# Patient Record
Sex: FEMALE | Employment: UNEMPLOYED | URBAN - METROPOLITAN AREA
[De-identification: names, ages, dates, MRNs, and addresses within clinical notes are randomized per-mention and may not be internally consistent; named-entity substitution may affect disease eponyms.]

---

## 2017-01-06 ENCOUNTER — OFFICE VISIT (OUTPATIENT)
Dept: URGENT CARE | Facility: URGENT CARE | Age: 52
End: 2017-01-06
Payer: COMMERCIAL

## 2017-01-06 VITALS
OXYGEN SATURATION: 100 % | DIASTOLIC BLOOD PRESSURE: 71 MMHG | HEART RATE: 88 BPM | BODY MASS INDEX: 30.26 KG/M2 | TEMPERATURE: 97.8 F | WEIGHT: 187.4 LBS | SYSTOLIC BLOOD PRESSURE: 125 MMHG

## 2017-01-06 DIAGNOSIS — J01.00 ACUTE MAXILLARY SINUSITIS, RECURRENCE NOT SPECIFIED: Primary | ICD-10-CM

## 2017-01-06 PROCEDURE — 99213 OFFICE O/P EST LOW 20 MIN: CPT | Performed by: INTERNAL MEDICINE

## 2017-01-06 NOTE — MR AVS SNAPSHOT
"              After Visit Summary   2017    Saritha Pearson    MRN: [id number]           Patient Information     Date Of Birth          1965        Visit Information        Provider Department      2017 8:50 PM Ernestina Contreras MD [facility]        Today's Diagnoses     Acute maxillary sinusitis, recurrence not specified    -  1        Follow-ups after your visit        Follow-up notes from your care team     Return if symptoms worsen or fail to improve.      Who to contact     If you have questions or need follow up information about today's clinic visit or your schedule please contact [facility] directly at [phone].  Normal or non-critical lab and imaging results will be communicated to you by MyChart, letter or phone within 4 business days after the clinic has received the results. If you do not hear from us within 7 days, please contact the clinic through MyChart or phone. If you have a critical or abnormal lab result, we will notify you by phone as soon as possible.  Submit refill requests through [employer] or call your pharmacy and they will forward the refill request to us. Please allow 3 business days for your refill to be completed.          Additional Information About Your Visit        MyChart Information     [employer] lets you send messages to your doctor, view your test results, renew your prescriptions, schedule appointments and more. To sign up, go to [email or web address] . Click on \"Log in\" on the left side of the screen, which will take you to the Welcome page. Then click on \"Sign up Now\" on the right side of the page.     You will be asked to enter the access code listed below, as well as some personal information. Please follow the directions to create your username and password.     Your access code is: [email or web address]  Expires: 3/8/2017  7:51 PM     Your access code will  in 90 days. If you need help or a new code, please call your [facility] " or [phone].        Care EveryWhere ID     This is your Care EveryWhere ID. This could be used by other organizations to access your [place] medical records  [id number]        Your Vitals Were     Pulse Temperature Pulse Oximetry             88 97.8  F (36.6  C) (Oral) 100%          Blood Pressure from Last 3 Encounters:   01/06/17 125/71   12/08/16 132/84   08/01/16 130/85    Weight from Last 3 Encounters:   01/06/17 187 lb 6.4 oz (85.004 kg)   12/08/16 192 lb 6.4 oz (87.272 kg)   08/01/16 170 lb (77.111 kg)              Today, you had the following     No orders found for display         Today's Medication Changes          These changes are accurate as of: 1/6/17  9:30 PM.  If you have any questions, ask your nurse or doctor.               Start taking these medicines.        Dose/Directions    amoxicillin-clavulanate 875-125 MG per tablet   Commonly known as:  AUGMENTIN   Used for:  Acute maxillary sinusitis, recurrence not specified   Started by:  Ernestina Contreras MD        Dose:  1 tablet   Take 1 tablet by mouth 2 times daily   Quantity:  20 tablet   Refills:  0            Where to get your medicines      These medications were sent to [facility] [address] [place] [id number]    Hours:  24-hours Phone:  [phone]    - amoxicillin-clavulanate 875-125 MG per tablet             Primary Care Provider Office Phone # Fax #    [facility] [phone] [address] [facility] [address]        Thank you!     Thank you for choosing [facility]  for your care. Our goal is always to provide you with excellent care. Hearing back from our patients is one way we can continue to improve our services. Please take a few minutes to complete the written survey that you may receive in the mail after your visit with us. Thank you!             Your Updated Medication  List - Protect others around you: Learn how to safely use, store and throw away your medicines at www.disposemymeds.org.          This list is accurate as of: 1/6/17  9:30 PM.  Always use your most recent med list.                   Brand Name Dispense Instructions for use    albuterol 108 (90 BASE) MCG/ACT Inhaler    PROAIR HFA/PROVENTIL HFA/VENTOLIN HFA    1 Inhaler    Inhale 2 puffs into the lungs every 4 hours as needed for shortness of breath / dyspnea or wheezing       amoxicillin-clavulanate 875-125 MG per tablet    AUGMENTIN    20 tablet    Take 1 tablet by mouth 2 times daily       bisacodyl 10 MG Suppository    DULCOLAX    25 suppository    Place 1 suppository (10 mg) rectally daily as needed for constipation       hydrocortisone 25 MG Suppository    ANUSOL-HC    28 suppository    Place 1 suppository (25 mg) rectally 2 times daily       MIRALAX powder   Generic drug:  polyethylene glycol     510 g    Take 17 g by mouth daily       phentermine 37.5 MG capsule     30 capsule    Take 1 capsule (37.5 mg) by mouth every morning

## 2017-01-07 NOTE — PROGRESS NOTES
SUBJECTIVE:  Saritha Pearson is an 51 year old female who presents for not feeling well.  Feels like left side of her face hurts and is congestion, like a sinus infection.  Had a sinus infection back in May which went away with augmentin.  Has felt warm but not checked temp.  Has taken ibuprofen and an OTC cold medicine which haven't helped.  sxs started about 2.5 weeks ago, and hasn't improved and getting a little worse. No recent travel.  No known exposures, but around people in general.  Some cough and runny nose.  No n/v/d.  No new skin rashes.  Some ear discomfort.        has a past medical history of PONV (postoperative nausea and vomiting); Gastro-oesophageal reflux disease; Asthma; and Constipation.  ALLERGIES:  Sulfa drugs    Current Outpatient Prescriptions   Medication     albuterol (PROAIR HFA, PROVENTIL HFA, VENTOLIN HFA) 108 (90 BASE) MCG/ACT inhaler     phentermine 37.5 MG capsule     polyethylene glycol (MIRALAX) powder     bisacodyl (DULCOLAX) 10 MG suppository     hydrocortisone (ANUSOL-HC) 25 MG suppository     No current facility-administered medications for this visit.         ROS:  ROS is done and is negative for general/constitutional, eye, ENT, Respiratory, cardiovascular, GI, , Skin, musculoskeletal except as noted elsewhere.      OBJECTIVE:  /71 mmHg  Pulse 88  Temp(Src) 97.8  F (36.6  C) (Oral)  Wt 187 lb 6.4 oz (85.004 kg)  SpO2 100%  GENERAL APPEARANCE: Alert, in no acute distress  EYES: normal  EARS: External ears normal. Canals clear. TM's normal.  NOSE: mildly inflamed mucosa  OROPHARYNX:normal  SINUSES: tender over maxillary sinuses, left worse than right  NECK:No adenopathy,masses or thyromegaly  RESP: normal and clear to auscultation  CV:regular rate and rhythm and no murmurs, clicks, or gallops  ABDOMEN: Abdomen soft, non-tender. BS normal. No masses, organomegaly  SKIN: dry appearing skin around eyes  MUSCULOSKELETAL:Musculoskeletal normal      RECENT LAB  RESULTS  Results for orders placed or performed during the hospital encounter of 08/01/16   Leg, right, venous US    Narrative    RIGHT LOWER EXTREMITY VENOUS DUPLEX ULTRASOUND  8/1/2016  8:06 PM     HISTORY:  Pain and swelling after recent surgery.    COMPARISON: None.    FINDINGS: Venous Doppler of the right lower extremity with waveform  spectral analysis. Exam is normal. No evidence for deep venous  thrombosis.      Impression    IMPRESSION: No evidence for deep venous thrombosis in the right lower  extremity.    ZAIDA LANGE MD   US Upper Extremity Venous Duplex Left    Narrative    LEFT UPPER EXTREMITY VENOUS DUPLEX ULTRASOUND  8/1/2016 8:06 PM     HISTORY: Pain. Recent IV. Rule out DVT.    TECHNIQUE: Venous Doppler US of the upper extremity.? Color flow and  spectral Doppler with waveform analysis performed.    COMPARISON: None.    FINDINGS: Ultrasound of the left upper extremity demonstrates no deep  vein thrombus in the subclavian, axillary or brachial veins. No  thrombus seen in the cephalic or basilic veins or visualized portions  of internal jugular vein.          Impression    IMPRESSION: No DVT identified left upper extremity.        GREGORY ALMARAZ MD   .    ASSESSMENT/PLAN:    ASSESSMENT / PLAN:  (J01.00) Acute maxillary sinusitis, recurrence not specified  (primary encounter diagnosis)  Comment: sxs for over 2 weeks and worsening, so will tx.  augmentin has worked well for her in past, so will rx now.  Plan: amoxicillin-clavulanate (AUGMENTIN) 875-125 MG         per tablet        Reviewed medication instructions and side effects. Follow up if experiences side effects.. I reviewed supportive care, expected course, and signs of concern.  Follow up prn or if she does not improve or if worsens in any way.      See [facility] for orders, medications, letters, patient instructions    Ernestina Contreras M.D.

## 2017-01-07 NOTE — NURSING NOTE
"Chief Complaint   Patient presents with     Sinus Problem     congestion and sinus pressure and ear pain x 2.5 weeks        Initial /71 mmHg  Pulse 88  Temp(Src) 97.8  F (36.6  C) (Oral)  Wt 187 lb 6.4 oz (85.004 kg)  SpO2 100% Estimated body mass index is 30.26 kg/(m^2) as calculated from the following:    Height as of 8/1/16: 5' 6\" (1.676 m).    Weight as of this encounter: 187 lb 6.4 oz (85.004 kg).  BP completed using cuff size: jori CAIN CMA ([facility])  9:01 PM 1/6/2017      "

## 2017-03-03 ENCOUNTER — OFFICE VISIT (OUTPATIENT)
Dept: FAMILY MEDICINE | Facility: CLINIC | Age: 52
End: 2017-03-03
Payer: COMMERCIAL

## 2017-03-03 VITALS
HEART RATE: 75 BPM | TEMPERATURE: 97.7 F | OXYGEN SATURATION: 95 % | WEIGHT: 188 LBS | BODY MASS INDEX: 30.22 KG/M2 | HEIGHT: 66 IN | SYSTOLIC BLOOD PRESSURE: 130 MMHG | DIASTOLIC BLOOD PRESSURE: 78 MMHG

## 2017-03-03 DIAGNOSIS — Z01.818 PREOP GENERAL PHYSICAL EXAM: Primary | ICD-10-CM

## 2017-03-03 PROCEDURE — 99214 OFFICE O/P EST MOD 30 MIN: CPT | Performed by: FAMILY MEDICINE

## 2017-03-03 NOTE — MR AVS SNAPSHOT
After Visit Summary   3/3/2017    Saritha Pearson    MRN: [id number]           Patient Information     Date Of Birth          1965        Visit Information        Provider Department      3/3/2017 9:30 AM Lalo Gold MD [facility]        Today's Diagnoses     Preop general physical exam    -  1      Care Instructions      Before Your Surgery      Call your surgeon if there is any change in your health. This includes signs of a cold or flu (such as a sore throat, runny nose, cough, rash or fever).    Do not smoke, drink alcohol or take over the counter medicine (unless your surgeon or primary care doctor tells you to) for the 24 hours before and after surgery.    If you take prescribed drugs: Follow your doctor s orders about which medicines to take and which to stop until after surgery.    Eating and drinking prior to surgery: follow the instructions from your surgeon    Take a shower or bath the night before surgery. Use the soap your surgeon gave you to gently clean your skin. If you do not have soap from your surgeon, use your regular soap. Do not shave or scrub the surgery site.  Wear clean pajamas and have clean sheets on your bed.         Follow-ups after your visit        Who to contact     If you have questions or need follow up information about today's clinic visit or your schedule please contact [facility] directly at [phone].  Normal or non-critical lab and imaging results will be communicated to you by MyChart, letter or phone within 4 business days after the clinic has received the results. If you do not hear from us within 7 days, please contact the clinic through MyChart or phone. If you have a critical or abnormal lab result, we will notify you by phone as soon as possible.  Submit refill requests through [employer] or call your pharmacy and they will forward the refill request to us. Please allow 3 business days for your refill to be completed.     "      Additional Information About Your Visit        MyChart Information     [employer] lets you send messages to your doctor, view your test results, renew your prescriptions, schedule appointments and more. To sign up, go to [email or web address] . Click on \"Log in\" on the left side of the screen, which will take you to the Welcome page. Then click on \"Sign up Now\" on the right side of the page.     You will be asked to enter the access code listed below, as well as some personal information. Please follow the directions to create your username and password.     Your access code is: [email or web address]  Expires: 3/8/2017  7:51 PM     Your access code will  in 90 days. If you need help or a new code, please call your [facility] clinic or [phone].        Care EveryWhere ID     This is your [facility] EveryWhere ID. This could be used by other organizations to access your [facility] medical records  [id number]        Your Vitals Were     Pulse Temperature Height Pulse Oximetry BMI (Body Mass Index)       75 97.7  F (36.5  C) (Oral) 5' 6\" (1.676 m) 95% 30.34 kg/m2        Blood Pressure from Last 3 Encounters:   17 130/78   17 125/71   16 132/84    Weight from Last 3 Encounters:   17 188 lb (85.3 kg)   17 187 lb 6.4 oz (85 kg)   16 192 lb 6.4 oz (87.3 kg)              Today, you had the following     No orders found for display         Today's Medication Changes          These changes are accurate as of: 3/3/17 10:01 AM.  If you have any questions, ask your nurse or doctor.               Stop taking these medicines if you haven't already. Please contact your care team if you have questions.     amoxicillin-clavulanate 875-125 MG per tablet   Commonly known as:  AUGMENTIN   Stopped by:  Lalo Gold MD           phentermine 37.5 MG capsule   Stopped by:  Lalo Gold MD                    Primary Care Provider Office Phone # Fax #    [facility] [phone] " [phone]       [address] [facility] [address]        Thank you!     Thank you for choosing [facility]  for your care. Our goal is always to provide you with excellent care. Hearing back from our patients is one way we can continue to improve our services. Please take a few minutes to complete the written survey that you may receive in the mail after your visit with us. Thank you!             Your Updated Medication List - Protect others around you: Learn how to safely use, store and throw away your medicines at www.disposemymeds.org.          This list is accurate as of: 3/3/17 10:01 AM.  Always use your most recent med list.                   Brand Name Dispense Instructions for use    albuterol 108 (90 BASE) MCG/ACT Inhaler    PROAIR HFA/PROVENTIL HFA/VENTOLIN HFA    1 Inhaler    Inhale 2 puffs into the lungs every 4 hours as needed for shortness of breath / dyspnea or wheezing       bisacodyl 10 MG Suppository    DULCOLAX    25 suppository    Place 1 suppository (10 mg) rectally daily as needed for constipation       hydrocortisone 25 MG Suppository    ANUSOL-HC    28 suppository    Place 1 suppository (25 mg) rectally 2 times daily       MIRALAX powder   Generic drug:  polyethylene glycol     510 g    Take 17 g by mouth daily

## 2017-03-03 NOTE — NURSING NOTE
"Chief Complaint   Patient presents with     Pre-Op Exam       Initial /78  Pulse 75  Temp 97.7  F (36.5  C) (Oral)  Ht 5' 6\" (1.676 m)  Wt 188 lb (85.3 kg)  SpO2 95%  BMI 30.34 kg/m2 Estimated body mass index is 30.34 kg/(m^2) as calculated from the following:    Height as of this encounter: 5' 6\" (1.676 m).    Weight as of this encounter: 188 lb (85.3 kg).  Medication Reconciliation: complete    Sandi Walter MA    "

## 2017-03-03 NOTE — PROGRESS NOTES
[facility]  [address] [facility] [address]  [phone]  Dept: [phone]    PRE-OP EVALUATION:  Today's date: 3/3/2017    Saritha Pearson (: 1965) presents for pre-operative evaluation assessment as requested by Dr. Vargas.  She requires evaluation and anesthesia risk assessment prior to undergoing surgery/procedure for treatment of fixing of an excision.  Proposed procedure: Lipo Transfer to soft [place]/ scar revision abdomen and excision temple skin    Date of Surgery/ Procedure: 11:15a  Time of Surgery/ Procedure: 2017  Hospital/Surgical Facility: [facility]   Fax number for surgical facility: [phone]  [facility] Physician: [facility]  Type of Anesthesia Anticipated: General    Patient has a Health Care Directive or Living Will:  NO    1. NO - Do you have a history of heart attack, stroke, stent, bypass or surgery on an artery in the head, neck, heart or legs?  2. NO - Do you ever have any pain or discomfort in your chest?  3. NO - Do you have a history of  Heart Failure?  4. NO - Are you troubled by shortness of breath when: walking on the level, up a slight hill or at night?  5. NO - Do you currently have a cold, bronchitis or other respiratory infection?  6. NO - Do you have a cough, shortness of breath or wheezing?  7. NO - Do you sometimes get pains in the calves of your legs when you walk?  8. NO - Do you or anyone in your family have previous history of blood clots?  9. NO - Do you or does anyone in your family have a serious bleeding problem such as prolonged bleeding following surgeries or cuts?  10. NO - Have you ever had problems with anemia or been told to take iron pills?  11. NO - Have you had any abnormal blood loss such as black, tarry or bloody stools, or abnormal vaginal bleeding?  12. NO - Have you ever had a blood transfusion?  13. NO - Have you or any of your relatives ever had problems with anesthesia?  14. NO - Do you  have sleep apnea, excessive snoring or daytime drowsiness?  15. NO - Do you have any prosthetic heart valves?  16. NO - Do you have prosthetic joints?  17. NO - Is there any chance that you may be pregnant?      HPI:                                                      Proposed surgery: Lipo Transfer to soft [place]/ scar revision abdomen and excision temple skin    See problem list for active medical problems.  Problems all longstanding and stable, except as noted/documented.  See ROS for pertinent symptoms related to these conditions.                                                                                                  .    MEDICAL HISTORY:                                                      Patient Active Problem List    Diagnosis Date Noted     Other postprocedural status(V45.89) 07/21/2016     Priority: Medium     Effusion of right knee 07/21/2016     Priority: Medium     Right knee pain 04/28/2016     Priority: Medium     Overweight 12/07/2015     Priority: Medium     Constipation 05/18/2015     Priority: Medium      Past Medical History   Diagnosis Date     Asthma      asthma sx after resp infections     Constipation      Gastro-oesophageal reflux disease      PONV (postoperative nausea and vomiting)      Past Surgical History   Procedure Laterality Date     Abdominoplasty       pt has post op infection     Abdomen surgery       abdominal wall mass     Cosmetic surgery       Hysterectomy       Excise lesion trunk  10/4/2011     Procedure:EXCISE LESION TRUNK; Excision Abdominal Wall Mass ; Surgeon:CARLITO BETTS; Location:[facility] OR     Gyn surgery       Hysterectomy, pap no longer indicated       Current Outpatient Prescriptions   Medication Sig Dispense Refill     albuterol (PROAIR HFA, PROVENTIL HFA, VENTOLIN HFA) 108 (90 BASE) MCG/ACT inhaler Inhale 2 puffs into the lungs every 4 hours as needed for shortness of breath / dyspnea or wheezing 1 Inhaler 1     polyethylene glycol (MIRALAX)  "powder Take 17 g by mouth daily 510 g 1     bisacodyl (DULCOLAX) 10 MG suppository Place 1 suppository (10 mg) rectally daily as needed for constipation 25 suppository 1     hydrocortisone (ANUSOL-HC) 25 MG suppository Place 1 suppository (25 mg) rectally 2 times daily 28 suppository 1     OTC products: None, except as noted above    Allergies   Allergen Reactions     Sulfa Drugs Hives, Itching and Rash      Latex Allergy: NO    Social History   Substance Use Topics     Smoking status: Never Smoker     Smokeless tobacco: Never Used     Alcohol use Yes      Comment: occasionally     History   Drug Use No       REVIEW OF SYSTEMS:                                                    C: NEGATIVE for fever, chills, change in weight  E/M: NEGATIVE for ear, mouth and throat problems  R: NEGATIVE for significant cough or SOB  CV: NEGATIVE for chest pain, palpitations or peripheral edema    EXAM:                                                    /78  Pulse 75  Temp 97.7  F (36.5  C) (Oral)  Ht 5' 6\" (1.676 m)  Wt 188 lb (85.3 kg)  SpO2 95%  BMI 30.34 kg/m2  GENERAL APPEARANCE: healthy, alert and no distress  HENT: ear canals and TM's normal and nose and mouth without ulcers or lesions  RESP: lungs clear to auscultation - no rales, rhonchi or wheezes  CV: regular rate and rhythm, normal S1 S2, no S3 or S4 and no murmur, click or rub   ABDOMEN: soft, nontender, no HSM or masses and bowel sounds normal  NEURO: Normal strength and tone, sensory exam grossly normal, mentation intact and speech normal    DIAGNOSTICS:                                                    No labs or EKG required for low risk surgery (cataract, skin procedure, breast biopsy, etc)    Recent Labs   Lab Test  04/26/16   1157  10/04/11   0655   HGB  12.0  11.7   PLT  281  305   NA  141   --    POTASSIUM  3.8   --    CR  0.71   --         IMPRESSION:                                                    Reason for surgery/procedure: per HPI    The proposed " surgical procedure is considered LOW risk.    REVISED CARDIAC RISK INDEX  The patient has the following serious cardiovascular risks for perioperative complications such as (MI, PE, VFib and 3  AV Block):  No serious cardiac risks  INTERPRETATION: 0 risks: Class I (very low risk - 0.4% complication rate)    The patient has the following additional risks for perioperative complications:  No identified additional risks      ICD-10-CM    1. Preop general physical exam Z01.818        RECOMMENDATIONS:                                                          APPROVAL GIVEN to proceed with proposed procedure, without further diagnostic evaluation       Signed Electronically by: BENNETT POLLARD MD    Copy of this evaluation report is provided to requesting physician.    [facility] Preop Guidelines

## 2017-03-06 ENCOUNTER — DOCUMENTATION ONLY (OUTPATIENT)
Dept: LAB | Facility: CLINIC | Age: 52
End: 2017-03-06

## 2017-03-06 DIAGNOSIS — Z01.818 PREOP GENERAL PHYSICAL EXAM: Primary | ICD-10-CM

## 2017-03-07 ENCOUNTER — TELEPHONE (OUTPATIENT)
Dept: FAMILY MEDICINE | Facility: CLINIC | Age: 52
End: 2017-03-07

## 2017-03-07 DIAGNOSIS — Z01.818 PREOP GENERAL PHYSICAL EXAM: ICD-10-CM

## 2017-03-07 LAB — HGB BLD-MCNC: 11.9 G/DL (ref 11.7–15.7)

## 2017-03-07 PROCEDURE — 36415 COLL VENOUS BLD VENIPUNCTURE: CPT | Performed by: FAMILY MEDICINE

## 2017-03-07 PROCEDURE — 85018 HEMOGLOBIN: CPT | Performed by: FAMILY MEDICINE

## 2017-03-07 NOTE — TELEPHONE ENCOUNTER
Reason for Call:  Other Pre-op form    Detailed comments: Esther from [facility] Dr. Grant office is calling looking form the pre-op physical form for patient. Please fax forms to [phone].     Phone Number Patient can be reached at: Other phone number:  [phone]    Best Time: anytime    Can we leave a detailed message on this number? YES    Call taken on 3/7/2017 at 4:04 PM by Daniel Dent

## 2017-03-07 NOTE — PROGRESS NOTES
Need previsit labs-see orders and close encounter if nothing else needed./Rosario Agrawal,       Lab apt 3/7/17

## 2017-03-07 NOTE — PROGRESS NOTES
I did her preop on 3/3/17 and determined that she did not need labs.    Please call her and find out if the surgeon wanted labs. If so which ones?    Lalo Gold M.D.

## 2017-03-07 NOTE — PROGRESS NOTES
.Your patient was in for pre-op lab  test today she thinks it a hgb  and there was no orders in Epic. results need to fax to [phone]  I drew JIC tubes.   Please tag any orders to the lab appointment or enter orders as a future and I will watch for them.  Thank you   Janett   @ [facility]

## 2017-03-07 NOTE — PROGRESS NOTES
Patient has an up coming lab appointment on 3.7.2017 for PRE-OP labs. Please review and place future orders that may be needed.     Thank you  Trini Castañeda MLT ([facility])

## 2017-03-08 ENCOUNTER — TELEPHONE (OUTPATIENT)
Dept: FAMILY MEDICINE | Facility: CLINIC | Age: 52
End: 2017-03-08

## 2017-03-08 NOTE — TELEPHONE ENCOUNTER
Patient is calling stating [facility] has not received her lab work done 03/07/17 and are threatening to cancel her surgery. Please FAX:  [phone] Tel: [phone], please call patient once it has been faxed so she can call them not to cancel her surgery. Thank you.

## 2017-03-08 NOTE — TELEPHONE ENCOUNTER
Patient called to say she gave wrong fax# in original message below.  Correct fax# is [phone].  They got the preop, but not the labs.

## 2017-03-24 ENCOUNTER — OFFICE VISIT (OUTPATIENT)
Dept: URGENT CARE | Facility: URGENT CARE | Age: 52
End: 2017-03-24
Payer: COMMERCIAL

## 2017-03-24 VITALS
BODY MASS INDEX: 29.99 KG/M2 | TEMPERATURE: 99.6 F | DIASTOLIC BLOOD PRESSURE: 79 MMHG | SYSTOLIC BLOOD PRESSURE: 136 MMHG | WEIGHT: 185.8 LBS | HEART RATE: 93 BPM

## 2017-03-24 DIAGNOSIS — J01.90 ACUTE SINUSITIS WITH SYMPTOMS > 10 DAYS: Primary | ICD-10-CM

## 2017-03-24 DIAGNOSIS — J20.9 ACUTE BRONCHITIS, UNSPECIFIED ORGANISM: ICD-10-CM

## 2017-03-24 PROCEDURE — 99214 OFFICE O/P EST MOD 30 MIN: CPT | Performed by: NURSE PRACTITIONER

## 2017-03-24 RX ORDER — AZITHROMYCIN 250 MG/1
TABLET, FILM COATED ORAL
Qty: 6 TABLET | Refills: 0 | Status: SHIPPED | OUTPATIENT
Start: 2017-03-24 | End: 2017-08-08

## 2017-03-24 RX ORDER — ALBUTEROL SULFATE 90 UG/1
2 AEROSOL, METERED RESPIRATORY (INHALATION) EVERY 4 HOURS PRN
Qty: 1 INHALER | Refills: 1 | Status: SHIPPED | OUTPATIENT
Start: 2017-03-24 | End: 2024-07-12

## 2017-03-24 NOTE — MR AVS SNAPSHOT
After Visit Summary   3/24/2017    Saritha Pearson    MRN: [id number]           Patient Information     Date Of Birth          1965        Visit Information        Provider Department      3/24/2017 8:35 PM Amelia Lara APRN CNP [facility]        Today's Diagnoses     Acute sinusitis with symptoms > 10 days    -  1      Care Instructions      Acute Bacterial Rhinosinusitis (ABRS)  Acute bacterial rhinosinusitis (ABRS) is an infection of your nasal cavity and sinuses. It s caused by bacteria. Acute means that you ve had symptoms for less than 12 weeks.  Understanding your sinuses  The nasal cavity is the large air-filled space behind your nose. The sinuses are a group of spaces formed by the bones of your face. They connect with your nasal cavity. ABRS causes the tissue lining these spaces to become inflamed. Mucus may not drain normally. This leads to facial pain and other symptoms.  What causes ABRS?  ABRS most often follows an upper respiratory infection caused by a virus. Bacteria then infect the lining of your nasal cavity and sinuses. But you can also get ABRS if you have:    Nasal allergies    Long-term nasal swelling and congestion not caused by allergies    Blockage in the nose  Symptoms of ABRS  The symptoms of ABRS may be different for each person, and can include:    Nasal congestion    Runny nose    Fluid draining from the nose down the throat (postnasal drip)    Headache    Cough    Pain in the sinuses    Thick, colored fluid from the nose (mucus)    Fever  Diagnosing ABRS  ABRS may be diagnosed if you ve had an upper respiratory infection like a cold and cough for longer than 10 to 14 days. Your health care provider will ask about your symptoms and your medical history. The provider will check your vital signs, including your temperature. You ll have a physical exam. The health care provider will check your ears, nose, and throat. You likely won t need any tests.  If ABRS comes back, you may have a culture or other tests.  Treatment for ABRS  Treatment may include:    Antibiotic medicine. This is for symptoms that last for at least 10 to 14 days.    Nasal corticosteroid medicine. Drops or spray used in the nose can lessen swelling and congestion.    Over-the-counter pain medicine. This is to lessen sinus pain and pressure.    Nasal decongestant medicine. Spray or drops may help to lessen congestion. Do not use them for more than a few days.    Salt wash (saline irrigation). This can help to loosen mucus.  Possible complications of ABRS  ABRS may come back or become long-term (chronic).  In rare cases, ABRS may cause complications such as:     Inflamed tissue around the brain and spinal cord (meningitis)    Inflamed tissue around the eyes (orbital cellulitis)    Inflamed bones around the sinuses (osteitis)  These problems may need to be treated in a hospital with intravenous (IV) antibiotic medicine or surgery.  When to call the health care provider  Call your health care provider if you have any of the following:    Symptoms that don t get better, or get worse    Symptoms that don t get better after 3 to 5 days on antibiotics    Trouble seeing    Swelling around your eyes    Confusion or trouble staying awake        [id number] [employer]. [address] [place] [address], [place] [address]. All rights reserved. This information is not intended as a substitute for professional medical care. Always follow your healthcare professional's instructions.              Follow-ups after your visit        Who to contact     If you have questions or need follow up information about today's clinic visit or your schedule please contact [facility] directly at [phone].  Normal or non-critical lab and imaging results will be communicated to you by MyChart, letter or phone within 4 business days after the clinic has received the results. If you do not hear from us within  "7 days, please contact the clinic through [employer] or phone. If you have a critical or abnormal lab result, we will notify you by phone as soon as possible.  Submit refill requests through [employer] or call your pharmacy and they will forward the refill request to us. Please allow 3 business days for your refill to be completed.          Additional Information About Your Visit        [employer] Information     [employer] lets you send messages to your doctor, view your test results, renew your prescriptions, schedule appointments and more. To sign up, go to [email or web address] . Click on \"Log in\" on the left side of the screen, which will take you to the Welcome page. Then click on \"Sign up Now\" on the right side of the page.     You will be asked to enter the access code listed below, as well as some personal information. Please follow the directions to create your username and password.     Your access code is: [email or web address]  Expires: 2017  9:01 PM     Your access code will  in 90 days. If you need help or a new code, please call your [place] clinic or [phone].        Care EveryWhere ID     This is your Care EveryWhere ID. This could be used by other organizations to access your [place] medical records  [id number]        Your Vitals Were     Pulse Temperature BMI (Body Mass Index)             93 99.6  F (37.6  C) (Tympanic) 29.99 kg/m2          Blood Pressure from Last 3 Encounters:   17 136/79   17 130/78   17 125/71    Weight from Last 3 Encounters:   17 185 lb 12.8 oz (84.3 kg)   17 188 lb (85.3 kg)   17 187 lb 6.4 oz (85 kg)              Today, you had the following     No orders found for display         Today's Medication Changes          These changes are accurate as of: 3/24/17  9:01 PM.  If you have any questions, ask your nurse or doctor.               Start taking these medicines.        Dose/Directions    azithromycin 250 MG tablet   Commonly known as:  " ZITHROMAX   Used for:  Acute sinusitis with symptoms > 10 days   Started by:  Amelia Lara APRN CNP        Two tablets first day, then one tablet daily for four days.   Quantity:  6 tablet   Refills:  0            Where to get your medicines      These medications were sent to [facility] [id number] [place] [address] AT [address] [address]    Hours:  24-hours Phone:  [phone]     azithromycin 250 MG tablet                Primary Care Provider Office Phone # Fax #    [facility] [phone] [address] [facility] [address]        Thank you!     Thank you for choosing [facility]  for your care. Our goal is always to provide you with excellent care. Hearing back from our patients is one way we can continue to improve our services. Please take a few minutes to complete the written survey that you may receive in the mail after your visit with us. Thank you!             Your Updated Medication List - Protect others around you: Learn how to safely use, store and throw away your medicines at www.disposemymeds.org.          This list is accurate as of: 3/24/17  9:01 PM.  Always use your most recent med list.                   Brand Name Dispense Instructions for use    albuterol 108 (90 BASE) MCG/ACT Inhaler    PROAIR HFA/PROVENTIL HFA/VENTOLIN HFA    1 Inhaler    Inhale 2 puffs into the lungs every 4 hours as needed for shortness of breath / dyspnea or wheezing       azithromycin 250 MG tablet    ZITHROMAX    6 tablet    Two tablets first day, then one tablet daily for four days.       bisacodyl 10 MG Suppository    DULCOLAX    25 suppository    Place 10 mg rectally daily as needed for constipation Reported on 3/24/2017       hydrocortisone 25 MG Suppository    ANUSOL-HC    28 suppository    Place 1 suppository (25 mg) rectally 2 times daily       MIRALAX powder   Generic drug:  polyethylene glycol     510  g    Take 1 capful by mouth daily Reported on 3/24/2017

## 2017-03-25 NOTE — NURSING NOTE
"Chief Complaint   Patient presents with     Sinus Problem     cough, headache, chest hurts when cough       Initial /79  Pulse 93  Temp 99.6  F (37.6  C) (Tympanic)  Wt 185 lb 12.8 oz (84.3 kg)  BMI 29.99 kg/m2 Estimated body mass index is 29.99 kg/(m^2) as calculated from the following:    Height as of 3/3/17: 5' 6\" (1.676 m).    Weight as of this encounter: 185 lb 12.8 oz (84.3 kg).  Medication Reconciliation: complete   Sierra Gamboa CMA      "

## 2017-03-25 NOTE — PROGRESS NOTES
SUBJECTIVE:                                                    Saritha Pearson is a 51 year old female who presents to clinic today for the following health issues:      RESPIRATORY SYMPTOMS      Duration: X 3 weeks    Description  nasal congestion, facial pain/pressure, cough, fever, headache and fatigue/malaise    Severity: moderate    Accompanying signs and symptoms: None    History (predisposing factors):  none    Precipitating or alleviating factors: None    Therapies tried and outcome:  Mucinex, Sinus Allergy Medication, Dyselm Syrup       Problem list and histories reviewed & adjusted, as indicated.  Additional history: as documented    Patient Active Problem List   Diagnosis     Constipation     Overweight     Right knee pain     Other postprocedural status(V45.89)     Effusion of right knee     Past Surgical History:   Procedure Laterality Date     ABDOMEN SURGERY      abdominal wall mass     ABDOMINOPLASTY      pt has post op infection     COSMETIC SURGERY       EXCISE LESION TRUNK  10/4/2011    Procedure:EXCISE LESION TRUNK; Excision Abdominal Wall Mass ; Surgeon:CARLITO BETTS; Location:[facility] OR     GYN SURGERY       HYSTERECTOMY       HYSTERECTOMY, PAP NO LONGER INDICATED         Social History   Substance Use Topics     Smoking status: Never Smoker     Smokeless tobacco: Never Used     Alcohol use Yes      Comment: occasionally     Family History   Problem Relation Age of Onset     CEREBROVASCULAR DISEASE Father 60     CEREBROVASCULAR DISEASE Maternal Grandmother      Cancer - colorectal Maternal Grandfather 50     CANCER Paternal Grandmother      CANCER Paternal Grandfather      Cancer - colorectal Sister 40     Breast Cancer Paternal Aunt      Breast Cancer Paternal Aunt      Breast Cancer Paternal Aunt      Breast Cancer Paternal Aunt      Breast Cancer Paternal Aunt            ROS:  Constitutional, HEENT, cardiovascular, pulmonary, GI, , musculoskeletal, neuro, skin, endocrine and psych  systems are negative, except as otherwise noted.    OBJECTIVE:                                                    /79  Pulse 93  Temp 99.6  F (37.6  C) (Tympanic)  Wt 185 lb 12.8 oz (84.3 kg)  BMI 29.99 kg/m2  Body mass index is 29.99 kg/(m^2).  GENERAL: healthy, alert and no distress  EYES: Eyes grossly normal to inspection, PERRL and conjunctivae and sclerae normal  HENT: normal cephalic/atraumatic, ear canals and TM's normal, nasal mucosa edematous , rhinorrhea yellow, thick and purulent, oropharynx clear, oral mucous membranes moist and sinuses: frontal tenderness on bilateral  NECK: no adenopathy, no asymmetry, masses, or scars and thyroid normal to palpation  RESP: lungs clear to auscultation - no rales, rhonchi or wheezes  CV: regular rate and rhythm, normal S1 S2, no S3 or S4, no murmur, click or rub, no peripheral edema and peripheral pulses strong    Diagnostic Test Results:  none      ASSESSMENT/PLAN:                                                        1. Acute sinusitis with symptoms > 10 days    - azithromycin (ZITHROMAX) 250 MG tablet; Two tablets first day, then one tablet daily for four days.  Dispense: 6 tablet; Refill: 0    2. Acute bronchitis, unspecified organism    - albuterol (PROAIR HFA/PROVENTIL HFA/VENTOLIN HFA) 108 (90 BASE) MCG/ACT Inhaler; Inhale 2 puffs into the lungs every 4 hours as needed for shortness of breath / dyspnea or wheezing  Dispense: 1 Inhaler; Refill: 1    See Patient Instructions  Patient Instructions       Acute Bacterial Rhinosinusitis (ABRS)  Acute bacterial rhinosinusitis (ABRS) is an infection of your nasal cavity and sinuses. It s caused by bacteria. Acute means that you ve had symptoms for less than 12 weeks.  Understanding your sinuses  The nasal cavity is the large air-filled space behind your nose. The sinuses are a group of spaces formed by the bones of your face. They connect with your nasal cavity. ABRS causes the tissue lining these spaces to  become inflamed. Mucus may not drain normally. This leads to facial pain and other symptoms.  What causes ABRS?  ABRS most often follows an upper respiratory infection caused by a virus. Bacteria then infect the lining of your nasal cavity and sinuses. But you can also get ABRS if you have:    Nasal allergies    Long-term nasal swelling and congestion not caused by allergies    Blockage in the nose  Symptoms of ABRS  The symptoms of ABRS may be different for each person, and can include:    Nasal congestion    Runny nose    Fluid draining from the nose down the throat (postnasal drip)    Headache    Cough    Pain in the sinuses    Thick, colored fluid from the nose (mucus)    Fever  Diagnosing ABRS  ABRS may be diagnosed if you ve had an upper respiratory infection like a cold and cough for longer than 10 to 14 days. Your health care provider will ask about your symptoms and your medical history. The provider will check your vital signs, including your temperature. You ll have a physical exam. The health care provider will check your ears, nose, and throat. You likely won t need any tests. If ABRS comes back, you may have a culture or other tests.  Treatment for ABRS  Treatment may include:    Antibiotic medicine. This is for symptoms that last for at least 10 to 14 days.    Nasal corticosteroid medicine. Drops or spray used in the nose can lessen swelling and congestion.    Over-the-counter pain medicine. This is to lessen sinus pain and pressure.    Nasal decongestant medicine. Spray or drops may help to lessen congestion. Do not use them for more than a few days.    Salt wash (saline irrigation). This can help to loosen mucus.  Possible complications of ABRS  ABRS may come back or become long-term (chronic).  In rare cases, ABRS may cause complications such as:     Inflamed tissue around the brain and spinal cord (meningitis)    Inflamed tissue around the eyes (orbital cellulitis)    Inflamed bones around the  sinuses (osteitis)  These problems may need to be treated in a hospital with intravenous (IV) antibiotic medicine or surgery.  When to call the health care provider  Call your health care provider if you have any of the following:    Symptoms that don t get better, or get worse    Symptoms that don t get better after 3 to 5 days on antibiotics    Trouble seeing    Swelling around your eyes    Confusion or trouble staying awake        [id number] [employer]. [address] [place] [address], [place] [address]. All rights reserved. This information is not intended as a substitute for professional medical care. Always follow your healthcare professional's instructions.            ALEX Valadez [facility] ([facility])[3]

## 2017-03-25 NOTE — PATIENT INSTRUCTIONS
Acute Bacterial Rhinosinusitis (ABRS)  Acute bacterial rhinosinusitis (ABRS) is an infection of your nasal cavity and sinuses. It s caused by bacteria. Acute means that you ve had symptoms for less than 12 weeks.  Understanding your sinuses  The nasal cavity is the large air-filled space behind your nose. The sinuses are a group of spaces formed by the bones of your face. They connect with your nasal cavity. ABRS causes the tissue lining these spaces to become inflamed. Mucus may not drain normally. This leads to facial pain and other symptoms.  What causes ABRS?  ABRS most often follows an upper respiratory infection caused by a virus. Bacteria then infect the lining of your nasal cavity and sinuses. But you can also get ABRS if you have:    Nasal allergies    Long-term nasal swelling and congestion not caused by allergies    Blockage in the nose  Symptoms of ABRS  The symptoms of ABRS may be different for each person, and can include:    Nasal congestion    Runny nose    Fluid draining from the nose down the throat (postnasal drip)    Headache    Cough    Pain in the sinuses    Thick, colored fluid from the nose (mucus)    Fever  Diagnosing ABRS  ABRS may be diagnosed if you ve had an upper respiratory infection like a cold and cough for longer than 10 to 14 days. Your health care provider will ask about your symptoms and your medical history. The provider will check your vital signs, including your temperature. You ll have a physical exam. The health care provider will check your ears, nose, and throat. You likely won t need any tests. If ABRS comes back, you may have a culture or other tests.  Treatment for ABRS  Treatment may include:    Antibiotic medicine. This is for symptoms that last for at least 10 to 14 days.    Nasal corticosteroid medicine. Drops or spray used in the nose can lessen swelling and congestion.    Over-the-counter pain medicine. This is to lessen sinus pain and pressure.    Nasal  decongestant medicine. Spray or drops may help to lessen congestion. Do not use them for more than a few days.    Salt wash (saline irrigation). This can help to loosen mucus.  Possible complications of ABRS  ABRS may come back or become long-term (chronic).  In rare cases, ABRS may cause complications such as:     Inflamed tissue around the brain and spinal cord (meningitis)    Inflamed tissue around the eyes (orbital cellulitis)    Inflamed bones around the sinuses (osteitis)  These problems may need to be treated in a hospital with intravenous (IV) antibiotic medicine or surgery.  When to call the health care provider  Call your health care provider if you have any of the following:    Symptoms that don t get better, or get worse    Symptoms that don t get better after 3 to 5 days on antibiotics    Trouble seeing    Swelling around your eyes    Confusion or trouble staying awake        [id number] [employer]. [address] [place] [address]. All rights reserved. This information is not intended as a substitute for professional medical care. Always follow your healthcare professional's instructions.

## 2017-05-03 ENCOUNTER — OFFICE VISIT (OUTPATIENT)
Dept: URGENT CARE | Facility: URGENT CARE | Age: 52
End: 2017-05-03
Payer: COMMERCIAL

## 2017-05-03 VITALS
SYSTOLIC BLOOD PRESSURE: 119 MMHG | HEART RATE: 74 BPM | BODY MASS INDEX: 30.05 KG/M2 | WEIGHT: 186.2 LBS | DIASTOLIC BLOOD PRESSURE: 75 MMHG | TEMPERATURE: 97.9 F

## 2017-05-03 DIAGNOSIS — J01.90 ACUTE SINUSITIS WITH SYMPTOMS > 10 DAYS: Primary | ICD-10-CM

## 2017-05-03 DIAGNOSIS — H65.02 ACUTE SEROUS OTITIS MEDIA OF LEFT EAR, RECURRENCE NOT SPECIFIED: ICD-10-CM

## 2017-05-03 PROCEDURE — 99213 OFFICE O/P EST LOW 20 MIN: CPT | Performed by: FAMILY MEDICINE

## 2017-05-03 RX ORDER — FLUTICASONE PROPIONATE 50 MCG
1-2 SPRAY, SUSPENSION (ML) NASAL DAILY
Qty: 1 BOTTLE | Refills: 11 | Status: SHIPPED | OUTPATIENT
Start: 2017-05-03 | End: 2018-07-08

## 2017-05-03 NOTE — MR AVS SNAPSHOT
After Visit Summary   5/3/2017    Saritha Pearson    MRN: [id number]           Patient Information     Date Of Birth          1965        Visit Information        Provider Department      5/3/2017 8:15 PM Jose Lombardi MD [facility]        Today's Diagnoses     Acute sinusitis with symptoms > 10 days    -  1      Care Instructions      Sinusitis (Antibiotic Treatment)    The sinuses are air-filled spaces within the bones of the face. They connect to the inside of the nose. Sinusitis is an inflammation of the tissue lining the sinus cavity. Sinus inflammation can occur during a cold. It can also be due to allergies to pollens and other particles in the air. Sinusitis can cause symptoms of sinus congestion and fullness. A sinus infection causes fever, headache and facial pain. There is often green or yellow drainage from the nose or into the back of the throat (post-nasal drip). You have been given antibiotics to treat this condition.  Home care:    Take the full course of antibiotics as instructed. Do not stop taking them, even if you feel better.    Drink plenty of water, hot tea, and other liquids. This may help thin mucus. It also may promote sinus drainage.    Heat may help soothe painful areas of the face. Use a towel soaked in hot water. Or,  the shower and direct the hot spray onto your face. Using a vaporizer along with a menthol rub at night may also help.     An expectorant containing guaifenesin may help thin the mucus and promote drainage from the sinuses.    Over-the-counter decongestants may be used unless a similar medicine was prescribed. Nasal sprays work the fastest. Use one that contains phenylephrine or oxymetazoline. First blow the nose gently. Then use the spray. Do not use these medicines more often than directed on the label or symptoms may get worse. You may also use tablets containing pseudoephedrine. Avoid products that combine ingredients, because  side effects may be increased. Read labels. You can also ask the pharmacist for help. (NOTE: Persons with high blood pressure should not use decongestants. They can raise blood pressure.)    Over-the-counter antihistamines may help if allergies contributed to your sinusitis.      Do not use nasal rinses or irrigation during an acute sinus infection, unless told to by your health care provider. Rinsing may spread the infection to other sinuses.    Use acetaminophen or ibuprofen to control pain, unless another pain medicine was prescribed. (If you have chronic liver or kidney disease or ever had a stomach ulcer, talk with your doctor before using these medicines. Aspirin should never be used in anyone under 18 years of age who is ill with a fever. It may cause severe liver damage.)    Don't smoke. This can worsen symptoms.  Follow-up care  Follow up with your healthcare provider or our staff if you are not improving within the next week.  When to seek medical advice  Call your healthcare provider if any of these occur:    Facial pain or headache becoming more severe    Stiff neck    Unusual drowsiness or confusion    Swelling of the forehead or eyelids    Vision problems, including blurred or double vision    Fever of 100.4 F (38 C) or higher, or as directed by your healthcare provider    Seizure    Breathing problems    Symptoms not resolving within 10 days    0550-9246 [employer]. [address] [place] [address], [place] [id number]. All rights reserved. This information is not intended as a substitute for professional medical care. Always follow your healthcare professional's instructions.        Patient Education    Amoxicillin Trihydrate, Clavulanate Potassium Chewable tablet    Amoxicillin Trihydrate, Clavulanate Potassium Oral suspension    Amoxicillin Trihydrate, Clavulanate Potassium Oral tablet    Amoxicillin Trihydrate, Clavulanate Potassium Oral tablet, extended-release  Amoxicillin Trihydrate,  Clavulanate Potassium Oral tablet  What is this medicine?  AMOXICILLIN; CLAVULANIC ACID (a mox i GRACIELA in; KARENA mares ic AS id) is a penicillin antibiotic. It is used to treat certain kinds of bacterial infections. It will not work for colds, flu, or other viral infections.  This medicine may be used for other purposes; ask your health care provider or pharmacist if you have questions.  What should I tell my health care provider before I take this medicine?  They need to know if you have any of these conditions:    bowel disease, like colitis    kidney disease    liver disease    mononucleosis    an unusual or allergic reaction to amoxicillin, penicillin, cephalosporin, other antibiotics, clavulanic acid, other medicines, foods, dyes, or preservatives    pregnant or trying to get pregnant    breast-feeding  How should I use this medicine?  Take this medicine by mouth with a full glass of water. Follow the directions on the prescription label. Take at the start of a meal. Do not crush or chew. If the tablet has a score line, you may cut it in half at the score line for easier swallowing. Take your medicine at regular intervals. Do not take your medicine more often than directed. Take all of your medicine as directed even if you think you are better. Do not skip doses or stop your medicine early.  Talk to your pediatrician regarding the use of this medicine in children. Special care may be needed.  Overdosage: If you think you have taken too much of this medicine contact a poison control center or emergency room at once.  NOTE: This medicine is only for you. Do not share this medicine with others.  What if I miss a dose?  If you miss a dose, take it as soon as you can. If it is almost time for your next dose, take only that dose. Do not take double or extra doses.  What may interact with this medicine?    allopurinol    anticoagulants    birth control pills    methotrexate    probenecid  This list may not describe all  possible interactions. Give your health care provider a list of all the medicines, herbs, non-prescription drugs, or dietary supplements you use. Also tell them if you smoke, drink alcohol, or use illegal drugs. Some items may interact with your medicine.  What should I watch for while using this medicine?  Tell your doctor or health care professional if your symptoms do not improve.  Do not treat diarrhea with over the counter products. Contact your doctor if you have diarrhea that lasts more than 2 days or if it is severe and watery.  If you have diabetes, you may get a false-positive result for sugar in your urine. Check with your doctor or health care professional.  Birth control pills may not work properly while you are taking this medicine. Talk to your doctor about using an extra method of birth control.  What side effects may I notice from receiving this medicine?  Side effects that you should report to your doctor or health care professional as soon as possible:    allergic reactions like skin rash, itching or hives, swelling of the face, lips, or tongue    breathing problems    dark urine    fever or chills, sore throat    redness, blistering, peeling or loosening of the skin, including inside the mouth    seizures    trouble passing urine or change in the amount of urine    unusual bleeding, bruising    unusually weak or tired    white patches or sores in the mouth or throat  Side effects that usually do not require medical attention (report to your doctor or health care professional if they continue or are bothersome):    diarrhea    dizziness    headache    nausea, vomiting    stomach upset    vaginal or anal irritation  This list may not describe all possible side effects. Call your doctor for medical advice about side effects. You may report side effects to FDA at 1-914-VVE-1518.  Where should I keep my medicine?  Keep out of the reach of children.  Store at room temperature below 25 degrees C (77  degrees F). Keep container tightly closed. Throw away any unused medicine after the expiration date.  NOTE:This sheet is a summary. It may not cover all possible information. If you have questions about this medicine, talk to your doctor, pharmacist, or health care provider. Copyright  2016 [employer] Standard        Patient Education    Fluticasone Furoate Inhalation powder    Fluticasone Furoate Nasal spray, suspension    Fluticasone Propionate Inhalation powder    Fluticasone Propionate Nasal spray, suspension    Fluticasone Propionate Pressurized inhalation, suspension    Fluticasone Propionate Topical cream    Fluticasone Propionate Topical lotion    Fluticasone Propionate Topical ointment  Fluticasone Furoate Nasal spray, suspension  What is this medicine?  FLUTICASONE (floo TIK a sone) is a corticosteroid. This medicine is used to treat the symptoms of allergies like sneezing, itchy red eyes, and itchy, runny, or stuffy nose.  This medicine may be used for other purposes; ask your health care provider or pharmacist if you have questions.  What should I tell my health care provider before I take this medicine?  They need to know if you have any of these conditions:    infection, like tuberculosis, herpes, or fungal infection    recent surgery on nose or sinuses    taking corticosteroid by mouth    an unusual or allergic reaction to fluticasone, steroids, other medicines, foods, dyes, or preservatives    pregnant or trying to get pregnant    breast-feeding  How should I use this medicine?  This medicine is for use in the nose. Follow the directions on your product or prescription label. This medicine works best if used at regular intervals. Do not use more often than directed. Make sure that you are using your nasal spray correctly. After 6 months of daily use without a prescription, talk to your doctor or health care professional before using it for a longer time. Ask your doctor or health care professional if you  have any questions.  Talk to your pediatrician regarding the use of this medicine in children. Special care may be needed. This medicine has been used in children as young as 2 years. After two months of daily use without a prescription in a child, talk to your pediatrician before using it for a longer time.  Overdosage: If you think you have taken too much of this medicine contact a poison control center or emergency room at once.  NOTE: This medicine is only for you. Do not share this medicine with others.  What if I miss a dose?  If you miss a dose, use it as soon as you remember. If it is almost time for your next dose, use only that dose and continue with your regular schedule. Do not use double or extra doses.  What may interact with this medicine?    ketoconazole    metyrapone    some medicines for HIV    vaccines  This list may not describe all possible interactions. Give your health care provider a list of all the medicines, herbs, non-prescription drugs, or dietary supplements you use. Also tell them if you smoke, drink alcohol, or use illegal drugs. Some items may interact with your medicine.  What should I watch for while using this medicine?  Visit your doctor or health care professional for regular checks on your progress. Some symptoms may improve within 12 hours after starting use. Check with your doctor or health care professional if there is no improvement in your condition after 3 weeks of use.  Do not come in contact with people who have chickenpox or the measles while you are taking this medicine. If you do, call your doctor right away.  What side effects may I notice from receiving this medicine?  Side effects that you should report to your doctor or health care professional as soon as possible:    allergic reactions like skin rash, itching or hives, swelling of the face, lips, or tongue    changes in vision    flu-like symptoms    white patches or sores in the mouth or nose  Side effects that  usually do not require medical attention (report to your doctor or health care professional if they continue or are bothersome):    burning or irritation inside the nose or throat    cough    headache    nosebleed    unusual taste or smell  This list may not describe all possible side effects. Call your doctor for medical advice about side effects. You may report side effects to FDA at 4-613-CTI-5996.  Where should I keep my medicine?  Keep out of the reach of children.  Store at room temperature between 15 and 30 degrees C (59 and 86 degrees F). Throw away any unused medicine after the expiration date.  NOTE: This sheet is a summary. It may not cover all possible information. If you have questions about this medicine, talk to your doctor, pharmacist, or health care provider.  NOTE:This sheet is a summary. It may not cover all possible information. If you have questions about this medicine, talk to your doctor, pharmacist, or health care provider. Copyright  2016 [employer] Standard              Follow-ups after your visit        Who to contact     If you have questions or need follow up information about today's clinic visit or your schedule please contact [facility] directly at [phone].  Normal or non-critical lab and imaging results will be communicated to you by Z[employer]art, letter or phone within 4 business days after the clinic has received the results. If you do not hear from us within 7 days, please contact the clinic through [employer] or phone. If you have a critical or abnormal lab result, we will notify you by phone as soon as possible.  Submit refill requests through [email or web address] or call your pharmacy and they will forward the refill request to us. Please allow 3 business days for your refill to be completed.          Additional Information About Your Visit        [email or web address] Information     [email or web address] lets you send messages to your doctor, view your test results, renew your prescriptions, schedule  "appointments and more. To sign up, go to [email or web address] . Click on \"Log in\" on the left side of the screen, which will take you to the Welcome page. Then click on \"Sign up Now\" on the right side of the page.     You will be asked to enter the access code listed below, as well as some personal information. Please follow the directions to create your username and password.     Your access code is: [email or web address]  Expires: 2017  9:01 PM     Your access code will  in 90 days. If you need help or a new code, please call your [facility] clinic or [phone].        Care EveryWhere ID     This is your Care EveryWhere ID. This could be used by other organizations to access your [facility] medical records  [id number]        Your Vitals Were     Pulse Temperature BMI (Body Mass Index)             74 97.9  F (36.6  C) (Oral) 30.05 kg/m2          Blood Pressure from Last 3 Encounters:   17 119/75   17 136/79   17 130/78    Weight from Last 3 Encounters:   17 186 lb 3.2 oz (84.5 kg)   17 185 lb 12.8 oz (84.3 kg)   17 188 lb (85.3 kg)              Today, you had the following     No orders found for display         Today's Medication Changes          These changes are accurate as of: 5/3/17  8:45 PM.  If you have any questions, ask your nurse or doctor.               Start taking these medicines.        Dose/Directions    amoxicillin-clavulanate 875-125 MG per tablet   Commonly known as:  AUGMENTIN   Used for:  Acute sinusitis with symptoms > 10 days        Dose:  1 tablet   Take 1 tablet by mouth 2 times daily   Quantity:  20 tablet   Refills:  0       fluticasone 50 MCG/ACT spray   Commonly known as:  FLONASE   Used for:  Acute sinusitis with symptoms > 10 days        Dose:  1-2 spray   Spray 1-2 sprays into both nostrils daily   Quantity:  1 Bottle   Refills:  11            Where to get your medicines      These medications were sent to [facility] [id number] [place] [address], " [place] - [address] AT [address] [place] [address]  [address] [address]    Hours:  24-hours Phone:  [phone]     amoxicillin-clavulanate 875-125 MG per tablet    fluticasone 50 MCG/ACT spray                Primary Care Provider Office Phone # Fax #    [facility] [phone] [address] [facility] [address]        Thank you!     Thank you for choosing [facility]  for your care. Our goal is always to provide you with excellent care. Hearing back from our patients is one way we can continue to improve our services. Please take a few minutes to complete the written survey that you may receive in the mail after your visit with us. Thank you!             Your Updated Medication List - Protect others around you: Learn how to safely use, store and throw away your medicines at www.disposemymeds.org.          This list is accurate as of: 5/3/17  8:45 PM.  Always use your most recent med list.                   Brand Name Dispense Instructions for use    albuterol 108 (90 BASE) MCG/ACT Inhaler    PROAIR HFA/PROVENTIL HFA/VENTOLIN HFA    1 Inhaler    Inhale 2 puffs into the lungs every 4 hours as needed for shortness of breath / dyspnea or wheezing       amoxicillin-clavulanate 875-125 MG per tablet    AUGMENTIN    20 tablet    Take 1 tablet by mouth 2 times daily       azithromycin 250 MG tablet    ZITHROMAX    6 tablet    Two tablets first day, then one tablet daily for four days.       bisacodyl 10 MG Suppository    DULCOLAX    25 suppository    Place 10 mg rectally daily as needed for constipation Reported on 3/24/2017       fluticasone 50 MCG/ACT spray    FLONASE    1 Bottle    Spray 1-2 sprays into both nostrils daily       hydrocortisone 25 MG Suppository    ANUSOL-HC    28 suppository    Place 1 suppository (25 mg) rectally 2 times daily       MIRALAX powder   Generic drug:  polyethylene glycol     510 g    Take 1 capful by mouth daily Reported on  3/24/2017

## 2017-05-04 NOTE — PROGRESS NOTES
SUBJECTIVE:                                                    Saritha Pearson is a 51 year old female who presents to clinic today for the following health issues:      RESPIRATORY SYMPTOMS      Duration: X 3 weeks, has gotten worse X 1 week    Description  nasal congestion, rhinorrhea, sore throat, facial pain/pressure, cough, ear pain left, fatigue/malaise and hoarse voice    Severity: moderate    Accompanying signs and symptoms: None    History (predisposing factors):  none    Precipitating or alleviating factors: None    Therapies tried and outcome:  Ibuprofen, Mucinex    Denies smoking cigarette        Problem list and histories reviewed & adjusted, as indicated.  Additional history: as documented    Patient Active Problem List   Diagnosis     Constipation     Overweight     Right knee pain     Other postprocedural status(V45.89)     Effusion of right knee     Past Surgical History:   Procedure Laterality Date     ABDOMEN SURGERY      abdominal wall mass     ABDOMINOPLASTY      pt has post op infection     COSMETIC SURGERY       EXCISE LESION TRUNK  10/4/2011    Procedure:EXCISE LESION TRUNK; Excision Abdominal Wall Mass ; Surgeon:CARLITO BETTS; Location:[facility]     GYN SURGERY       HYSTERECTOMY       HYSTERECTOMY, PAP NO LONGER INDICATED         Social History   Substance Use Topics     Smoking status: Never Smoker     Smokeless tobacco: Never Used     Alcohol use Yes      Comment: occasionally     Family History   Problem Relation Age of Onset     CEREBROVASCULAR DISEASE Father 60     CEREBROVASCULAR DISEASE Maternal Grandmother      Cancer - colorectal Maternal Grandfather 50     CANCER Paternal Grandmother      CANCER Paternal Grandfather      Cancer - colorectal Sister 40     Breast Cancer Paternal Aunt      Breast Cancer Paternal Aunt      Breast Cancer Paternal Aunt      Breast Cancer Paternal Aunt      Breast Cancer Paternal Aunt          Current Outpatient Prescriptions   Medication Sig  Dispense Refill     amoxicillin-clavulanate (AUGMENTIN) 875-125 MG per tablet Take 1 tablet by mouth 2 times daily 20 tablet 0     fluticasone (FLONASE) 50 MCG/ACT spray Spray 1-2 sprays into both nostrils daily 1 Bottle 11     azithromycin (ZITHROMAX) 250 MG tablet Two tablets first day, then one tablet daily for four days. 6 tablet 0     albuterol (PROAIR HFA/PROVENTIL HFA/VENTOLIN HFA) 108 (90 BASE) MCG/ACT Inhaler Inhale 2 puffs into the lungs every 4 hours as needed for shortness of breath / dyspnea or wheezing 1 Inhaler 1     polyethylene glycol (MIRALAX) powder Take 1 capful by mouth daily Reported on 3/24/2017 510 g 1     bisacodyl (DULCOLAX) 10 MG suppository Place 10 mg rectally daily as needed for constipation Reported on 3/24/2017 25 suppository 1     hydrocortisone (ANUSOL-HC) 25 MG suppository Place 1 suppository (25 mg) rectally 2 times daily (Patient not taking: Reported on 5/3/2017) 28 suppository 1     Allergies   Allergen Reactions     Sulfa Drugs Hives, Itching and Rash     Recent Labs   Lab Test  04/26/16   1157   LDL  66   HDL  64   TRIG  85   CR  0.71   GFRESTIMATED  88   GFRESTBLACK  >90   GFR Calc     POTASSIUM  3.8      BP Readings from Last 3 Encounters:   05/03/17 119/75   03/24/17 136/79   03/03/17 130/78    Wt Readings from Last 3 Encounters:   05/03/17 186 lb 3.2 oz (84.5 kg)   03/24/17 185 lb 12.8 oz (84.3 kg)   03/03/17 188 lb (85.3 kg)                  Labs reviewed in EPIC    ROS:  Constitutional, HEENT, cardiovascular, pulmonary, gi and gu systems are negative, except as otherwise noted.    OBJECTIVE:                                                    /75  Pulse 74  Temp 97.9  F (36.6  C) (Oral)  Wt 186 lb 3.2 oz (84.5 kg)  BMI 30.05 kg/m2  Body mass index is 30.05 kg/(m^2).  GENERAL: healthy, alert and no distress  EYES: Eyes grossly normal to inspection, PERRL and conjunctivae and sclerae normal  HENT: normal cephalic/atraumatic, right ear: normal: no  effusions, no erythema, normal landmarks, left ear: clear effusion, nasal mucosa edematous , oral mucous membranes moist and sinuses: maxillary tenderness on left  NECK: left parotid lymphadenopathy   RESP: lungs clear to auscultation - no rales, rhonchi or wheezes  CV: regular rate and rhythm, normal S1 S2, no S3 or S4, no murmur, click or rub, no peripheral edema and peripheral pulses strong  ABDOMEN: soft, nontender, no hepatosplenomegaly, no masses and bowel sounds normal  MS: no gross musculoskeletal defects noted, no edema     ASSESSMENT/PLAN:                                                          ICD-10-CM    1. Acute sinusitis with symptoms > 10 days J01.90 amoxicillin-clavulanate (AUGMENTIN) 875-125 MG per tablet     fluticasone (FLONASE) 50 MCG/ACT spray   2. Acute serous otitis media of left ear, recurrence not specified H65.02        Discussed in detail differentials and further management. Symptoms are likely secondary to acute sinusitis and serous otitis media. Augmentin and flonase prescribed, common side effects discussed. Recommended well hydration, over-the-counter analgesia, warm fluids and saline gargles. Written instructions/information provided. Patient understood and in agreement with the above plan. All questions are answered. Follow-up if symptoms persist or worsen.      MEDICATIONS:   Orders Placed This Encounter   Medications     amoxicillin-clavulanate (AUGMENTIN) 875-125 MG per tablet     Sig: Take 1 tablet by mouth 2 times daily     Dispense:  20 tablet     Refill:  0     fluticasone (FLONASE) 50 MCG/ACT spray     Sig: Spray 1-2 sprays into both nostrils daily     Dispense:  1 Bottle     Refill:  11     Patient Instructions     Sinusitis (Antibiotic Treatment)    The sinuses are air-filled spaces within the bones of the face. They connect to the inside of the nose. Sinusitis is an inflammation of the tissue lining the sinus cavity. Sinus inflammation can occur during a cold. It can  also be due to allergies to pollens and other particles in the air. Sinusitis can cause symptoms of sinus congestion and fullness. A sinus infection causes fever, headache and facial pain. There is often green or yellow drainage from the nose or into the back of the throat (post-nasal drip). You have been given antibiotics to treat this condition.  Home care:    Take the full course of antibiotics as instructed. Do not stop taking them, even if you feel better.    Drink plenty of water, hot tea, and other liquids. This may help thin mucus. It also may promote sinus drainage.    Heat may help soothe painful areas of the face. Use a towel soaked in hot water. Or,  the shower and direct the hot spray onto your face. Using a vaporizer along with a menthol rub at night may also help.     An expectorant containing guaifenesin may help thin the mucus and promote drainage from the sinuses.    Over-the-counter decongestants may be used unless a similar medicine was prescribed. Nasal sprays work the fastest. Use one that contains phenylephrine or oxymetazoline. First blow the nose gently. Then use the spray. Do not use these medicines more often than directed on the label or symptoms may get worse. You may also use tablets containing pseudoephedrine. Avoid products that combine ingredients, because side effects may be increased. Read labels. You can also ask the pharmacist for help. (NOTE: Persons with high blood pressure should not use decongestants. They can raise blood pressure.)    Over-the-counter antihistamines may help if allergies contributed to your sinusitis.      Do not use nasal rinses or irrigation during an acute sinus infection, unless told to by your health care provider. Rinsing may spread the infection to other sinuses.    Use acetaminophen or ibuprofen to control pain, unless another pain medicine was prescribed. (If you have chronic liver or kidney disease or ever had a stomach ulcer, talk with  your doctor before using these medicines. Aspirin should never be used in anyone under 18 years of age who is ill with a fever. It may cause severe liver damage.)    Don't smoke. This can worsen symptoms.  Follow-up care  Follow up with your healthcare provider or our staff if you are not improving within the next week.  When to seek medical advice  Call your healthcare provider if any of these occur:    Facial pain or headache becoming more severe    Stiff neck    Unusual drowsiness or confusion    Swelling of the forehead or eyelids    Vision problems, including blurred or double vision    Fever of 100.4 F (38 C) or higher, or as directed by your healthcare provider    Seizure    Breathing problems    Symptoms not resolving within 10 days    [id number] [employer]. [address] [place] [address], [place] [address]. All rights reserved. This information is not intended as a substitute for professional medical care. Always follow your healthcare professional's instructions.        Patient Education    Amoxicillin Trihydrate, Clavulanate Potassium Chewable tablet    Amoxicillin Trihydrate, Clavulanate Potassium Oral suspension    Amoxicillin Trihydrate, Clavulanate Potassium Oral tablet    Amoxicillin Trihydrate, Clavulanate Potassium Oral tablet, extended-release  Amoxicillin Trihydrate, Clavulanate Potassium Oral tablet  What is this medicine?  AMOXICILLIN; CLAVULANIC ACID (a mox i GRACIELA in; KARENA burnette AS id) is a penicillin antibiotic. It is used to treat certain kinds of bacterial infections. It will not work for colds, flu, or other viral infections.  This medicine may be used for other purposes; ask your health care provider or pharmacist if you have questions.  What should I tell my health care provider before I take this medicine?  They need to know if you have any of these conditions:    bowel disease, like colitis    kidney disease    liver disease    mononucleosis    an unusual or allergic reaction  to amoxicillin, penicillin, cephalosporin, other antibiotics, clavulanic acid, other medicines, foods, dyes, or preservatives    pregnant or trying to get pregnant    breast-feeding  How should I use this medicine?  Take this medicine by mouth with a full glass of water. Follow the directions on the prescription label. Take at the start of a meal. Do not crush or chew. If the tablet has a score line, you may cut it in half at the score line for easier swallowing. Take your medicine at regular intervals. Do not take your medicine more often than directed. Take all of your medicine as directed even if you think you are better. Do not skip doses or stop your medicine early.  Talk to your pediatrician regarding the use of this medicine in children. Special care may be needed.  Overdosage: If you think you have taken too much of this medicine contact a poison control center or emergency room at once.  NOTE: This medicine is only for you. Do not share this medicine with others.  What if I miss a dose?  If you miss a dose, take it as soon as you can. If it is almost time for your next dose, take only that dose. Do not take double or extra doses.  What may interact with this medicine?    allopurinol    anticoagulants    birth control pills    methotrexate    probenecid  This list may not describe all possible interactions. Give your health care provider a list of all the medicines, herbs, non-prescription drugs, or dietary supplements you use. Also tell them if you smoke, drink alcohol, or use illegal drugs. Some items may interact with your medicine.  What should I watch for while using this medicine?  Tell your doctor or health care professional if your symptoms do not improve.  Do not treat diarrhea with over the counter products. Contact your doctor if you have diarrhea that lasts more than 2 days or if it is severe and watery.  If you have diabetes, you may get a false-positive result for sugar in your urine. Check  with your doctor or health care professional.  Birth control pills may not work properly while you are taking this medicine. Talk to your doctor about using an extra method of birth control.  What side effects may I notice from receiving this medicine?  Side effects that you should report to your doctor or health care professional as soon as possible:    allergic reactions like skin rash, itching or hives, swelling of the face, lips, or tongue    breathing problems    dark urine    fever or chills, sore throat    redness, blistering, peeling or loosening of the skin, including inside the mouth    seizures    trouble passing urine or change in the amount of urine    unusual bleeding, bruising    unusually weak or tired    white patches or sores in the mouth or throat  Side effects that usually do not require medical attention (report to your doctor or health care professional if they continue or are bothersome):    diarrhea    dizziness    headache    nausea, vomiting    stomach upset    vaginal or anal irritation  This list may not describe all possible side effects. Call your doctor for medical advice about side effects. You may report side effects to FDA at 8-249-FDA-2673.  Where should I keep my medicine?  Keep out of the reach of children.  Store at room temperature below 25 degrees C (77 degrees F). Keep container tightly closed. Throw away any unused medicine after the expiration date.  NOTE:This sheet is a summary. It may not cover all possible information. If you have questions about this medicine, talk to your doctor, pharmacist, or health care provider. Copyright  2016 [employer]        Patient Education    Fluticasone Furoate Inhalation powder    Fluticasone Furoate Nasal spray, suspension    Fluticasone Propionate Inhalation powder    Fluticasone Propionate Nasal spray, suspension    Fluticasone Propionate Pressurized inhalation, suspension    Fluticasone Propionate Topical cream    Fluticasone  Propionate Topical lotion    Fluticasone Propionate Topical ointment  Fluticasone Furoate Nasal spray, suspension  What is this medicine?  FLUTICASONE (floo TIK a sone) is a corticosteroid. This medicine is used to treat the symptoms of allergies like sneezing, itchy red eyes, and itchy, runny, or stuffy nose.  This medicine may be used for other purposes; ask your health care provider or pharmacist if you have questions.  What should I tell my health care provider before I take this medicine?  They need to know if you have any of these conditions:    infection, like tuberculosis, herpes, or fungal infection    recent surgery on nose or sinuses    taking corticosteroid by mouth    an unusual or allergic reaction to fluticasone, steroids, other medicines, foods, dyes, or preservatives    pregnant or trying to get pregnant    breast-feeding  How should I use this medicine?  This medicine is for use in the nose. Follow the directions on your product or prescription label. This medicine works best if used at regular intervals. Do not use more often than directed. Make sure that you are using your nasal spray correctly. After 6 months of daily use without a prescription, talk to your doctor or health care professional before using it for a longer time. Ask your doctor or health care professional if you have any questions.  Talk to your pediatrician regarding the use of this medicine in children. Special care may be needed. This medicine has been used in children as young as 2 years. After two months of daily use without a prescription in a child, talk to your pediatrician before using it for a longer time.  Overdosage: If you think you have taken too much of this medicine contact a poison control center or emergency room at once.  NOTE: This medicine is only for you. Do not share this medicine with others.  What if I miss a dose?  If you miss a dose, use it as soon as you remember. If it is almost time for your next  dose, use only that dose and continue with your regular schedule. Do not use double or extra doses.  What may interact with this medicine?    ketoconazole    metyrapone    some medicines for HIV    vaccines  This list may not describe all possible interactions. Give your health care provider a list of all the medicines, herbs, non-prescription drugs, or dietary supplements you use. Also tell them if you smoke, drink alcohol, or use illegal drugs. Some items may interact with your medicine.  What should I watch for while using this medicine?  Visit your doctor or health care professional for regular checks on your progress. Some symptoms may improve within 12 hours after starting use. Check with your doctor or health care professional if there is no improvement in your condition after 3 weeks of use.  Do not come in contact with people who have chickenpox or the measles while you are taking this medicine. If you do, call your doctor right away.  What side effects may I notice from receiving this medicine?  Side effects that you should report to your doctor or health care professional as soon as possible:    allergic reactions like skin rash, itching or hives, swelling of the face, lips, or tongue    changes in vision    flu-like symptoms    white patches or sores in the mouth or nose  Side effects that usually do not require medical attention (report to your doctor or health care professional if they continue or are bothersome):    burning or irritation inside the nose or throat    cough    headache    nosebleed    unusual taste or smell  This list may not describe all possible side effects. Call your doctor for medical advice about side effects. You may report side effects to FDA at 1-655-MEY-2407.  Where should I keep my medicine?  Keep out of the reach of children.  Store at room temperature between 15 and 30 degrees C (59 and 86 degrees F). Throw away any unused medicine after the expiration date.  NOTE: This  sheet is a summary. It may not cover all possible information. If you have questions about this medicine, talk to your doctor, pharmacist, or health care provider.  NOTE:This sheet is a summary. It may not cover all possible information. If you have questions about this medicine, talk to your doctor, pharmacist, or health care provider. Copyright  2016 [employer]            Jose Lombardi MD  [facility]

## 2017-05-04 NOTE — NURSING NOTE
"Chief Complaint   Patient presents with     Cough     sinus pain, pressure, left side - ear pain       Initial /75  Pulse 74  Temp 97.9  F (36.6  C) (Oral)  Wt 186 lb 3.2 oz (84.5 kg)  BMI 30.05 kg/m2 Estimated body mass index is 30.05 kg/(m^2) as calculated from the following:    Height as of 3/3/17: 5' 6\" (1.676 m).    Weight as of this encounter: 186 lb 3.2 oz (84.5 kg).  Medication Reconciliation: complete   Sierra Gamboa CMA      "

## 2017-05-04 NOTE — PATIENT INSTRUCTIONS
Sinusitis (Antibiotic Treatment)    The sinuses are air-filled spaces within the bones of the face. They connect to the inside of the nose. Sinusitis is an inflammation of the tissue lining the sinus cavity. Sinus inflammation can occur during a cold. It can also be due to allergies to pollens and other particles in the air. Sinusitis can cause symptoms of sinus congestion and fullness. A sinus infection causes fever, headache and facial pain. There is often green or yellow drainage from the nose or into the back of the throat (post-nasal drip). You have been given antibiotics to treat this condition.  Home care:    Take the full course of antibiotics as instructed. Do not stop taking them, even if you feel better.    Drink plenty of water, hot tea, and other liquids. This may help thin mucus. It also may promote sinus drainage.    Heat may help soothe painful areas of the face. Use a towel soaked in hot water. Or,  the shower and direct the hot spray onto your face. Using a vaporizer along with a menthol rub at night may also help.     An expectorant containing guaifenesin may help thin the mucus and promote drainage from the sinuses.    Over-the-counter decongestants may be used unless a similar medicine was prescribed. Nasal sprays work the fastest. Use one that contains phenylephrine or oxymetazoline. First blow the nose gently. Then use the spray. Do not use these medicines more often than directed on the label or symptoms may get worse. You may also use tablets containing pseudoephedrine. Avoid products that combine ingredients, because side effects may be increased. Read labels. You can also ask the pharmacist for help. (NOTE: Persons with high blood pressure should not use decongestants. They can raise blood pressure.)    Over-the-counter antihistamines may help if allergies contributed to your sinusitis.      Do not use nasal rinses or irrigation during an acute sinus infection, unless told to by  your health care provider. Rinsing may spread the infection to other sinuses.    Use acetaminophen or ibuprofen to control pain, unless another pain medicine was prescribed. (If you have chronic liver or kidney disease or ever had a stomach ulcer, talk with your doctor before using these medicines. Aspirin should never be used in anyone under 18 years of age who is ill with a fever. It may cause severe liver damage.)    Don't smoke. This can worsen symptoms.  Follow-up care  Follow up with your healthcare provider or our staff if you are not improving within the next week.  When to seek medical advice  Call your healthcare provider if any of these occur:    Facial pain or headache becoming more severe    Stiff neck    Unusual drowsiness or confusion    Swelling of the forehead or eyelids    Vision problems, including blurred or double vision    Fever of 100.4 F (38 C) or higher, or as directed by your healthcare provider    Seizure    Breathing problems    Symptoms not resolving within 10 days    [id number] [employer]. [address] [place] [address]. All rights reserved. This information is not intended as a substitute for professional medical care. Always follow your healthcare professional's instructions.        Patient Education    Amoxicillin Trihydrate, Clavulanate Potassium Chewable tablet    Amoxicillin Trihydrate, Clavulanate Potassium Oral suspension    Amoxicillin Trihydrate, Clavulanate Potassium Oral tablet    Amoxicillin Trihydrate, Clavulanate Potassium Oral tablet, extended-release  Amoxicillin Trihydrate, Clavulanate Potassium Oral tablet  What is this medicine?  AMOXICILLIN; CLAVULANIC ACID (a mox i GRACIELA in; KARENA burnette AS id) is a penicillin antibiotic. It is used to treat certain kinds of bacterial infections. It will not work for colds, flu, or other viral infections.  This medicine may be used for other purposes; ask your health care provider or pharmacist if you have  questions.  What should I tell my health care provider before I take this medicine?  They need to know if you have any of these conditions:    bowel disease, like colitis    kidney disease    liver disease    mononucleosis    an unusual or allergic reaction to amoxicillin, penicillin, cephalosporin, other antibiotics, clavulanic acid, other medicines, foods, dyes, or preservatives    pregnant or trying to get pregnant    breast-feeding  How should I use this medicine?  Take this medicine by mouth with a full glass of water. Follow the directions on the prescription label. Take at the start of a meal. Do not crush or chew. If the tablet has a score line, you may cut it in half at the score line for easier swallowing. Take your medicine at regular intervals. Do not take your medicine more often than directed. Take all of your medicine as directed even if you think you are better. Do not skip doses or stop your medicine early.  Talk to your pediatrician regarding the use of this medicine in children. Special care may be needed.  Overdosage: If you think you have taken too much of this medicine contact a poison control center or emergency room at once.  NOTE: This medicine is only for you. Do not share this medicine with others.  What if I miss a dose?  If you miss a dose, take it as soon as you can. If it is almost time for your next dose, take only that dose. Do not take double or extra doses.  What may interact with this medicine?    allopurinol    anticoagulants    birth control pills    methotrexate    probenecid  This list may not describe all possible interactions. Give your health care provider a list of all the medicines, herbs, non-prescription drugs, or dietary supplements you use. Also tell them if you smoke, drink alcohol, or use illegal drugs. Some items may interact with your medicine.  What should I watch for while using this medicine?  Tell your doctor or health care professional if your symptoms do not  improve.  Do not treat diarrhea with over the counter products. Contact your doctor if you have diarrhea that lasts more than 2 days or if it is severe and watery.  If you have diabetes, you may get a false-positive result for sugar in your urine. Check with your doctor or health care professional.  Birth control pills may not work properly while you are taking this medicine. Talk to your doctor about using an extra method of birth control.  What side effects may I notice from receiving this medicine?  Side effects that you should report to your doctor or health care professional as soon as possible:    allergic reactions like skin rash, itching or hives, swelling of the face, lips, or tongue    breathing problems    dark urine    fever or chills, sore throat    redness, blistering, peeling or loosening of the skin, including inside the mouth    seizures    trouble passing urine or change in the amount of urine    unusual bleeding, bruising    unusually weak or tired    white patches or sores in the mouth or throat  Side effects that usually do not require medical attention (report to your doctor or health care professional if they continue or are bothersome):    diarrhea    dizziness    headache    nausea, vomiting    stomach upset    vaginal or anal irritation  This list may not describe all possible side effects. Call your doctor for medical advice about side effects. You may report side effects to FDA at 5-529-FDA-4263.  Where should I keep my medicine?  Keep out of the reach of children.  Store at room temperature below 25 degrees C (77 degrees F). Keep container tightly closed. Throw away any unused medicine after the expiration date.  NOTE:This sheet is a summary. It may not cover all possible information. If you have questions about this medicine, talk to your doctor, pharmacist, or health care provider. Copyright  2016 [employer]        Patient Education    Fluticasone Furoate Inhalation  powder    Fluticasone Furoate Nasal spray, suspension    Fluticasone Propionate Inhalation powder    Fluticasone Propionate Nasal spray, suspension    Fluticasone Propionate Pressurized inhalation, suspension    Fluticasone Propionate Topical cream    Fluticasone Propionate Topical lotion    Fluticasone Propionate Topical ointment  Fluticasone Furoate Nasal spray, suspension  What is this medicine?  FLUTICASONE (floo TIK a sone) is a corticosteroid. This medicine is used to treat the symptoms of allergies like sneezing, itchy red eyes, and itchy, runny, or stuffy nose.  This medicine may be used for other purposes; ask your health care provider or pharmacist if you have questions.  What should I tell my health care provider before I take this medicine?  They need to know if you have any of these conditions:    infection, like tuberculosis, herpes, or fungal infection    recent surgery on nose or sinuses    taking corticosteroid by mouth    an unusual or allergic reaction to fluticasone, steroids, other medicines, foods, dyes, or preservatives    pregnant or trying to get pregnant    breast-feeding  How should I use this medicine?  This medicine is for use in the nose. Follow the directions on your product or prescription label. This medicine works best if used at regular intervals. Do not use more often than directed. Make sure that you are using your nasal spray correctly. After 6 months of daily use without a prescription, talk to your doctor or health care professional before using it for a longer time. Ask your doctor or health care professional if you have any questions.  Talk to your pediatrician regarding the use of this medicine in children. Special care may be needed. This medicine has been used in children as young as 2 years. After two months of daily use without a prescription in a child, talk to your pediatrician before using it for a longer time.  Overdosage: If you think you have taken too much of  this medicine contact a poison control center or emergency room at once.  NOTE: This medicine is only for you. Do not share this medicine with others.  What if I miss a dose?  If you miss a dose, use it as soon as you remember. If it is almost time for your next dose, use only that dose and continue with your regular schedule. Do not use double or extra doses.  What may interact with this medicine?    ketoconazole    metyrapone    some medicines for HIV    vaccines  This list may not describe all possible interactions. Give your health care provider a list of all the medicines, herbs, non-prescription drugs, or dietary supplements you use. Also tell them if you smoke, drink alcohol, or use illegal drugs. Some items may interact with your medicine.  What should I watch for while using this medicine?  Visit your doctor or health care professional for regular checks on your progress. Some symptoms may improve within 12 hours after starting use. Check with your doctor or health care professional if there is no improvement in your condition after 3 weeks of use.  Do not come in contact with people who have chickenpox or the measles while you are taking this medicine. If you do, call your doctor right away.  What side effects may I notice from receiving this medicine?  Side effects that you should report to your doctor or health care professional as soon as possible:    allergic reactions like skin rash, itching or hives, swelling of the face, lips, or tongue    changes in vision    flu-like symptoms    white patches or sores in the mouth or nose  Side effects that usually do not require medical attention (report to your doctor or health care professional if they continue or are bothersome):    burning or irritation inside the nose or throat    cough    headache    nosebleed    unusual taste or smell  This list may not describe all possible side effects. Call your doctor for medical advice about side effects. You may  report side effects to FDA at 3-239-FDA-6309.  Where should I keep my medicine?  Keep out of the reach of children.  Store at room temperature between 15 and 30 degrees C (59 and 86 degrees F). Throw away any unused medicine after the expiration date.  NOTE: This sheet is a summary. It may not cover all possible information. If you have questions about this medicine, talk to your doctor, pharmacist, or health care provider.  NOTE:This sheet is a summary. It may not cover all possible information. If you have questions about this medicine, talk to your doctor, pharmacist, or health care provider. Copyright  2016 [employer]

## 2017-05-05 ENCOUNTER — HOSPITAL ENCOUNTER (EMERGENCY)
Facility: CLINIC | Age: 52
Discharge: HOME | End: 2017-05-05
Attending: PHYSICIAN ASSISTANT | Admitting: PHYSICIAN ASSISTANT
Payer: COMMERCIAL

## 2017-05-05 ENCOUNTER — APPOINTMENT (OUTPATIENT)
Dept: CT IMAGING | Facility: CLINIC | Age: 52
End: 2017-05-05
Attending: PHYSICIAN ASSISTANT
Payer: COMMERCIAL

## 2017-05-05 VITALS
OXYGEN SATURATION: 99 % | RESPIRATION RATE: 18 BRPM | DIASTOLIC BLOOD PRESSURE: 95 MMHG | HEART RATE: 63 BPM | SYSTOLIC BLOOD PRESSURE: 123 MMHG | TEMPERATURE: 97.6 F | HEIGHT: 66 IN | BODY MASS INDEX: 27.32 KG/M2 | WEIGHT: 170 LBS

## 2017-05-05 DIAGNOSIS — R22.1 NECK NODULE: ICD-10-CM

## 2017-05-05 DIAGNOSIS — R68.84 JAW PAIN: ICD-10-CM

## 2017-05-05 LAB
ANION GAP SERPL CALCULATED.3IONS-SCNC: 4 MMOL/L (ref 3–14)
BASOPHILS # BLD AUTO: 0 10E9/L (ref 0–0.2)
BASOPHILS NFR BLD AUTO: 0.4 %
BUN SERPL-MCNC: 7 MG/DL (ref 7–30)
CALCIUM SERPL-MCNC: 9.2 MG/DL (ref 8.5–10.1)
CHLORIDE SERPL-SCNC: 107 MMOL/L (ref 94–109)
CO2 SERPL-SCNC: 28 MMOL/L (ref 20–32)
CREAT SERPL-MCNC: 0.7 MG/DL (ref 0.52–1.04)
DIFFERENTIAL METHOD BLD: NORMAL
EOSINOPHIL # BLD AUTO: 0.1 10E9/L (ref 0–0.7)
EOSINOPHIL NFR BLD AUTO: 1.3 %
ERYTHROCYTE [DISTWIDTH] IN BLOOD BY AUTOMATED COUNT: 13 % (ref 10–15)
GFR SERPL CREATININE-BSD FRML MDRD: 88 ML/MIN/1.7M2
GLUCOSE SERPL-MCNC: 87 MG/DL (ref 70–99)
HCT VFR BLD AUTO: 37.7 % (ref 35–47)
HGB BLD-MCNC: 12.5 G/DL (ref 11.7–15.7)
IMM GRANULOCYTES # BLD: 0 10E9/L (ref 0–0.4)
IMM GRANULOCYTES NFR BLD: 0 %
LYMPHOCYTES # BLD AUTO: 2.2 10E9/L (ref 0.8–5.3)
LYMPHOCYTES NFR BLD AUTO: 45.7 %
MCH RBC QN AUTO: 27.1 PG (ref 26.5–33)
MCHC RBC AUTO-ENTMCNC: 33.2 G/DL (ref 31.5–36.5)
MCV RBC AUTO: 82 FL (ref 78–100)
MONOCYTES # BLD AUTO: 0.3 10E9/L (ref 0–1.3)
MONOCYTES NFR BLD AUTO: 5.3 %
NEUTROPHILS # BLD AUTO: 2.3 10E9/L (ref 1.6–8.3)
NEUTROPHILS NFR BLD AUTO: 47.3 %
NRBC # BLD AUTO: 0 10*3/UL
NRBC BLD AUTO-RTO: 0 /100
PLATELET # BLD AUTO: 328 10E9/L (ref 150–450)
POTASSIUM SERPL-SCNC: 3.9 MMOL/L (ref 3.4–5.3)
RBC # BLD AUTO: 4.62 10E12/L (ref 3.8–5.2)
SODIUM SERPL-SCNC: 139 MMOL/L (ref 133–144)
WBC # BLD AUTO: 4.8 10E9/L (ref 4–11)

## 2017-05-05 PROCEDURE — 25500064 ZZH RX 255 OP 636: Performed by: PHYSICIAN ASSISTANT

## 2017-05-05 PROCEDURE — 85025 COMPLETE CBC W/AUTO DIFF WBC: CPT | Performed by: PHYSICIAN ASSISTANT

## 2017-05-05 PROCEDURE — 99285 EMERGENCY DEPT VISIT HI MDM: CPT | Mod: 25

## 2017-05-05 PROCEDURE — 70491 CT SOFT TISSUE NECK W/DYE: CPT

## 2017-05-05 PROCEDURE — 80048 BASIC METABOLIC PNL TOTAL CA: CPT | Performed by: PHYSICIAN ASSISTANT

## 2017-05-05 PROCEDURE — 25000125 ZZHC RX 250: Performed by: PHYSICIAN ASSISTANT

## 2017-05-05 RX ORDER — IOPAMIDOL 755 MG/ML
80 INJECTION, SOLUTION INTRAVASCULAR ONCE
Status: DISCONTINUED | OUTPATIENT
Start: 2017-05-05 | End: 2017-05-05

## 2017-05-05 RX ORDER — IOPAMIDOL 755 MG/ML
120 INJECTION, SOLUTION INTRAVASCULAR ONCE
Status: DISCONTINUED | OUTPATIENT
Start: 2017-05-05 | End: 2017-05-05

## 2017-05-05 RX ORDER — PREDNISONE 20 MG/1
TABLET ORAL
Qty: 10 TABLET | Refills: 0 | Status: SHIPPED | OUTPATIENT
Start: 2017-05-05 | End: 2017-08-08

## 2017-05-05 RX ORDER — IOPAMIDOL 755 MG/ML
60 INJECTION, SOLUTION INTRAVASCULAR ONCE
Status: COMPLETED | OUTPATIENT
Start: 2017-05-05 | End: 2017-05-05

## 2017-05-05 RX ADMIN — SODIUM CHLORIDE 100 ML: 9 INJECTION, SOLUTION INTRAVENOUS at 13:24

## 2017-05-05 RX ADMIN — IOPAMIDOL 60 ML: 755 INJECTION, SOLUTION INTRAVENOUS at 13:24

## 2017-05-05 ASSESSMENT — ENCOUNTER SYMPTOMS
VOMITING: 0
NAUSEA: 0
FACIAL SWELLING: 1
TROUBLE SWALLOWING: 0
HEADACHES: 0
FEVER: 0
SHORTNESS OF BREATH: 0
COUGH: 0
SORE THROAT: 0
ABDOMINAL PAIN: 0

## 2017-05-05 NOTE — DISCHARGE INSTRUCTIONS
Return to the Emergency Department if:    You develop a fever over 101 degrees Fahrenheit.    You can t open your mouth normally, can t move your tongue well, or can t swallow.    You have new or increased swelling of your face or neck.    You develop drainage of pus or foul smelling material from around your tooth.    Remember that you can always come back to the Emergency Department if you are not able to see your regular doctor in the amount of time listed above, if you get any new symptoms, or if there is anything that worries you.

## 2017-05-05 NOTE — ED AVS SNAPSHOT
Emergency Department    [address] [facility] [address]    Phone:  [phone]    Fax:  [phone]                                       Saritha Pearson   MRN: [id number]    Department:   Emergency Department   Date of Visit:  5/5/2017           Patient Information     Date Of Birth          1965        Your diagnoses for this visit were:     Jaw pain     Neck nodule        You were seen by Sierra Allen PA-C.      Follow-up Information     Follow up with Yulissa Taylor MD.    Specialty:  Otolaryngology    Contact information:    [facility]  [address] [address]  [phone]          Follow up with  Emergency Department.    Specialty:  EMERGENCY MEDICINE    Why:  If symptoms worsen    Contact information:    [address] [facility] [id number] [phone]        Discharge Instructions           Return to the Emergency Department if:    You develop a fever over 101 degrees Fahrenheit.    You can t open your mouth normally, can t move your tongue well, or can t swallow.    You have new or increased swelling of your face or neck.    You develop drainage of pus or foul smelling material from around your tooth.    Remember that you can always come back to the Emergency Department if you are not able to see your regular doctor in the amount of time listed above, if you get any new symptoms, or if there is anything that worries you.      Future Appointments        Provider Department Dept Phone Center    5/10/2017 8:10 AM BENNETT POLLADR MD [facility] [phone] [facility]      24 Hour Appointment Hotline       To make an appointment at any [facility], call [phone] ([phone]). If you don't have a family doctor or clinic, we will help you find one. [facility] are conveniently located to serve the needs of you and your family.             Review of your medicines      START taking         Dose / Directions Last dose taken    predniSONE 20 MG tablet   Commonly known as:  DELTASONE   Quantity:  10 tablet        Take two tablets (= 40mg) each day for 5 (five) days   Refills:  0          Our records show that you are taking the medicines listed below. If these are incorrect, please call your family doctor or clinic.        Dose / Directions Last dose taken    albuterol 108 (90 BASE) MCG/ACT Inhaler   Commonly known as:  PROAIR HFA/PROVENTIL HFA/VENTOLIN HFA   Dose:  2 puff   Quantity:  1 Inhaler        Inhale 2 puffs into the lungs every 4 hours as needed for shortness of breath / dyspnea or wheezing   Refills:  1        amoxicillin-clavulanate 875-125 MG per tablet   Commonly known as:  AUGMENTIN   Dose:  1 tablet   Quantity:  20 tablet        Take 1 tablet by mouth 2 times daily for 10 days   Refills:  0        azithromycin 250 MG tablet   Commonly known as:  ZITHROMAX   Quantity:  6 tablet        Two tablets first day, then one tablet daily for four days.   Refills:  0        bisacodyl 10 MG Suppository   Commonly known as:  DULCOLAX   Dose:  10 mg   Quantity:  25 suppository        Place 10 mg rectally daily as needed for constipation Reported on 3/24/2017   Refills:  1        fluticasone 50 MCG/ACT spray   Commonly known as:  FLONASE   Dose:  1-2 spray   Quantity:  1 Bottle        Spray 1-2 sprays into both nostrils daily   Refills:  11        hydrocortisone 25 MG Suppository   Commonly known as:  ANUSOL-HC   Dose:  25 mg   Quantity:  28 suppository        Place 1 suppository (25 mg) rectally 2 times daily   Refills:  1        MIRALAX powder   Dose:  1 capful   Quantity:  510 g   Generic drug:  polyethylene glycol        Take 1 capful by mouth daily Reported on 3/24/2017   Refills:  1                Prescriptions were sent or printed at these locations (2 Prescriptions)                   Other Prescriptions                Printed at Department/Unit printer (2 of 2)         amoxicillin-clavulanate  (AUGMENTIN) 875-125 MG per tablet               predniSONE (DELTASONE) 20 MG tablet                Procedures and tests performed during your visit     Basic metabolic panel    CBC with platelets + differential    Soft tissue neck CT w contrast      Orders Needing Specimen Collection     None      Pending Results     No orders found from 5/3/2017 to 5/6/2017.            Pending Culture Results     No orders found from 5/3/2017 to 5/6/2017.            Pending Results Instructions     If you had any lab results that were not finalized at the time of your Discharge, you can call the ED Lab Result RN at [phone]. You will be contacted by this team for any positive Lab results or changes in treatment. The nurses are available 7 days a week from 10A to 6:30P.  You can leave a message 24 hours per day and they will return your call.        Test Results From Your Hospital Stay        5/5/2017  1:06 PM      Component Results     Component Value Ref Range & Units Status    WBC 4.8 4.0 - 11.0 10e9/L Final    RBC Count 4.62 3.8 - 5.2 10e12/L Final    Hemoglobin 12.5 11.7 - 15.7 g/dL Final    Hematocrit 37.7 35.0 - 47.0 % Final    MCV 82 78 - 100 fl Final    MCH 27.1 26.5 - 33.0 pg Final    MCHC 33.2 31.5 - 36.5 g/dL Final    RDW 13.0 10.0 - 15.0 % Final    Platelet Count 328 150 - 450 10e9/L Final    Diff Method Automated Method  Final    % Neutrophils 47.3 % Final    % Lymphocytes 45.7 % Final    % Monocytes 5.3 % Final    % Eosinophils 1.3 % Final    % Basophils 0.4 % Final    % Immature Granulocytes 0.0 % Final    Nucleated RBCs 0 0 /100 Final    Absolute Neutrophil 2.3 1.6 - 8.3 10e9/L Final    Absolute Lymphocytes 2.2 0.8 - 5.3 10e9/L Final    Absolute Monocytes 0.3 0.0 - 1.3 10e9/L Final    Absolute Eosinophils 0.1 0.0 - 0.7 10e9/L Final    Absolute Basophils 0.0 0.0 - 0.2 10e9/L Final    Abs Immature Granulocytes 0.0 0 - 0.4 10e9/L Final    Absolute Nucleated RBC 0.0  Final         5/5/2017  1:18 PM      Component  Results     Component Value Ref Range & Units Status    Sodium 139 133 - 144 mmol/L Final    Potassium 3.9 3.4 - 5.3 mmol/L Final    Chloride 107 94 - 109 mmol/L Final    Carbon Dioxide 28 20 - 32 mmol/L Final    Anion Gap 4 3 - 14 mmol/L Final    Glucose 87 70 - 99 mg/dL Final    Urea Nitrogen 7 7 - 30 mg/dL Final    Creatinine 0.70 0.52 - 1.04 mg/dL Final    GFR Estimate 88 >60 mL/min/1.7m2 Final    Non  GFR Calc    GFR Estimate If Black >90   GFR Calc   >60 mL/min/1.7m2 Final    Calcium 9.2 8.5 - 10.1 mg/dL Final         5/5/2017  2:40 PM      Narrative     CT SCAN OF THE NECK WITHCONTRAST  5/5/2017 1:32 PM     HISTORY: mandiubular tenderness and edema with palpable mass at the  base of the left mandible angle .    TECHNIQUE:  Axial images and coronal reformations with 90mL Isovue-370  iv contrast material. Radiation dose for this scan was reduced using  automated exposure control, adjustment of the mA and/or kV according  to patient size, or iterative reconstruction technique.     COMPARISON: None.    FINDINGS: The area in question was marked with a marker. Marker  overlies the left parotid gland. There is a small nodule slightly  posterior to the marker. Its difficult to determine if this is arising  from the gland or adjacent to the gland. This nodule measures about  0.8 cm in transverse dimension and AP dimension and it measures about  1.2 cm in cephalocaudad dimension. It is just lateral and superficial  to the left retromandibular vein. It is seen on axial image 26 and  coronal image 73. No inflammation is seen surrounding this low dense  nodule.    Visualized sinuses, nasopharynx and orbits: Normal.      Tongue and oropharynx:  Normal.      Hypopharynx: Normal.      Larynx and trachea: Normal.      Upper mediastinum and lungs: Normal.     Thyroid gland: Normal.    Submandibular glands: Normal.  No calculi are seen along the course of  the submandibular ducts.    Parotid  glands: Normal.  No parotid gland mass lesions are identified.  Lymph nodes: Normal.      Vasculature: Normal.           Impression     IMPRESSION:     1. Palpable abnormality corresponds to a small nodule adjacent to the  tail of the left parotid gland. This could be arising from the gland  and it could represent a small primary salivary gland tumor. It could  also be due to a small lymph node adjacent to the gland. The low  density within the nodule could be due to necrosis. A first branchial  cleft cyst could also be located in this region. Biopsy would be  necessary for a definitive diagnosis.  2. The mandible appears normal.    ULISES VALLES MD                Clinical Quality Measure: Blood Pressure Screening     Your blood pressure was checked while you were in the emergency department today. The last reading we obtained was  BP: 125/71 . Please read the guidelines below about what these numbers mean and what you should do about them.  If your systolic blood pressure (the top number) is less than 120 and your diastolic blood pressure (the bottom number) is less than 80, then your blood pressure is normal. There is nothing more that you need to do about it.  If your systolic blood pressure (the top number) is 120-139 or your diastolic blood pressure (the bottom number) is 80-89, your blood pressure may be higher than it should be. You should have your blood pressure rechecked within a year by a primary care provider.  If your systolic blood pressure (the top number) is 140 or greater or your diastolic blood pressure (the bottom number) is 90 or greater, you may have high blood pressure. High blood pressure is treatable, but if left untreated over time it can put you at risk for heart attack, stroke, or kidney failure. You should have your blood pressure rechecked by a primary care provider within the next 4 weeks.  If your provider in the emergency department today gave you specific instructions to follow-up with  "your doctor or provider even sooner than that, you should follow that instruction and not wait for up to 4 weeks for your follow-up visit.        Thank you for choosing [facility]       Thank you for choosing [facility] for your care. Our goal is always to provide you with excellent care. Hearing back from our patients is one way we can continue to improve our services. Please take a few minutes to complete the written survey that you may receive in the mail after you visit with us. Thank you!        [employer] Information     [employer] lets you send messages to your doctor, view your test results, renew your prescriptions, schedule appointments and more. To sign up, go to [email or web address] . Click on \"Log in\" on the left side of the screen, which will take you to the Welcome page. Then click on \"Sign up Now\" on the right side of the page.     You will be asked to enter the access code listed below, as well as some personal information. Please follow the directions to create your username and password.     Your access code is: [email or web address]  Expires: 2017  9:01 PM     Your access code will  in 90 days. If you need help or a new code, please call your [facility] clinic or [phone].        Care EveryWhere ID     This is your Care EveryWhere ID. This could be used by other organizations to access your [facility] medical records  [id number]        After Visit Summary       This is your record. Keep this with you and show to your community pharmacist(s) and doctor(s) at your next visit.                  "

## 2017-05-05 NOTE — ED AVS SNAPSHOT
Emergency Department    [address] [place] [address]    Phone:  [phone]    Fax:  [phone]                                       Saritha Pearson   MRN: [id number]    Department:   Emergency Department   Date of Visit:  5/5/2017           After Visit Summary Signature Page     I have received my discharge instructions, and my questions have been answered. I have discussed any challenges I see with this plan with the nurse or doctor.    ..........................................................................................................................................  Patient/Patient Representative Signature      ..........................................................................................................................................  Patient Representative Print Name and Relationship to Patient    ..................................................               ................................................  Date                                            Time    ..........................................................................................................................................  Reviewed by Signature/Title    ...................................................              ..............................................  Date                                                            Time

## 2017-05-05 NOTE — ED PROVIDER NOTES
History     Chief Complaint:  Jaw Pain     HPI   Saritha Pearson is a 51 year old female who presents with left jaw pain. 3 days ago the pt developed sudden onset left sided jaw pain over the masseter muscle region while biting a tough pig skin. She notes increase pain with chewing has switched to just a soft diet. Also notes she can not open her mouth as wide. She went to a dental specialist today for continuation of her symptoms and given she has a palpable mass near the area of pain he told her to come to the ER for MRI. Pt denies difficulty swallowing or fever. She reports she went to clinic the other day and thought she had a sinus infection and was prescribed antibiotics, but she did not feel that this was consistent with her symptoms so she has not taken the antibiotics. She has been taking ibuprofen for pain.     Allergies:  Sulfa Drugs      Medications:    Fluticasone  Albuterol inhaler  Miralax  Dulcolax  Anusol suppository     Past Medical History:    Asthma  Constipation  GERD  PONV    Past Surgical History:    Abdomen surgery  Abdominoplasty  Cosmetic surgery  Excise lesion trunk  Gyn surgery  Hysterectomy    Family History:    CV Disease  Colorectal cancer  Breast cancer    Social History:  Tobacco use: Never  Alcohol use: Occasional  PCP: [facility]    Marital Status:  Single      Review of Systems   Constitutional: Negative for fever.   HENT: Positive for facial swelling (left mandible ). Negative for sore throat and trouble swallowing.    Respiratory: Negative for cough and shortness of breath.    Cardiovascular: Negative for chest pain.   Gastrointestinal: Negative for abdominal pain, nausea and vomiting.   Neurological: Negative for headaches.   All other systems reviewed and are negative.      Physical Exam     Patient Vitals for the past 24 hrs:   BP Temp Temp src Pulse Heart Rate Resp SpO2 Height Weight   05/05/17 1502 (!) 123/95 - - - 68 18 99 % - -   05/05/17 1157 125/71 97.6  F  "(36.4  C) Oral 63 - 16 99 % 1.676 m (5' 6\") 77.1 kg (170 lb)       Physical Exam  General: Alert, interactive, appears comfortable    Eye:  Pupils are equal, round, and reactive.      ENT:     Bilateral TM's intact and non bulging or erythematous. No effusion. No mastoid tenderness or edema.    No rhinorrhea.     Moist mucus membranes.  Oropharynx is clear with no exudates.     Uvula midline. No tongue swelling. No dental  tenderness or large visible cavities. Mild trismus. Palpable tenderness over the left mandible at the masseter muscle region with about a dime size firm palpable mass just inferior to the left ramus mandible angle.No overlying erythema.     Neck: No rigidity. Trachea is  Midline.               Cardiac:  Regular rate and rhythm. S1, S2.  No murmurs, gallops, or rubs.    Pulmonary:  Clear to auscultation bilaterally.  No wheezes or crackles.             Non labored. No use of accessory muscles.     Musculoskeletal:  Freely moveable extremities    Skin:  Warm and dry without rashes.    Neurologic:  Non-focal exam without asymmetric weakness or numbness.  GCS 15    Psychiatric:  Awake. Normal affect with appropriate interaction with examiner.      Emergency Department Course   Imaging:  Radiographic findings were communicated with the patient who voiced understanding of the findings.    CT Soft tissue neck with contrast:  IMPRESSION:     1. Palpable abnormality corresponds to a small nodule adjacent to the tail of the left parotid gland. This could be arising from the gland and it could represent a small primary salivary gland tumor. It could also be due to a small lymph node adjacent to the gland. The low density within the nodule could be due to necrosis. A first branchial cleft cyst could also be located in this region. Biopsy would be necessary for a definitive diagnosis.  2. The mandible appears normal.    ULISES VALLES MD    Results per radiology.    Laboratory:  CBC: WNL (WBC 4.8, HGB 12.5, PLT " 328)   BMP: WNL (Creatinine 0.70)     Emergency Department Course:  Past medical records, nursing notes, and vitals reviewed.  I performed an exam of the patient and obtained history as documented above.   Above workup undertaken.  I personally reviewed the laboratory results with the Patient and answered all related questions prior to discharge.    1452: I rechecked the patient.  Findings and plan explained to the Patient. Patient discharged home with instructions regarding supportive care, medications, and reasons to return. The importance of close follow-up was reviewed.      Impression & Plan      Medical Decision Making:  Saritha Pearson is a 50 yo female here with left jaw pain and neck nodule. On exam the pt is afebrile and well appearing. She has some mild trismus ( space between teeth is about 2cm). There is no clinical evidence of PTA or RPA. There is also no dental tenderness. Given mechanism of injury and presentation along with exam I do not appreciate abscess. There is dime size firm non pulsatile palpable nodule just inferior to left angle of the mandible. She also as masseter region tenderness. There is no evidence of sinusitis or AOM on my exam. CT of the neck soft tissue shows no concern for deep space infection and notes the indeterminate nodule. I told the pt that its possible this is cancer or may just be a reactive lymph node, but would need biopsy to be further evaluated. . I spoke with Dr. Taylor of ENT regarding this pts case and she recommended antibiotics and steroids in the event this is becoming infectious since its near the parotid gland. The pt should continue with warm compresses and ibuprofen along with a softened diet. Follow up with ENT next week. We discussed red flags including fever, worsening trismus, difficulty swallowing, swelling under the tongue to return immediately to the ED.     Diagnosis:    ICD-10-CM    1. Jaw pain R68.84    2. Neck nodule R22.1         Disposition:  Discharged to home.    Discharge Medications:  Discharge Medication List as of 5/5/2017  2:56 PM      START taking these medications    Details   predniSONE (DELTASONE) 20 MG tablet Take two tablets (= 40mg) each day for 5 (five) days, Disp-10 tablet, R-0, Local Print               Sierra Allen  5/5/2017    EMERGENCY DEPARTMENT       Sierra Allen PA-C  05/05/17 5879

## 2017-07-06 ENCOUNTER — TELEPHONE (OUTPATIENT)
Dept: FAMILY MEDICINE | Facility: CLINIC | Age: 52
End: 2017-07-06

## 2017-07-21 ENCOUNTER — CARE COORDINATION (OUTPATIENT)
Dept: CARE COORDINATION | Facility: CLINIC | Age: 52
End: 2017-07-21

## 2017-07-21 NOTE — PROGRESS NOTES
Clinic Care Coordination Contact  Care Team Conversations    SW contacted pt as part of proactive outreach list. SW educated pt on CC services and assessed for current need.     Pt describes being able to manage communication with providers and that there have been no difficulties in getting to appointments. Pt discussed her mammogram checkups have been behind due to scheduling on her part. Pt had asked for preventive mastectomy previously and was denied, as per pt.     [facility]  Care Coordination  [place] [facility] [place]  [phone]  [email or web address]

## 2017-07-21 NOTE — TELEPHONE ENCOUNTER
Pt does not remember where she gets her mammograms done but somewhere in [place] and does not wish to change this. She is due for one and will call [place] to schedule her mammogram. Pt did request to be transferred to scheduling for a dr's appt with Dr Gold. Will postpone to f/u with pt to see where her mammo is done when completed. NX

## 2017-07-24 ENCOUNTER — DOCUMENTATION ONLY (OUTPATIENT)
Dept: LAB | Facility: CLINIC | Age: 52
End: 2017-07-24

## 2017-07-24 DIAGNOSIS — Z00.00 ROUTINE GENERAL MEDICAL EXAMINATION AT A HEALTH CARE FACILITY: ICD-10-CM

## 2017-07-24 DIAGNOSIS — Z13.220 SCREENING FOR CHOLESTEROL LEVEL: Primary | ICD-10-CM

## 2017-07-24 NOTE — PROGRESS NOTES
This patient is scheduled for pre-visit lab work on 7/26/2017 but does not qualify for pre-visit protocol. Please place future orders, or have your care team call and advise patient to cancel lab appointment. She has an appointment with Dr. Gold on 8/1/2017.    Thank you,    [facility]

## 2017-07-26 ENCOUNTER — TELEPHONE (OUTPATIENT)
Dept: FAMILY MEDICINE | Facility: CLINIC | Age: 52
End: 2017-07-26

## 2017-07-26 DIAGNOSIS — Z11.3 SCREEN FOR STD (SEXUALLY TRANSMITTED DISEASE): Primary | ICD-10-CM

## 2017-07-26 NOTE — TELEPHONE ENCOUNTER
Patient is calling stating coming in for lab work today, and would like order for STD as well. Thank you.

## 2017-07-27 DIAGNOSIS — Z11.3 SCREEN FOR STD (SEXUALLY TRANSMITTED DISEASE): ICD-10-CM

## 2017-07-27 DIAGNOSIS — Z00.00 ROUTINE GENERAL MEDICAL EXAMINATION AT A HEALTH CARE FACILITY: ICD-10-CM

## 2017-07-27 DIAGNOSIS — Z13.220 SCREENING FOR CHOLESTEROL LEVEL: ICD-10-CM

## 2017-07-27 LAB
CHOLEST SERPL-MCNC: 168 MG/DL
HDLC SERPL-MCNC: 65 MG/DL
LDLC SERPL CALC-MCNC: 87 MG/DL
NONHDLC SERPL-MCNC: 103 MG/DL
TRIGL SERPL-MCNC: 81 MG/DL

## 2017-07-27 PROCEDURE — 87491 CHLMYD TRACH DNA AMP PROBE: CPT | Performed by: FAMILY MEDICINE

## 2017-07-27 PROCEDURE — 87389 HIV-1 AG W/HIV-1&-2 AB AG IA: CPT | Performed by: FAMILY MEDICINE

## 2017-07-27 PROCEDURE — 87591 N.GONORRHOEAE DNA AMP PROB: CPT | Performed by: FAMILY MEDICINE

## 2017-07-27 PROCEDURE — 86780 TREPONEMA PALLIDUM: CPT | Performed by: FAMILY MEDICINE

## 2017-07-27 PROCEDURE — 36415 COLL VENOUS BLD VENIPUNCTURE: CPT | Performed by: FAMILY MEDICINE

## 2017-07-28 LAB
C TRACH DNA SPEC QL NAA+PROBE: NORMAL
HIV 1+2 AB+HIV1 P24 AG SERPL QL IA: NORMAL
N GONORRHOEA DNA SPEC QL NAA+PROBE: NORMAL
SPECIMEN SOURCE: NORMAL
SPECIMEN SOURCE: NORMAL
T PALLIDUM IGG+IGM SER QL: NEGATIVE

## 2017-08-02 NOTE — PROGRESS NOTES
SUBJECTIVE:   CC: Saritha Pearson is an 51 year old woman who presents for preventive health visit.     Left maxillary sinusitis - has had left sided pressure and nasal congestion for 2 weeks. No fevers. Augmentin has worked well for her in the past. I rx'd that.      Constipation - follows with GI. Miralax daily works. Symptoms come back if she misses doses.    CT ABDOMEN AND PELVIS WITH CONTRAST 1/27/2016 2:50 PM             IMPRESSION:  1. Fluid distention of the colon without obvious obstructing mass or  debris. Oral contrast reaches the right colon and the small bowel is  nondistended. No evidence of obstruction or diverticulitis. Appendix  is normal.  2. Evidence of old granulomatous disease.    Overweight - Diet and exercise discussed.    Wt Readings from Last 5 Encounters:   08/08/17 184 lb (83.5 kg)   05/05/17 170 lb (77.1 kg)   05/03/17 186 lb 3.2 oz (84.5 kg)   03/24/17 185 lb 12.8 oz (84.3 kg)   03/03/17 188 lb (85.3 kg)     Body mass index is 29.7 kg/(m^2).    Surgical history:   Multiple cosmetic surgeries   Hysterectomy - supracervical?   Right knee arthroscopic surgery    Preventive:    Immunization History   Administered Date(s) Administered     Tdap (Adacel,Boostrix) 12/03/2008       Lipids screen:    Recent Labs   Lab Test  07/27/17   1432  04/26/16   1157   CHOL  168  147   HDL  65  64   LDL  87  66   TRIG  81  85         STD screen: negative HIV, syphilis, GC/chlamydia 7/27/17    Anemia screen:     CBC RESULTS:   Recent Labs   Lab Test  05/05/17   1254   WBC  4.8   RBC  4.62   HGB  12.5   HCT  37.7   MCV  82   MCH  27.1   MCHC  33.2   RDW  13.0   PLT  328       Diabetes screen:     Last Basic Metabolic Panel:  Lab Results   Component Value Date     05/05/2017      Lab Results   Component Value Date    POTASSIUM 3.9 05/05/2017     Lab Results   Component Value Date    CHLORIDE 107 05/05/2017     Lab Results   Component Value Date    BENJAMIN 9.2 05/05/2017     Lab Results   Component Value  Date    CO2 28 05/05/2017     Lab Results   Component Value Date    BUN 7 05/05/2017     Lab Results   Component Value Date    CR 0.70 05/05/2017     Lab Results   Component Value Date    GLC 87 05/05/2017       Mammogram: upcoming appointment 8/17/17    Contraception: hysterectomy, supracervical?    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  Exercise: treadmill 3 times per week  Tobacco: no  Etoh: 1-2 per week  Recreational drugs: no  Caffeine: coffee 1 per week      Today's PHQ-2 Score:   PHQ-2 ( 1999 Pfizer) 8/8/2017 7/13/2016   Q1: Little interest or pleasure in doing things 0 0   Q2: Feeling down, depressed or hopeless 0 0   PHQ-2 Score 0 0         Abuse: Current or Past(Physical, Sexual or Emotional)- No  Do you feel safe in your environment - Yes  Social History   Substance Use Topics     Smoking status: Never Smoker     Smokeless tobacco: Never Used     Alcohol use Yes      Comment: occasionally     The patient does not drink >3 drinks per day nor >7 drinks per week.    Reviewed orders with patient.  Reviewed health maintenance and updated orders accordingly - Yes    ROS:  C: NEGATIVE for fever, chills, change in weight  I: NEGATIVE for worrisome rashes, moles or lesions  E: NEGATIVE for vision changes or irritation  ENT: NEGATIVE for ear, mouth and throat problems  R: NEGATIVE for significant cough or SOB  B: NEGATIVE for masses, tenderness or discharge  CV: NEGATIVE for chest pain, palpitations or peripheral edema  GI: NEGATIVE for nausea, abdominal pain, heartburn, or change in bowel habits  : NEGATIVE for unusual urinary or vaginal symptoms. No vaginal bleeding.  M: NEGATIVE for significant arthralgias or myalgia  N: NEGATIVE for weakness, dizziness or paresthesias  P: NEGATIVE for changes in mood or affect     OBJECTIVE:   /74 (Cuff Size: Adult Large)  Pulse 74  Temp 98.4  " F (36.9  C) (Oral)  Ht 5' 6\" (1.676 m)  Wt 184 lb (83.5 kg)  SpO2 100%  BMI 29.7 kg/m2  EXAM:  GENERAL: healthy, alert and no distress  EYES: Eyes grossly normal to inspection, PERRL and conjunctivae and sclerae normal  HENT: ear canals and TM's normal, nose and mouth without ulcers or lesions  NECK: no adenopathy, no asymmetry, masses, or scars and thyroid normal to palpation  RESP: lungs clear to auscultation - no rales, rhonchi or wheezes  BREAST: normal without masses, tenderness or nipple discharge and no palpable axillary masses or adenopathy  CV: regular rate and rhythm, normal S1 S2, no S3 or S4, no murmur, click or rub, no peripheral edema and peripheral pulses strong  ABDOMEN: soft, nontender, no hepatosplenomegaly, no masses and bowel sounds normal   (female): deferred  MS: no gross musculoskeletal defects noted, no edema  SKIN: no suspicious lesions or rashes  NEURO: Normal strength and tone, mentation intact and speech normal  PSYCH: mentation appears normal, affect normal/bright    ASSESSMENT/PLAN:     COUNSELING:   Reviewed preventive health counseling, as reflected in patient instructions       Regular exercise       Healthy diet/nutrition     reports that she has never smoked. She has never used smokeless tobacco.    Estimated body mass index is 27.44 kg/(m^2) as calculated from the following:    Height as of 5/5/17: 5' 6\" (1.676 m).    Weight as of 5/5/17: 170 lb (77.1 kg).   Weight management plan: Discussed healthy diet and exercise guidelines and patient will follow up in 12 months in clinic to re-evaluate.    Assessment and Plan - Decision Making    1. Routine general medical examination at a health care facility    Results of today's exam given to patient verbally along with age appropriate preventive measures. Written instructions reviewed and handed to the patient.    2. Acute maxillary sinusitis, recurrence not specified  Per HPI  - amoxicillin-clavulanate (AUGMENTIN) 875-125 MG per " tablet; Take 1 tablet by mouth 2 times daily for 7 days  Dispense: 14 tablet; Refill: 0      Written instructions given as follows:    Patient Instructions     1. I recommend including veggies, fresh fruits (3 to 5 servings), nuts (unsalted) in your daily diet. Cut down on carbs like bread, pasta, rice, potatoes. Cut down on red meats like burgers etc. Increase healthy proteins like beans, tofu, tuna, chicken and turkey.    2. Get regular exercise like jogging, biking, swimming at least 3 times per week.      3. Do self breast exams monthly. Report any lumps. Please have the upcoming mammogram report sent to me - fax number [phone]    4. See the dentist at least once per year. Eye doctor every 2 years.    5. If all is well, see you back in one year.

## 2017-08-02 NOTE — PATIENT INSTRUCTIONS
1. I recommend including veggies, fresh fruits (3 to 5 servings), nuts (unsalted) in your daily diet. Cut down on carbs like bread, pasta, rice, potatoes. Cut down on red meats like burgers etc. Increase healthy proteins like beans, tofu, tuna, chicken and turkey.    2. Get regular exercise like jogging, biking, swimming at least 3 times per week.      3. Do self breast exams monthly. Report any lumps. Please have the upcoming mammogram report sent to me - fax number [phone]    4. See the dentist at least once per year. Eye doctor every 2 years.    5. If all is well, see you back in one year.

## 2017-08-08 ENCOUNTER — OFFICE VISIT (OUTPATIENT)
Dept: FAMILY MEDICINE | Facility: CLINIC | Age: 52
End: 2017-08-08
Payer: COMMERCIAL

## 2017-08-08 VITALS
WEIGHT: 184 LBS | OXYGEN SATURATION: 100 % | HEART RATE: 74 BPM | DIASTOLIC BLOOD PRESSURE: 74 MMHG | SYSTOLIC BLOOD PRESSURE: 108 MMHG | TEMPERATURE: 98.4 F | BODY MASS INDEX: 29.57 KG/M2 | HEIGHT: 66 IN

## 2017-08-08 DIAGNOSIS — Z00.00 ROUTINE GENERAL MEDICAL EXAMINATION AT A HEALTH CARE FACILITY: Primary | ICD-10-CM

## 2017-08-08 DIAGNOSIS — J01.00 ACUTE MAXILLARY SINUSITIS, RECURRENCE NOT SPECIFIED: ICD-10-CM

## 2017-08-08 PROCEDURE — 99396 PREV VISIT EST AGE 40-64: CPT | Performed by: FAMILY MEDICINE

## 2017-08-08 NOTE — MR AVS SNAPSHOT
After Visit Summary   8/8/2017    Saritha Pearson    MRN: [id number]           Patient Information     Date Of Birth          1965        Visit Information        Provider Department      8/8/2017 2:30 PM Lalo Gold MD [facility]        Today's Diagnoses     Routine general medical examination at a health care facility    -  1    Acute maxillary sinusitis, recurrence not specified          Care Instructions      1. I recommend including veggies, fresh fruits (3 to 5 servings), nuts (unsalted) in your daily diet. Cut down on carbs like bread, pasta, rice, potatoes. Cut down on red meats like burgers etc. Increase healthy proteins like beans, tofu, tuna, chicken and turkey.    2. Get regular exercise like jogging, biking, swimming at least 3 times per week.      3. Do self breast exams monthly. Report any lumps. Please have the upcoming mammogram report sent to me - fax number [phone]    4. See the dentist at least once per year. Eye doctor every 2 years.    5. If all is well, see you back in one year.                      Follow-ups after your visit        Who to contact     If you have questions or need follow up information about today's clinic visit or your schedule please contact [facility] directly at [phone].  Normal or non-critical lab and imaging results will be communicated to you by MyChart, letter or phone within 4 business days after the clinic has received the results. If you do not hear from us within 7 days, please contact the clinic through [facility] or phone. If you have a critical or abnormal lab result, we will notify you by phone as soon as possible.  Submit refill requests through [facility] or call your pharmacy and they will forward the refill request to us. Please allow 3 business days for your refill to be completed.          Additional Information About Your Visit        [facility] Information     [facility] lets you send messages to your  "doctor, view your test results, renew your prescriptions, schedule appointments and more. To sign up, go to [email or web address] . Click on \"Log in\" on the left side of the screen, which will take you to the Welcome page. Then click on \"Sign up Now\" on the right side of the page.     You will be asked to enter the access code listed below, as well as some personal information. Please follow the directions to create your username and password.     Your access code is: [email or web address]  Expires: 2017  4:55 PM     Your access code will  in 90 days. If you need help or a new code, please call your [place] clinic or [phone].        Care EveryWhere ID     This is your Care EveryWhere ID. This could be used by other organizations to access your [place] medical records  [id number]        Your Vitals Were     Pulse Temperature Height Pulse Oximetry BMI (Body Mass Index)       74 98.4  F (36.9  C) (Oral) 5' 6\" (1.676 m) 100% 29.7 kg/m2        Blood Pressure from Last 3 Encounters:   17 108/74   17 (!) 123/95   17 119/75    Weight from Last 3 Encounters:   17 184 lb (83.5 kg)   17 170 lb (77.1 kg)   17 186 lb 3.2 oz (84.5 kg)              Today, you had the following     No orders found for display       Primary Care Provider Office Phone # Fax #    Laloigor Gold [phone]       [facility] [address] [facility] [address]        Equal Access to Services     JULIO MORALES : Hadii cristi Gibbs, katyada emory, qaybta kaalmaray grant. So [facility] [phone].    ATENCIÓN: Si habla español, tiene a thompson disposición servicios gratuitos de asistencia lingüística. Llame al [phone].    We comply with applicable federal civil rights laws and [place] laws. We do not discriminate on the basis of race, color, national origin, age, disability sex, sexual orientation or gender " identity.            Thank you!     Thank you for choosing [facility]  for your care. Our goal is always to provide you with excellent care. Hearing back from our patients is one way we can continue to improve our services. Please take a few minutes to complete the written survey that you may receive in the mail after your visit with us. Thank you!             Your Updated Medication List - Protect others around you: Learn how to safely use, store and throw away your medicines at www.disposemymeds.org.          This list is accurate as of: 8/8/17  3:20 PM.  Always use your most recent med list.                   Brand Name Dispense Instructions for use Diagnosis    albuterol 108 (90 BASE) MCG/ACT Inhaler    PROAIR HFA/PROVENTIL HFA/VENTOLIN HFA    1 Inhaler    Inhale 2 puffs into the lungs every 4 hours as needed for shortness of breath / dyspnea or wheezing    Acute bronchitis, unspecified organism       bisacodyl 10 MG Suppository    DULCOLAX    25 suppository    Place 10 mg rectally daily as needed for constipation Reported on 3/24/2017        fluticasone 50 MCG/ACT spray    FLONASE    1 Bottle    Spray 1-2 sprays into both nostrils daily    Acute sinusitis with symptoms > 10 days       hydrocortisone 25 MG Suppository    ANUSOL-HC    28 suppository    Place 1 suppository (25 mg) rectally 2 times daily    Bleeding internal hemorrhoids       MIRALAX powder   Generic drug:  polyethylene glycol     510 g    Take 1 capful by mouth daily Reported on 3/24/2017    Constipation, unspecified constipation type

## 2017-08-08 NOTE — NURSING NOTE
"Chief Complaint   Patient presents with     Physical       Initial /74 (Cuff Size: Adult Large)  Pulse 74  Temp 98.4  F (36.9  C) (Oral)  Ht 5' 6\" (1.676 m)  Wt 184 lb (83.5 kg)  SpO2 100%  BMI 29.7 kg/m2 Estimated body mass index is 29.7 kg/(m^2) as calculated from the following:    Height as of this encounter: 5' 6\" (1.676 m).    Weight as of this encounter: 184 lb (83.5 kg).  Medication Reconciliation: complete    Keri Medina LPN    "

## 2017-08-17 ENCOUNTER — TRANSFERRED RECORDS (OUTPATIENT)
Dept: HEALTH INFORMATION MANAGEMENT | Facility: CLINIC | Age: 52
End: 2017-08-17

## 2017-08-22 ENCOUNTER — TELEPHONE (OUTPATIENT)
Dept: FAMILY MEDICINE | Facility: CLINIC | Age: 52
End: 2017-08-22

## 2017-08-22 NOTE — TELEPHONE ENCOUNTER
Please abstract the following data from this visit with this patient into the appropriate field in Epic:    Mammogram done on this date: 08- (approximately), by this group: [facility] in [place], results were repeat in one year.     Copy of mammogram report was sent to scanning.

## 2018-03-21 ENCOUNTER — TRANSFERRED RECORDS (OUTPATIENT)
Dept: HEALTH INFORMATION MANAGEMENT | Facility: CLINIC | Age: 53
End: 2018-03-21

## 2018-04-22 ENCOUNTER — HOSPITAL ENCOUNTER (EMERGENCY)
Facility: CLINIC | Age: 53
Discharge: HOME | End: 2018-04-22
Attending: EMERGENCY MEDICINE | Admitting: EMERGENCY MEDICINE
Payer: COMMERCIAL

## 2018-04-22 VITALS
SYSTOLIC BLOOD PRESSURE: 124 MMHG | RESPIRATION RATE: 18 BRPM | DIASTOLIC BLOOD PRESSURE: 75 MMHG | TEMPERATURE: 97 F | OXYGEN SATURATION: 97 %

## 2018-04-22 DIAGNOSIS — G47.10 EXCESSIVE SLEEPINESS: ICD-10-CM

## 2018-04-22 LAB
ALBUMIN SERPL-MCNC: 4 G/DL (ref 3.4–5)
ALP SERPL-CCNC: 68 U/L (ref 40–150)
ALT SERPL W P-5'-P-CCNC: 23 U/L (ref 0–50)
ANION GAP SERPL CALCULATED.3IONS-SCNC: 9 MMOL/L (ref 3–14)
AST SERPL W P-5'-P-CCNC: 19 U/L (ref 0–45)
BASOPHILS # BLD AUTO: 0 10E9/L (ref 0–0.2)
BASOPHILS NFR BLD AUTO: 0.3 %
BILIRUB DIRECT SERPL-MCNC: 0.1 MG/DL (ref 0–0.2)
BILIRUB SERPL-MCNC: 0.2 MG/DL (ref 0.2–1.3)
BUN SERPL-MCNC: 7 MG/DL (ref 7–30)
CALCIUM SERPL-MCNC: 9.3 MG/DL (ref 8.5–10.1)
CHLORIDE SERPL-SCNC: 106 MMOL/L (ref 94–109)
CO2 SERPL-SCNC: 25 MMOL/L (ref 20–32)
CREAT SERPL-MCNC: 0.78 MG/DL (ref 0.52–1.04)
DIFFERENTIAL METHOD BLD: NORMAL
EOSINOPHIL # BLD AUTO: 0 10E9/L (ref 0–0.7)
EOSINOPHIL NFR BLD AUTO: 0.5 %
ERYTHROCYTE [DISTWIDTH] IN BLOOD BY AUTOMATED COUNT: 13.2 % (ref 10–15)
ETHANOL SERPL-MCNC: 0.13 G/DL
GFR SERPL CREATININE-BSD FRML MDRD: 77 ML/MIN/1.7M2
GLUCOSE SERPL-MCNC: 111 MG/DL (ref 70–99)
HCT VFR BLD AUTO: 37.7 % (ref 35–47)
HGB BLD-MCNC: 12.5 G/DL (ref 11.7–15.7)
IMM GRANULOCYTES # BLD: 0 10E9/L (ref 0–0.4)
IMM GRANULOCYTES NFR BLD: 0.3 %
LIPASE SERPL-CCNC: 136 U/L (ref 73–393)
LYMPHOCYTES # BLD AUTO: 1.8 10E9/L (ref 0.8–5.3)
LYMPHOCYTES NFR BLD AUTO: 26.9 %
MCH RBC QN AUTO: 26.7 PG (ref 26.5–33)
MCHC RBC AUTO-ENTMCNC: 33.2 G/DL (ref 31.5–36.5)
MCV RBC AUTO: 81 FL (ref 78–100)
MONOCYTES # BLD AUTO: 0.3 10E9/L (ref 0–1.3)
MONOCYTES NFR BLD AUTO: 5.1 %
NEUTROPHILS # BLD AUTO: 4.5 10E9/L (ref 1.6–8.3)
NEUTROPHILS NFR BLD AUTO: 66.9 %
PLATELET # BLD AUTO: 330 10E9/L (ref 150–450)
POTASSIUM SERPL-SCNC: 3.4 MMOL/L (ref 3.4–5.3)
PROT SERPL-MCNC: 7.9 G/DL (ref 6.8–8.8)
RBC # BLD AUTO: 4.68 10E12/L (ref 3.8–5.2)
SODIUM SERPL-SCNC: 140 MMOL/L (ref 133–144)
WBC # BLD AUTO: 6.7 10E9/L (ref 4–11)

## 2018-04-22 PROCEDURE — 25000128 H RX IP 250 OP 636: Performed by: EMERGENCY MEDICINE

## 2018-04-22 PROCEDURE — 96375 TX/PRO/DX INJ NEW DRUG ADDON: CPT

## 2018-04-22 PROCEDURE — 85025 COMPLETE CBC W/AUTO DIFF WBC: CPT | Performed by: EMERGENCY MEDICINE

## 2018-04-22 PROCEDURE — 80076 HEPATIC FUNCTION PANEL: CPT | Performed by: EMERGENCY MEDICINE

## 2018-04-22 PROCEDURE — 25000125 ZZHC RX 250: Performed by: EMERGENCY MEDICINE

## 2018-04-22 PROCEDURE — 80320 DRUG SCREEN QUANTALCOHOLS: CPT | Performed by: EMERGENCY MEDICINE

## 2018-04-22 PROCEDURE — 80048 BASIC METABOLIC PNL TOTAL CA: CPT | Performed by: EMERGENCY MEDICINE

## 2018-04-22 PROCEDURE — 83690 ASSAY OF LIPASE: CPT | Performed by: EMERGENCY MEDICINE

## 2018-04-22 PROCEDURE — 96361 HYDRATE IV INFUSION ADD-ON: CPT

## 2018-04-22 PROCEDURE — 96374 THER/PROPH/DIAG INJ IV PUSH: CPT

## 2018-04-22 PROCEDURE — 99284 EMERGENCY DEPT VISIT MOD MDM: CPT | Mod: 25

## 2018-04-22 RX ORDER — ONDANSETRON 2 MG/ML
4 INJECTION INTRAMUSCULAR; INTRAVENOUS ONCE
Status: COMPLETED | OUTPATIENT
Start: 2018-04-22 | End: 2018-04-22

## 2018-04-22 RX ADMIN — SODIUM CHLORIDE 1000 ML: 9 INJECTION, SOLUTION INTRAVENOUS at 05:51

## 2018-04-22 RX ADMIN — PANTOPRAZOLE SODIUM 40 MG: 40 INJECTION, POWDER, FOR SOLUTION INTRAVENOUS at 06:09

## 2018-04-22 RX ADMIN — ONDANSETRON 4 MG: 2 INJECTION INTRAMUSCULAR; INTRAVENOUS at 06:09

## 2018-04-22 ASSESSMENT — ENCOUNTER SYMPTOMS
NAUSEA: 1
VOMITING: 1
DIARRHEA: 0
APPETITE CHANGE: 1
FEVER: 0
ABDOMINAL PAIN: 0

## 2018-04-22 NOTE — ED AVS SNAPSHOT
Emergency Department    [address] [facility] [address]    Phone:  [phone]    Fax:  [phone]                                       Saritha Pearson   MRN: [id number]    Department:   Emergency Department   Date of Visit:  4/22/2018           Patient Information     Date Of Birth          1965        Your diagnoses for this visit were:     Excessive sleepiness        You were seen by Kirk Prajapati MD and Kanwal Farrell MD.      Follow-up Information     Follow up with Lalo Gold MD. Schedule an appointment as soon as possible for a visit in 3 days.    Specialty:  Family Practice    Contact information:    [address] [facility] [address] [phone]          Follow up with  Emergency Department.    Specialty:  EMERGENCY MEDICINE    Why:  As needed, If symptoms worsen    Contact information:    [address] [facility] [id number] [phone]        Discharge Instructions       Discharge Instructions  Alcohol Intoxication    You have been seen today with alcohol intoxication. This means that you have enough alcohol in your system to impair your ability to mentally and physically function. When you are intoxicated, we are not allowed to release you without a sober adult to be with you. You may not drive, operate dangerous equipment, or do anything else dangerous until you are sober.    You may have come to the Emergency Department because of your intoxication, or for another reason, such as because of an injury. No matter what the case is, this visit is a  red flag  regarding alcohol use, and you should consider whether your drinking pattern is a problem for you.     You may be at risk for alcohol-related problems if:      Men: you drink more than 14 drinks per week, or more than 4 drinks per occasion.      Women: you drink more than 7 drinks per week or more than 3 drinks per occasion.      You have black-outs.    You do things you regret while  drinking.    You have legal problems because of drinking (DUI).    You have job problems because of drinking (you call in sick to work because of drinking).    CAGE Questions    Have you ever felt you should cut down on your drinking?    Have people annoyed you by criticizing your drinking?    Have you ever felt bad or guilty about your drinking?    Have you ever had a drink first thing in the morning to steady your nerves or get rid of a hangover (eye opener)?    If you answer yes to any of the CAGE questions, you may have a problem with alcohol.      Return to the Emergency Department if:    You become shaky or tremble when you try to stop drinking.      You have a seizure or pass out.      You throw up (vomit) blood. This may be bright red or it may look like black coffee grounds.      You have blood in the stool. This may be bright red or appear as a black, tarry, bad smelling stool.      You become lightheaded or faint.      For further help, contact:     Your caregiver.      Alcoholics Anonymous (AA).      A drug or alcohol rehabilitation program.      You can get information on alcohol resources and groups by calling the number 695 or [phone] on any phone.       Seek medical care if:    You have persistent vomiting.      You have persistent pain in any part of your body.      You do not feel better after a few days.    If you were given a prescription for medicine here today, be sure to read all of the information (including the package insert) that comes with your prescription.  This will include important information about the medicine, its side effects, and any warnings that you need to know about.  The pharmacist who fills the prescription can provide more information and answer questions you may have about the medicine.  If you have questions or concerns that the pharmacist cannot address, please call or return to the Emergency Department.   Remember that you can always come back to the Emergency  Department if you are not able to see your regular doctor in the amount of time listed above, if you get any new symptoms, or if there is anything that worries you.          24 Hour Appointment Hotline       To make an appointment at any [facility], call [phone] ([phone]). If you don't have a family doctor or clinic, we will help you find one. [facility] clinics are conveniently located to serve the needs of you and your family.             Review of your medicines      Our records show that you are taking the medicines listed below. If these are incorrect, please call your family doctor or clinic.        Dose / Directions Last dose taken    albuterol 108 (90 Base) MCG/ACT Inhaler   Commonly known as:  PROAIR HFA/PROVENTIL HFA/VENTOLIN HFA   Dose:  2 puff   Quantity:  1 Inhaler        Inhale 2 puffs into the lungs every 4 hours as needed for shortness of breath / dyspnea or wheezing   Refills:  1        bisacodyl 10 MG Suppository   Commonly known as:  DULCOLAX   Dose:  10 mg   Quantity:  25 suppository        Place 10 mg rectally daily as needed for constipation Reported on 3/24/2017   Refills:  1        fluticasone 50 MCG/ACT spray   Commonly known as:  FLONASE   Dose:  1-2 spray   Quantity:  1 Bottle        Spray 1-2 sprays into both nostrils daily   Refills:  11        hydrocortisone 25 MG Suppository   Commonly known as:  ANUSOL-HC   Dose:  25 mg   Quantity:  28 suppository        Place 1 suppository (25 mg) rectally 2 times daily   Refills:  1        MIRALAX powder   Dose:  1 capful   Quantity:  510 g   Generic drug:  polyethylene glycol        Take 1 capful by mouth daily Reported on 3/24/2017   Refills:  1                Procedures and tests performed during your visit     Alcohol level blood    Basic metabolic panel    CBC with platelets + differential    Hepatic panel    Lipase    Peripheral IV catheter      Orders Needing Specimen Collection     Ordered          04/22/18 4894  Drug abuse  screen urine - STAT, [facility]: STAT, Needs to be Collected     Scheduled Task Status   04/22/18 0557 Collect Drug abuse screen urine Open   Order Class:  PCU Collect                  Pending Results     No orders found from 4/20/2018 to 4/23/2018.            Pending Culture Results     No orders found from 4/20/2018 to 4/23/2018.            Pending Results Instructions     If you had any lab results that were not finalized at the time of your Discharge, you can call the ED Lab Result RN at [phone]. You will be contacted by this team for any positive Lab results or changes in treatment. The nurses are available 7 days a week from 10A to 6:30P.  You can leave a message 24 hours per day and they will return your call.        Test Results From Your Hospital Stay        4/22/2018  5:59 AM      Component Results     Component Value Ref Range & Units Status    WBC 6.7 4.0 - 11.0 10e9/L Final    RBC Count 4.68 3.8 - 5.2 10e12/L Final    Hemoglobin 12.5 11.7 - 15.7 g/dL Final    Hematocrit 37.7 35.0 - 47.0 % Final    MCV 81 78 - 100 fl Final    MCH 26.7 26.5 - 33.0 pg Final    MCHC 33.2 31.5 - 36.5 g/dL Final    RDW 13.2 10.0 - 15.0 % Final    Platelet Count 330 150 - 450 10e9/L Final    Diff Method Automated Method  Final    % Neutrophils 66.9 % Final    % Lymphocytes 26.9 % Final    % Monocytes 5.1 % Final    % Eosinophils 0.5 % Final    % Basophils 0.3 % Final    % Immature Granulocytes 0.3 % Final    Absolute Neutrophil 4.5 1.6 - 8.3 10e9/L Final    Absolute Lymphocytes 1.8 0.8 - 5.3 10e9/L Final    Absolute Monocytes 0.3 0.0 - 1.3 10e9/L Final    Absolute Eosinophils 0.0 0.0 - 0.7 10e9/L Final    Absolute Basophils 0.0 0.0 - 0.2 10e9/L Final    Abs Immature Granulocytes 0.0 0 - 0.4 10e9/L Final         4/22/2018  6:14 AM      Component Results     Component Value Ref Range & Units Status    Sodium 140 133 - 144 mmol/L Final    Potassium 3.4 3.4 - 5.3 mmol/L Final    Chloride 106 94 - 109 mmol/L Final    Carbon Dioxide  25 20 - 32 mmol/L Final    Anion Gap 9 3 - 14 mmol/L Final    Glucose 111 (H) 70 - 99 mg/dL Final    Urea Nitrogen 7 7 - 30 mg/dL Final    Creatinine 0.78 0.52 - 1.04 mg/dL Final    GFR Estimate 77 >60 mL/min/1.7m2 Final    Non  GFR Calc    GFR Estimate If Black >90 >60 mL/min/1.7m2 Final    African American GFR Calc    Calcium 9.3 8.5 - 10.1 mg/dL Final         4/22/2018  6:14 AM      Component Results     Component Value Ref Range & Units Status    Ethanol g/dL 0.13 (H) <0.01 g/dL Final         4/22/2018  6:16 AM      Component Results     Component Value Ref Range & Units Status    Bilirubin Direct 0.1 0.0 - 0.2 mg/dL Final    Bilirubin Total 0.2 0.2 - 1.3 mg/dL Final    Albumin 4.0 3.4 - 5.0 g/dL Final    Protein Total 7.9 6.8 - 8.8 g/dL Final    Alkaline Phosphatase 68 40 - 150 U/L Final    ALT 23 0 - 50 U/L Final    AST 19 0 - 45 U/L Final         4/22/2018  6:14 AM      Component Results     Component Value Ref Range & Units Status    Lipase 136 73 - 393 U/L Final                Clinical Quality Measure: Blood Pressure Screening     Your blood pressure was checked while you were in the emergency department today. The last reading we obtained was  BP: 124/75 . Please read the guidelines below about what these numbers mean and what you should do about them.  If your systolic blood pressure (the top number) is less than 120 and your diastolic blood pressure (the bottom number) is less than 80, then your blood pressure is normal. There is nothing more that you need to do about it.  If your systolic blood pressure (the top number) is 120-139 or your diastolic blood pressure (the bottom number) is 80-89, your blood pressure may be higher than it should be. You should have your blood pressure rechecked within a year by a primary care provider.  If your systolic blood pressure (the top number) is 140 or greater or your diastolic blood pressure (the bottom number) is 90 or greater, you may have high  "blood pressure. High blood pressure is treatable, but if left untreated over time it can put you at risk for heart attack, stroke, or kidney failure. You should have your blood pressure rechecked by a primary care provider within the next 4 weeks.  If your provider in the emergency department today gave you specific instructions to follow-up with your doctor or provider even sooner than that, you should follow that instruction and not wait for up to 4 weeks for your follow-up visit.        Thank you for choosing [facility]       Thank you for choosing [facility] for your care. Our goal is always to provide you with excellent care. Hearing back from our patients is one way we can continue to improve our services. Please take a few minutes to complete the written survey that you may receive in the mail after you visit with us. Thank you!        [employer] Information     [facility] lets you send messages to your doctor, view your test results, renew your prescriptions, schedule appointments and more. To sign up, go to [email or web address] . Click on \"Log in\" on the left side of the screen, which will take you to the Welcome page. Then click on \"Sign up Now\" on the right side of the page.     You will be asked to enter the access code listed below, as well as some personal information. Please follow the directions to create your username and password.     Your access code is: [email or web address]  Expires: 2018 10:25 AM     Your access code will  in 90 days. If you need help or a new code, please call your [facility] clinic or [phone].        Care EveryWhere ID     This is your Care EveryWhere ID. This could be used by other organizations to access your [facility] medical records  [id number]        Equal Access to Services     JULIO MORALES : ray Wilkins qaybta kaalmada adeegyada, waxay idiin hayaan adeeg kharash la'aan ah. [facility] [phone].    ATENCIÓN: Si habla español, tiene a thompson " disposición servicios gratuitos de asistencia lingüística. Mariah al [phone].    We comply with applicable federal civil rights laws and [place] laws. We do not discriminate on the basis of race, color, national origin, age, disability, sex, sexual orientation, or gender identity.            After Visit Summary       This is your record. Keep this with you and show to your community pharmacist(s) and doctor(s) at your next visit.

## 2018-04-22 NOTE — ED AVS SNAPSHOT
Emergency Department    [address] [place] [id number]    Phone:  [phone]    Fax:  [phone]                                       Saritha Pearson   MRN: [id number]    Department:   Emergency Department   Date of Visit:  4/22/2018           After Visit Summary Signature Page     I have received my discharge instructions, and my questions have been answered. I have discussed any challenges I see with this plan with the nurse or doctor.    ..........................................................................................................................................  Patient/Patient Representative Signature      ..........................................................................................................................................  Patient Representative Print Name and Relationship to Patient    ..................................................               ................................................  Date                                            Time    ..........................................................................................................................................  Reviewed by Signature/Title    ...................................................              ..............................................  Date                                                            Time

## 2018-04-22 NOTE — DISCHARGE INSTRUCTIONS
Discharge Instructions  Alcohol Intoxication    You have been seen today with alcohol intoxication. This means that you have enough alcohol in your system to impair your ability to mentally and physically function. When you are intoxicated, we are not allowed to release you without a sober adult to be with you. You may not drive, operate dangerous equipment, or do anything else dangerous until you are sober.    You may have come to the Emergency Department because of your intoxication, or for another reason, such as because of an injury. No matter what the case is, this visit is a  red flag  regarding alcohol use, and you should consider whether your drinking pattern is a problem for you.     You may be at risk for alcohol-related problems if:      Men: you drink more than 14 drinks per week, or more than 4 drinks per occasion.      Women: you drink more than 7 drinks per week or more than 3 drinks per occasion.      You have black-outs.    You do things you regret while drinking.    You have legal problems because of drinking (DUI).    You have job problems because of drinking (you call in sick to work because of drinking).    CAGE Questions    Have you ever felt you should cut down on your drinking?    Have people annoyed you by criticizing your drinking?    Have you ever felt bad or guilty about your drinking?    Have you ever had a drink first thing in the morning to steady your nerves or get rid of a hangover (eye opener)?    If you answer yes to any of the CAGE questions, you may have a problem with alcohol.      Return to the Emergency Department if:    You become shaky or tremble when you try to stop drinking.      You have a seizure or pass out.      You throw up (vomit) blood. This may be bright red or it may look like black coffee grounds.      You have blood in the stool. This may be bright red or appear as a black, tarry, bad smelling stool.      You become lightheaded or faint.      For further help,  contact:     Your caregiver.      Alcoholics Anonymous (AA).      A drug or alcohol rehabilitation program.      You can get information on alcohol resources and groups by calling the number 211 or [phone] on any phone.       Seek medical care if:    You have persistent vomiting.      You have persistent pain in any part of your body.      You do not feel better after a few days.    If you were given a prescription for medicine here today, be sure to read all of the information (including the package insert) that comes with your prescription.  This will include important information about the medicine, its side effects, and any warnings that you need to know about.  The pharmacist who fills the prescription can provide more information and answer questions you may have about the medicine.  If you have questions or concerns that the pharmacist cannot address, please call or return to the Emergency Department.   Remember that you can always come back to the Emergency Department if you are not able to see your regular doctor in the amount of time listed above, if you get any new symptoms, or if there is anything that worries you.
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
## 2018-05-10 ENCOUNTER — TRANSFERRED RECORDS (OUTPATIENT)
Dept: HEALTH INFORMATION MANAGEMENT | Facility: CLINIC | Age: 53
End: 2018-05-10

## 2018-06-01 ENCOUNTER — TELEPHONE (OUTPATIENT)
Dept: FAMILY MEDICINE | Facility: CLINIC | Age: 53
End: 2018-06-01

## 2018-06-01 NOTE — TELEPHONE ENCOUNTER
I just got report of mammogram dated 5/10/18. It needs additional imaging.    Can you call her to make sure she has seen that.    Lalo Gold M.D.

## 2018-06-01 NOTE — TELEPHONE ENCOUNTER
Please abstract the following data from this visit with this patient into the appropriate field in Epic:    Mammogram done on this date: 05-, by this group: [facility], results were additional imaging needed.     Copy of report was sent to scanning

## 2018-06-04 NOTE — TELEPHONE ENCOUNTER
Called and spoke to patient. She said that she did not know that and has not been contacted.     Called [facility]. They informed me that she had an U/S the same day as the mammogram because she had a lump. The U/S was fine and she can follow up in 1 year.    Called and informed patient of this. She then said that she knew she had the U/S and was told to follow up with you if she thought the lump was getting bigger.Stephany Austin MA/TC

## 2018-07-08 DIAGNOSIS — J01.90 ACUTE SINUSITIS WITH SYMPTOMS > 10 DAYS: ICD-10-CM

## 2018-07-08 NOTE — TELEPHONE ENCOUNTER
"Requested Prescriptions   Pending Prescriptions Disp Refills     fluticasone (FLONASE) 50 MCG/ACT spray   Last Written Prescription Date:  5/3/17  Last Fill Quantity: 1,  # refills: 11   Last office visit: 5/3/2017 with prescribing provider:  GEMINI Lombardi   Future Office Visit:     16 mL 0     Sig: SHAKE LIQUID AND USE 1 TO 2 SPRAYS IN EACH NOSTRIL DAILY    Inhaled Steroids Protocol Failed    7/8/2018 12:57 PM       Failed - Asthma control assessment score within normal limits in last 6 months    Please review ACT score.   No flowsheet data found.         Failed - Recent (6 mo) or future (30 days) visit within the authorizing provider's specialty    Patient had office visit in the last 6 months or has a visit in the next 30 days with authorizing provider or within the authorizing provider's specialty.  See \"Patient Info\" tab in inbasket, or \"Choose Columns\" in Meds & Orders section of the refill encounter.           Passed - Patient is age 12 or older          "

## 2018-07-09 RX ORDER — FLUTICASONE PROPIONATE 50 MCG
SPRAY, SUSPENSION (ML) NASAL
Qty: 16 ML | Refills: 0 | Status: SHIPPED | OUTPATIENT
Start: 2018-07-09 | End: 2018-08-09

## 2018-11-02 ENCOUNTER — OFFICE VISIT (OUTPATIENT)
Dept: FAMILY MEDICINE | Facility: CLINIC | Age: 53
End: 2018-11-02
Payer: COMMERCIAL

## 2018-11-02 VITALS
SYSTOLIC BLOOD PRESSURE: 129 MMHG | BODY MASS INDEX: 30.34 KG/M2 | TEMPERATURE: 97.4 F | OXYGEN SATURATION: 100 % | WEIGHT: 188 LBS | HEART RATE: 77 BPM | DIASTOLIC BLOOD PRESSURE: 82 MMHG

## 2018-11-02 DIAGNOSIS — R30.0 DYSURIA: ICD-10-CM

## 2018-11-02 DIAGNOSIS — J01.01 ACUTE RECURRENT MAXILLARY SINUSITIS: ICD-10-CM

## 2018-11-02 DIAGNOSIS — Z23 NEED FOR VACCINATION: ICD-10-CM

## 2018-11-02 DIAGNOSIS — Z00.00 ENCOUNTER FOR PREVENTIVE HEALTH EXAMINATION: Primary | ICD-10-CM

## 2018-11-02 DIAGNOSIS — B37.31 YEAST VAGINITIS: ICD-10-CM

## 2018-11-02 LAB
ALBUMIN UR-MCNC: NEGATIVE MG/DL
ANION GAP SERPL CALCULATED.3IONS-SCNC: 9 MMOL/L (ref 3–14)
APPEARANCE UR: CLEAR
BILIRUB UR QL STRIP: NEGATIVE
BUN SERPL-MCNC: 6 MG/DL (ref 7–30)
CALCIUM SERPL-MCNC: 9.5 MG/DL (ref 8.5–10.1)
CHLORIDE SERPL-SCNC: 106 MMOL/L (ref 94–109)
CO2 SERPL-SCNC: 26 MMOL/L (ref 20–32)
COLOR UR AUTO: YELLOW
CREAT SERPL-MCNC: 0.66 MG/DL (ref 0.52–1.04)
GFR SERPL CREATININE-BSD FRML MDRD: >90 ML/MIN/1.7M2
GLUCOSE SERPL-MCNC: 98 MG/DL (ref 70–99)
GLUCOSE UR STRIP-MCNC: NEGATIVE MG/DL
HBA1C MFR BLD: 5.6 % (ref 0–5.6)
HGB UR QL STRIP: NEGATIVE
KETONES UR STRIP-MCNC: NEGATIVE MG/DL
LEUKOCYTE ESTERASE UR QL STRIP: NEGATIVE
NITRATE UR QL: NEGATIVE
PH UR STRIP: 5.5 PH (ref 5–7)
POTASSIUM SERPL-SCNC: 3.7 MMOL/L (ref 3.4–5.3)
SODIUM SERPL-SCNC: 141 MMOL/L (ref 133–144)
SOURCE: NORMAL
SP GR UR STRIP: >1.03 (ref 1–1.03)
UROBILINOGEN UR STRIP-ACNC: 0.2 EU/DL (ref 0.2–1)

## 2018-11-02 PROCEDURE — 90715 TDAP VACCINE 7 YRS/> IM: CPT | Performed by: FAMILY MEDICINE

## 2018-11-02 PROCEDURE — 36415 COLL VENOUS BLD VENIPUNCTURE: CPT | Performed by: FAMILY MEDICINE

## 2018-11-02 PROCEDURE — G0145 SCR C/V CYTO,THINLAYER,RESCR: HCPCS | Performed by: FAMILY MEDICINE

## 2018-11-02 PROCEDURE — 81003 URINALYSIS AUTO W/O SCOPE: CPT | Performed by: FAMILY MEDICINE

## 2018-11-02 PROCEDURE — 87491 CHLMYD TRACH DNA AMP PROBE: CPT | Performed by: FAMILY MEDICINE

## 2018-11-02 PROCEDURE — 80048 BASIC METABOLIC PNL TOTAL CA: CPT | Performed by: FAMILY MEDICINE

## 2018-11-02 PROCEDURE — 87389 HIV-1 AG W/HIV-1&-2 AB AG IA: CPT | Performed by: FAMILY MEDICINE

## 2018-11-02 PROCEDURE — 87591 N.GONORRHOEAE DNA AMP PROB: CPT | Performed by: FAMILY MEDICINE

## 2018-11-02 PROCEDURE — 83036 HEMOGLOBIN GLYCOSYLATED A1C: CPT | Performed by: FAMILY MEDICINE

## 2018-11-02 PROCEDURE — G0476 HPV COMBO ASSAY CA SCREEN: HCPCS | Performed by: FAMILY MEDICINE

## 2018-11-02 PROCEDURE — 99396 PREV VISIT EST AGE 40-64: CPT | Mod: 25 | Performed by: FAMILY MEDICINE

## 2018-11-02 PROCEDURE — 90471 IMMUNIZATION ADMIN: CPT | Performed by: FAMILY MEDICINE

## 2018-11-02 RX ORDER — FLUCONAZOLE 150 MG/1
TABLET ORAL
Qty: 2 TABLET | Refills: 0 | Status: SHIPPED | OUTPATIENT
Start: 2018-11-02 | End: 2019-01-16

## 2018-11-02 ASSESSMENT — ENCOUNTER SYMPTOMS
HEMATOCHEZIA: 0
HEARTBURN: 0
PALPITATIONS: 0
SORE THROAT: 1
CONSTIPATION: 1
HEADACHES: 0
MYALGIAS: 0
ARTHRALGIAS: 0
ABDOMINAL PAIN: 0
SHORTNESS OF BREATH: 0
COUGH: 1
HEMATURIA: 0
DYSURIA: 1
NERVOUS/ANXIOUS: 0
FEVER: 0
FREQUENCY: 1
NAUSEA: 0
DIZZINESS: 0
CHILLS: 0
WEAKNESS: 0
JOINT SWELLING: 0
PARESTHESIAS: 0
BREAST MASS: 0
EYE PAIN: 1
DIARRHEA: 1

## 2018-11-02 NOTE — NURSING NOTE
"Chief Complaint   Patient presents with     Physical       Initial /82 (Cuff Size: Adult Large)  Pulse 77  Temp 97.4  F (36.3  C) (Oral)  Wt 188 lb (85.3 kg)  SpO2 100%  Breastfeeding? No  BMI 30.34 kg/m2 Estimated body mass index is 30.34 kg/(m^2) as calculated from the following:    Height as of 8/8/17: 5' 6\" (1.676 m).    Weight as of this encounter: 188 lb (85.3 kg).      Keir Medina LPN    "

## 2018-11-02 NOTE — LETTER
November 6, 2018    Saritha Jacoboham  [address]   [facility] [address]        Faviola Mercado,    All your results are normal. But the PAP is still pending and you will be contacted separately about that.    Regards,    Lalo Gold M.D.    Results for orders placed or performed in visit on 11/02/18   Basic metabolic panel   Result Value Ref Range    Sodium 141 133 - 144 mmol/L    Potassium 3.7 3.4 - 5.3 mmol/L    Chloride 106 94 - 109 mmol/L    Carbon Dioxide 26 20 - 32 mmol/L    Anion Gap 9 3 - 14 mmol/L    Glucose 98 70 - 99 mg/dL    Urea Nitrogen 6 (L) 7 - 30 mg/dL    Creatinine 0.66 0.52 - 1.04 mg/dL    GFR Estimate >90 >60 mL/min/1.7m2    GFR Estimate If Black >90 >60 mL/min/1.7m2    Calcium 9.5 8.5 - 10.1 mg/dL   Hemoglobin A1c   Result Value Ref Range    Hemoglobin A1C 5.6 0 - 5.6 %   HIV Antigen Antibody Combo   Result Value Ref Range    HIV Antigen Antibody Combo Nonreactive NR^Nonreactive       UA reflex to Microscopic and Culture   Result Value Ref Range    Color Urine Yellow     Appearance Urine Clear     Glucose Urine Negative NEG^Negative mg/dL    Bilirubin Urine Negative NEG^Negative    Ketones Urine Negative NEG^Negative mg/dL    Specific Gravity Urine >1.030 1.003 - 1.035    Blood Urine Negative NEG^Negative    pH Urine 5.5 5.0 - 7.0 pH    Protein Albumin Urine Negative NEG^Negative mg/dL    Urobilinogen Urine 0.2 0.2 - 1.0 EU/dL    Nitrite Urine Negative NEG^Negative    Leukocyte Esterase Urine Negative NEG^Negative    Source Midstream Urine    Chlamydia trachomatis PCR   Result Value Ref Range    Specimen Description Endocervical     Chlamydia Trachomatis PCR Negative NEG^Negative   Neisseria gonorrhoeae PCR   Result Value Ref Range    Specimen Descrip Endocervical     N Gonorrhea PCR Negative NEG^Negative

## 2018-11-02 NOTE — PROGRESS NOTES
SUBJECTIVE:   CC: Saritha Pearson is an 53 year old woman who presents for preventive health visit.     Physical   Annual:     Getting at least 3 servings of Calcium per day:  Yes    Bi-annual eye exam:  Yes    Dental care twice a year:  Yes    Sleep apnea or symptoms of sleep apnea:  None    Diet:  Regular (no restrictions)    Frequency of exercise:  2-3 days/week    Duration of exercise:  45-60 minutes    Taking medications regularly:  Yes    Medication side effects:  None    Additional concerns today:  No      Left maxillary sinusitis - has had left sided pressure and nasal congestion for 2 weeks. No fevers.   Evaluation and treatment:    Augmentin has worked well for her in the past. I rx'd that.     Yeast vaginitis - she has had some vaginal discharge, itch. Some dysuria without frequency or hematuria.  Evaluation and treatment:    Urinalysis normal today.   Clinically she has yeast infection - I rx'd Diflucan.    Constipation - follows with GI. Miralax daily works. Symptoms come back if she misses doses.    CT ABDOMEN AND PELVIS WITH CONTRAST 1/27/2016 2:50 PM        IMPRESSION:  1. Fluid distention of the colon without obvious obstructing mass or  debris. Oral contrast reaches the right colon and the small bowel is  nondistended. No evidence of obstruction or diverticulitis. Appendix  is normal.  2. Evidence of old granulomatous disease.    Overweight - Diet and exercise discussed.    Wt Readings from Last 5 Encounters:   11/02/18 188 lb (85.3 kg)   08/08/17 184 lb (83.5 kg)   05/05/17 170 lb (77.1 kg)   05/03/17 186 lb 3.2 oz (84.5 kg)   03/24/17 185 lb 12.8 oz (84.3 kg)     Body mass index is 30.34 kg/(m^2).    Surgical history:   Multiple cosmetic surgeries   Hysterectomy - supracervical?   Right knee arthroscopic surgery    Preventive:  Tdap given in clinic today. She declines flu shot. She declines Shingles vaccine.    Immunization History   Administered Date(s) Administered     TDAP Vaccine (Adacel)  11/02/2018     Tdap (Adacel,Boostrix) 12/03/2008       Lipids screen:    Recent Labs   Lab Test  07/27/17   1432  04/26/16   1157   CHOL  168  147   HDL  65  64   LDL  87  66   TRIG  81  85       STD screen: negative HIV, syphilis, GC/chlamydia 7/27/17 - she wanted repeat testing which I ordered.    Anemia screen:     CBC RESULTS:   Recent Labs   Lab Test  04/22/18   0451   WBC  6.7   RBC  4.68   HGB  12.5   HCT  37.7   MCV  81   MCH  26.7   MCHC  33.2   RDW  13.2   PLT  330     Diabetes screen:     Last Comprehensive Metabolic Panel:  Sodium   Date Value Ref Range Status   11/02/2018 141 133 - 144 mmol/L Final     Potassium   Date Value Ref Range Status   11/02/2018 3.7 3.4 - 5.3 mmol/L Final     Chloride   Date Value Ref Range Status   11/02/2018 106 94 - 109 mmol/L Final     Carbon Dioxide   Date Value Ref Range Status   11/02/2018 26 20 - 32 mmol/L Final     Anion Gap   Date Value Ref Range Status   11/02/2018 9 3 - 14 mmol/L Final     Glucose   Date Value Ref Range Status   11/02/2018 98 70 - 99 mg/dL Final     Comment:     Non Fasting     Urea Nitrogen   Date Value Ref Range Status   11/02/2018 6 (L) 7 - 30 mg/dL Final     Creatinine   Date Value Ref Range Status   11/02/2018 0.66 0.52 - 1.04 mg/dL Final     GFR Estimate   Date Value Ref Range Status   11/02/2018 >90 >60 mL/min/1.7m2 Final     Comment:     Non  GFR Calc     Calcium   Date Value Ref Range Status   11/02/2018 9.5 8.5 - 10.1 mg/dL Final     Mammogram: May 2018 - was told it was ok after doing additional imaging.    Contraception: hysterectomy, supracervical? Collected again - the cervix was difficult to find and seemed anatomically altered.    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  Exercise: treadmill 3 times per week  Tobacco: no  Etoh: 1-2 per week  Recreational drugs: no  Caffeine: coffee 1 per  week    Today's PHQ-2 Score:   PHQ-2 ( 1999 Pfizer) 11/2/2018   Q1: Little interest or pleasure in doing things 0   Q2: Feeling down, depressed or hopeless 0   PHQ-2 Score 0   Q1: Little interest or pleasure in doing things Not at all   Q2: Feeling down, depressed or hopeless Not at all   PHQ-2 Score 0           Social History   Substance Use Topics     Smoking status: Never Smoker     Smokeless tobacco: Never Used     Alcohol use Yes      Comment: occasionally     Alcohol Use 11/2/2018   If you drink alcohol do you typically have greater than 3 drinks per day OR greater than 7 drinks per week? No   No flowsheet data found.    Reviewed orders with patient.  Reviewed health maintenance and updated orders accordingly - Yes    Reviewed and updated as needed this visit by clinical staff  Tobacco  Allergies  Meds  Med Hx  Surg Hx  Fam Hx  Soc Hx        Reviewed and updated as needed this visit by Provider            Review of Systems   Constitutional: Negative for chills and fever.   HENT: Positive for congestion, ear pain and sore throat. Negative for hearing loss.    Eyes: Positive for pain. Negative for visual disturbance.   Respiratory: Positive for cough. Negative for shortness of breath.    Cardiovascular: Positive for chest pain. Negative for palpitations and peripheral edema.   Gastrointestinal: Positive for constipation and diarrhea. Negative for abdominal pain, heartburn, hematochezia and nausea.   Breasts:  Negative for tenderness, breast mass and discharge.   Genitourinary: Positive for dysuria, frequency and urgency. Negative for genital sores, hematuria, pelvic pain, vaginal bleeding and vaginal discharge.   Musculoskeletal: Negative for arthralgias, joint swelling and myalgias.   Skin: Negative for rash.   Neurological: Negative for dizziness, weakness, headaches and paresthesias.   Psychiatric/Behavioral: Negative for mood changes. The patient is not nervous/anxious.      CONSTITUTIONAL: NEGATIVE  for fever, chills, change in weight  INTEGUMENTARY/SKIN: NEGATIVE for worrisome rashes, moles or lesions  EYES: NEGATIVE for vision changes or irritation  ENT: NEGATIVE for ear, mouth and throat problems  RESP: NEGATIVE for significant cough or SOB  BREAST: NEGATIVE for masses, tenderness or discharge  CV: NEGATIVE for chest pain, palpitations or peripheral edema  GI: NEGATIVE for nausea, abdominal pain, heartburn, or change in bowel habits  : NEGATIVE for unusual urinary or vaginal symptoms. No vaginal bleeding.  MUSCULOSKELETAL: NEGATIVE for significant arthralgias or myalgia  NEURO: NEGATIVE for weakness, dizziness or paresthesias  PSYCHIATRIC: NEGATIVE for changes in mood or affect      OBJECTIVE:   /82 (Cuff Size: Adult Large)  Pulse 77  Temp 97.4  F (36.3  C) (Oral)  Wt 188 lb (85.3 kg)  SpO2 100%  Breastfeeding? No  BMI 30.34 kg/m2  Physical Exam  GENERAL APPEARANCE: healthy, alert and no distress  EYES: Eyes grossly normal to inspection, PERRL and conjunctivae and sclerae normal  HENT: ear canals and TM's normal, nose and mouth without ulcers or lesions, oropharynx clear and oral mucous membranes moist  NECK: no adenopathy, no asymmetry, masses, or scars and thyroid normal to palpation  RESP: lungs clear to auscultation - no rales, rhonchi or wheezes  CV: regular rate and rhythm, normal S1 S2, no S3 or S4, no murmur, click or rub, no peripheral edema and peripheral pulses strong  ABDOMEN: soft, nontender, no hepatosplenomegaly, no masses and bowel sounds normal  MS: no musculoskeletal defects are noted and gait is age appropriate without ataxia  SKIN: no suspicious lesions or rashes  NEURO: Normal strength and tone, sensory exam grossly normal, mentation intact and speech normal  PSYCH: mentation appears normal and affect normal/bright    The following exams were done in the presence of Keri Medina LPN.  BREAST: normal without masses, tenderness or nipple discharge and no palpable axillary  "masses or adenopathy  : normal female external genitalia, vaginal mucosa pink. Cheesy white discharge. The cervix was difficult to finds and seemed altered anatomically (could not tell if her hysterectomy was supracervical or not).         ASSESSMENT/PLAN:       COUNSELING:  Reviewed preventive health counseling, as reflected in patient instructions       Regular exercise       Healthy diet/nutrition    BP Readings from Last 1 Encounters:   11/02/18 129/82     Estimated body mass index is 30.34 kg/(m^2) as calculated from the following:    Height as of 8/8/17: 5' 6\" (1.676 m).    Weight as of this encounter: 188 lb (85.3 kg).      Weight management plan: Discussed healthy diet and exercise guidelines and patient will follow up in 12 months in clinic to re-evaluate.     reports that she has never smoked. She has never used smokeless tobacco.    Assessment and Plan - Decision Making    1. Encounter for preventive health examination    Results of today's exam given to patient verbally along with age appropriate preventive measures. Written instructions reviewed and handed to the patient.    - Basic metabolic panel  - Hemoglobin A1c  - Pap imaged thin layer screen with HPV - recommended age 30 - 65 years (select HPV order below)  - HPV High Risk Types DNA Cervical  - Chlamydia trachomatis PCR  - HIV Antigen Antibody Combo  - Neisseria gonorrhoeae PCR    2. Acute recurrent maxillary sinusitis      Per HPI    - amoxicillin-clavulanate (AUGMENTIN) 875-125 MG per tablet; Take 1 tablet by mouth 2 times daily for 7 days  Dispense: 14 tablet; Refill: 0    3. Dysuria    Per HPI    - UA reflex to Microscopic and Culture    4. Yeast vaginitis    Per HPI    - fluconazole (DIFLUCAN) 150 MG tablet; Take one now and repeat in one week.  Dispense: 2 tablet; Refill: 0    5. Need for vaccination    - 1st  Administration  [64844]  - TDAP VACCINE (ADACEL) [92520.002]      Written instructions given as follows:    Patient Instructions "     1. I recommend including veggies, fresh fruits (3 to 5 servings), nuts (unsalted) in your daily diet. Cut down on carbs like bread, pasta, rice, potatoes. Cut down on red meats like burgers etc. Increase healthy proteins like beans, tofu, tuna, chicken and turkey.    2. Get regular exercise like jogging, biking, swimming at least 3 times per week.      3. Do self breast exams monthly.     4. See the dentist at least once per year. Eye doctor every 2 years.    5. I sent the Augmentin to your pharmacy.    6. I will contact you via My Chart about your results.    7. If all is well, see you back in one year.                  Screening Questionnaire for Adult Immunization    Are you sick today?   No   Do you have allergies to medications, food, a vaccine component or latex?   Yes   Have you ever had a serious reaction after receiving a vaccination?   No   Do you have a long-term health problem with heart disease, lung disease, asthma, kidney disease, metabolic disease (e.g. diabetes), anemia, or other blood disorder?   No   Do you have cancer, leukemia, HIV/AIDS, or any other immune system problem?   No   In the past 3 months, have you taken medications that affect  your immune system, such as prednisone, other steroids, or anticancer drugs; drugs for the treatment of rheumatoid arthritis, Crohn s disease, or psoriasis; or have you had radiation treatments?   No   Have you had a seizure, or a brain or other nervous system problem?   No   During the past year, have you received a transfusion of blood or blood     products, or been given immune (gamma) globulin or antiviral drug?   No   For women: Are you pregnant or is there a chance you could become        pregnant during the next month?   No   Have you received any vaccinations in the past 4 weeks?   No     Immunization questionnaire was positive for at least one answer.  Notified Dr. Gold.      Screening performed by Keri Medina on 11/2/2018 at 12:39 PM.

## 2018-11-02 NOTE — LETTER
November 21, 2018      Saritha Michel  [address]   [facility] [address]    Dear ,      This letter is in regards to the PAP smear and HPV (Human Papillomavirus) test you had done recently. Your PAP test result is normal, but your HPV (Human Papillomavirus) test was positive.     About 80 percent of women have been exposed to HPV virus throughout their lifetime. There is no medication for the treatment of HPV. Typically your own immune system gets rid of the virus before it does harm. HPV is spread by direct skin-to-skin contact, including sexual intercourse, oral sex, anal sex, or any other contact involving the genital area (example: hand to genital contact). It is not possible to become infected with HPV by touching an object, such as a toilet seat. Most people who are infected with HPV have no signs or symptoms.    Things that you can do to boost your immune system and help your body get rid of HPV: get plenty of rest, eat a well-balanced diet of healthy foods, stop smoking, exercise regularly and decrease stress.    If you have additional questions regarding this result, please call our registered nurse, Zahida at [phone].    Sincerely,      BENNETT POLLARD MD/glendy

## 2018-11-02 NOTE — MR AVS SNAPSHOT
After Visit Summary   11/2/2018    Saritha Pearson    MRN: [id number]           Patient Information     Date Of Birth          1965        Visit Information        Provider Department      11/2/2018 11:30 AM Lalo Gold MD [facility]        Today's Diagnoses     Encounter for preventive health examination    -  1    Acute recurrent maxillary sinusitis        Dysuria          Care Instructions      1. I recommend including veggies, fresh fruits (3 to 5 servings), nuts (unsalted) in your daily diet. Cut down on carbs like bread, pasta, rice, potatoes. Cut down on red meats like burgers etc. Increase healthy proteins like beans, tofu, tuna, chicken and turkey.    2. Get regular exercise like jogging, biking, swimming at least 3 times per week.      3. Do self breast exams monthly.     4. See the dentist at least once per year. Eye doctor every 2 years.    5. I sent the Augmentin to your pharmacy.    6. I will contact you via My Chart about your results.    7. If all is well, see you back in one year.                      Follow-ups after your visit        Who to contact     If you have questions or need follow up information about today's clinic visit or your schedule please contact [facility] directly at [phone].  Normal or non-critical lab and imaging results will be communicated to you by [employer], letter or phone within 4 business days after the clinic has received the results. If you do not hear from us within 7 days, please contact the clinic through [email or web address] or phone. If you have a critical or abnormal lab result, we will notify you by phone as soon as possible.  Submit refill requests through [employer] or call your pharmacy and they will forward the refill request to us. Please allow 3 business days for your refill to be completed.          Additional Information About Your Visit        [employer] Information     [employer] lets you send messages to your doctor, view  "your test results, renew your prescriptions, schedule appointments and more. To sign up, go to [email or web address] . Click on \"Log in\" on the left side of the screen, which will take you to the Welcome page. Then click on \"Sign up Now\" on the right side of the page.     You will be asked to enter the access code listed below, as well as some personal information. Please follow the directions to create your username and password.     Your access code is: [email or web address]  Expires: 2019 12:18 PM     Your access code will  in 90 days. If you need help or a new code, please call your [facility] clinic or [phone].        Care EveryWhere ID     This is your Care EveryWhere ID. This could be used by other organizations to access your [facility] medical records  [id number]        Your Vitals Were     Pulse Temperature Pulse Oximetry Breastfeeding? BMI (Body Mass Index)       77 97.4  F (36.3  C) (Oral) 100% No 30.34 kg/m2        Blood Pressure from Last 3 Encounters:   18 129/82   18 124/75   17 108/74    Weight from Last 3 Encounters:   18 188 lb (85.3 kg)   17 184 lb (83.5 kg)   17 170 lb (77.1 kg)              We Performed the Following     Basic metabolic panel     Chlamydia trachomatis PCR     Hemoglobin A1c     HIV Antigen Antibody Combo     HPV High Risk Types DNA Cervical     Neisseria gonorrhoeae PCR     Pap imaged thin layer screen with HPV - recommended age 30 - 65 years (select HPV order below)     UA reflex to Microscopic and Culture          Today's Medication Changes          These changes are accurate as of 18 12:18 PM.  If you have any questions, ask your nurse or doctor.               Start taking these medicines.        Dose/Directions    amoxicillin-clavulanate 875-125 MG per tablet   Commonly known as:  AUGMENTIN   Used for:  Acute recurrent maxillary sinusitis   Started by:  Lalo Gold MD        Dose:  1 tablet   Take 1 tablet by mouth 2 " times daily for 7 days   Quantity:  14 tablet   Refills:  0            Where to get your medicines      These [facility] - [place], [place] - [address] AT [address] [place] [address] & [facility]  [address] [place] [address] [address]    Hours:  24-hours Phone:  [phone]     amoxicillin-clavulanate 875-125 MG per tablet                Primary Care Provider Office Phone # Fax #    Lalo Gold [phone] [address] [facility] [address]        Equal Access to Services     [facility]: Hadii aad ku hadasho Soomaali, waaxda luqadaha, qaybta kaalmada adeegyada, ray gamez haymalou cisse . So [facility] [phone].    ATENCIÓN: Si habla español, tiene a thompson disposición servicios gratuitos de asistencia lingüística. [facility] [phone].    We comply with applicable federal civil rights laws and [place] laws. We do not discriminate on the basis of race, color, national origin, age, disability, sex, sexual orientation, or gender identity.            Thank you!     Thank you for choosing [facility]  for your care. Our goal is always to provide you with excellent care. Hearing back from our patients is one way we can continue to improve our services. Please take a few minutes to complete the written survey that you may receive in the mail after your visit with us. Thank you!             Your Updated Medication List - Protect others around you: Learn how to safely use, store and throw away your medicines at www.disposemymeds.org.          This list is accurate as of 11/2/18 12:18 PM.  Always use your most recent med list.                   Brand Name Dispense Instructions for use Diagnosis    albuterol 108 (90 Base) MCG/ACT inhaler    PROAIR HFA/PROVENTIL HFA/VENTOLIN HFA    1 Inhaler    Inhale 2 puffs into the lungs every 4 hours as needed for shortness of breath / dyspnea or wheezing    Acute bronchitis, unspecified organism        amoxicillin-clavulanate 875-125 MG per tablet    AUGMENTIN    14 tablet    Take 1 tablet by mouth 2 times daily for 7 days    Acute recurrent maxillary sinusitis       bisacodyl 10 MG Suppository    DULCOLAX    25 suppository    Place 10 mg rectally daily as needed for constipation Reported on 3/24/2017        fluticasone 50 MCG/ACT spray    FLONASE    9.9 mL    SHAKE LIQUID AND USE 1 TO 2 SPRAYS IN EACH NOSTRIL DAILY    Acute sinusitis with symptoms > 10 days       hydrocortisone 25 MG Suppository    ANUSOL-HC    28 suppository    Place 1 suppository (25 mg) rectally 2 times daily    Bleeding internal hemorrhoids       MIRALAX powder   Generic drug:  polyethylene glycol     510 g    Take 1 capful by mouth daily Reported on 3/24/2017    Constipation, unspecified constipation type

## 2018-11-02 NOTE — PATIENT INSTRUCTIONS
1. I recommend including veggies, fresh fruits (3 to 5 servings), nuts (unsalted) in your daily diet. Cut down on carbs like bread, pasta, rice, potatoes. Cut down on red meats like burgers etc. Increase healthy proteins like beans, tofu, tuna, chicken and turkey.    2. Get regular exercise like jogging, biking, swimming at least 3 times per week.      3. Do self breast exams monthly.     4. See the dentist at least once per year. Eye doctor every 2 years.    5. I sent the Augmentin to your pharmacy.    6. I will contact you via My Chart about your results.    7. If all is well, see you back in one year.

## 2018-11-04 LAB
C TRACH DNA SPEC QL NAA+PROBE: NEGATIVE
N GONORRHOEA DNA SPEC QL NAA+PROBE: NEGATIVE
SPECIMEN SOURCE: NORMAL
SPECIMEN SOURCE: NORMAL

## 2018-11-05 LAB — HIV 1+2 AB+HIV1 P24 AG SERPL QL IA: NONREACTIVE

## 2018-11-06 LAB
COPATH REPORT: NORMAL
PAP: NORMAL

## 2018-11-06 NOTE — PROGRESS NOTES
Please mail letter:    Faviola Mercado,    All your results are normal. But the PAP is still pending and you will be contacted separately about that.    Regards,    Lalo Gold M.D.

## 2018-11-08 LAB
FINAL DIAGNOSIS: ABNORMAL
HPV HR 12 DNA CVX QL NAA+PROBE: POSITIVE
HPV16 DNA SPEC QL NAA+PROBE: NEGATIVE
HPV18 DNA SPEC QL NAA+PROBE: NEGATIVE
SPECIMEN DESCRIPTION: ABNORMAL
SPECIMEN SOURCE CVX/VAG CYTO: ABNORMAL

## 2018-11-09 PROBLEM — R87.810 CERVICAL HIGH RISK HPV (HUMAN PAPILLOMAVIRUS) TEST POSITIVE: Status: ACTIVE | Noted: 2018-11-02

## 2019-01-16 ENCOUNTER — OFFICE VISIT (OUTPATIENT)
Dept: URGENT CARE | Facility: URGENT CARE | Age: 54
End: 2019-01-16
Payer: COMMERCIAL

## 2019-01-16 VITALS
BODY MASS INDEX: 29.86 KG/M2 | RESPIRATION RATE: 16 BRPM | TEMPERATURE: 97.3 F | SYSTOLIC BLOOD PRESSURE: 155 MMHG | WEIGHT: 185 LBS | HEART RATE: 75 BPM | DIASTOLIC BLOOD PRESSURE: 89 MMHG

## 2019-01-16 DIAGNOSIS — N30.90 CYSTITIS: Primary | ICD-10-CM

## 2019-01-16 DIAGNOSIS — R03.0 ELEVATED BLOOD PRESSURE READING WITHOUT DIAGNOSIS OF HYPERTENSION: ICD-10-CM

## 2019-01-16 LAB
ALBUMIN UR-MCNC: NEGATIVE MG/DL
APPEARANCE UR: NORMAL
BILIRUB UR QL STRIP: NEGATIVE
CAOX CRY #/AREA URNS HPF: ABNORMAL /HPF
COLOR UR AUTO: YELLOW
GLUCOSE UR STRIP-MCNC: NEGATIVE MG/DL
HGB UR QL STRIP: NEGATIVE
KETONES UR STRIP-MCNC: NEGATIVE MG/DL
LEUKOCYTE ESTERASE UR QL STRIP: NEGATIVE
MUCOUS THREADS #/AREA URNS LPF: PRESENT /LPF
NITRATE UR QL: NEGATIVE
NON-SQ EPI CELLS #/AREA URNS LPF: ABNORMAL /LPF
PH UR STRIP: 5.5 PH (ref 5–7)
RBC #/AREA URNS AUTO: ABNORMAL /HPF
SOURCE: NORMAL
SP GR UR STRIP: >1.03 (ref 1–1.03)
UROBILINOGEN UR STRIP-ACNC: 0.2 EU/DL (ref 0.2–1)
WBC #/AREA URNS AUTO: ABNORMAL /HPF

## 2019-01-16 PROCEDURE — 99214 OFFICE O/P EST MOD 30 MIN: CPT | Performed by: FAMILY MEDICINE

## 2019-01-16 PROCEDURE — 87086 URINE CULTURE/COLONY COUNT: CPT | Performed by: FAMILY MEDICINE

## 2019-01-16 PROCEDURE — 81001 URINALYSIS AUTO W/SCOPE: CPT | Performed by: FAMILY MEDICINE

## 2019-01-16 RX ORDER — NITROFURANTOIN 25; 75 MG/1; MG/1
100 CAPSULE ORAL 2 TIMES DAILY
Qty: 14 CAPSULE | Refills: 0 | Status: SHIPPED | OUTPATIENT
Start: 2019-01-16 | End: 2019-01-23

## 2019-01-16 RX ORDER — PHENAZOPYRIDINE HYDROCHLORIDE 200 MG/1
200 TABLET, FILM COATED ORAL 3 TIMES DAILY PRN
Qty: 6 TABLET | Refills: 0 | Status: SHIPPED | OUTPATIENT
Start: 2019-01-16 | End: 2019-01-19

## 2019-01-16 ASSESSMENT — PAIN SCALES - GENERAL: PAINLEVEL: MODERATE PAIN (5)

## 2019-01-16 NOTE — LETTER
January 18, 2019    Saritha Pearson  [address]   [facility] [id number]            Dear Saritha,    Your urine did not grow any infection. If her symptoms persist please follow up with primary care provider. If severe please go to ER especially with any fever vomiting back pain.       Below is a copy of the results.  It was a pleasure to see you at your last appointment.    If you have any questions or concerns, please call myself or my nurse at [phone].    Sincerely,    Catherine Busch MD / rene    Results for orders placed or performed in visit on 01/16/19   *UA reflex to Microscopic and Culture ([facility] (except [place] and [place])   Result Value Ref Range    Color Urine Yellow     Appearance Urine Slightly Cloudy     Glucose Urine Negative NEG^Negative mg/dL    Bilirubin Urine Negative NEG^Negative    Ketones Urine Negative NEG^Negative mg/dL    Specific Gravity Urine >1.030 1.003 - 1.035    Blood Urine Negative NEG^Negative    pH Urine 5.5 5.0 - 7.0 pH    Protein Albumin Urine Negative NEG^Negative mg/dL    Urobilinogen Urine 0.2 0.2 - 1.0 EU/dL    Nitrite Urine Negative NEG^Negative    Leukocyte Esterase Urine Negative NEG^Negative    Source Midstream Urine    Urine Microscopic   Result Value Ref Range    WBC Urine 0 - 5 OTO5^0 - 5 /HPF    RBC Urine O - 2 OTO2^O - 2 /HPF    Squamous Epithelial /LPF Urine Few FEW^Few /LPF    Calcium Oxalate Moderate (A) NEG^Negative /HPF    Mucous Urine Present (A) NEG^Negative /LPF   Urine Culture Aerobic Bacterial   Result Value Ref Range    Specimen Description Midstream Urine     Culture Micro No growth

## 2019-01-17 LAB
BACTERIA SPEC CULT: NO GROWTH
SPECIMEN SOURCE: NORMAL

## 2019-01-17 NOTE — PROGRESS NOTES
Chief complaint: uti  Dysuria/ugency/frequency 1 days  Vaginal discharge or concerns: No  Fever chills: None  Nausea vomiting: None  Flank Pain: None  Patient denies possibility of pregnancy  Denies possibility of STD, declined STD testing.    BP elevated today but asymptomatic. No headache no blurring of vision no chest pain no shortness of breath no dizziness no unsteadiness no numbness no weakness    Patient requesting ciprofloxacin - it's the only thing that works for her per patient     Problem list and histories reviewed & adjusted, as indicated.  Additional history: as documented    Problem list, Medication list, Allergies, and Medical/Social/Surgical histories reviewed in [facility] and updated as appropriate.    ROS:  Constitutional, HEENT, cardiovascular, pulmonary, gi and gu systems are negative, except as otherwise noted.    OBJECTIVE:                                                    /89   Pulse 75   Temp 97.3  F (36.3  C) (Oral)   Resp 16   Wt 83.9 kg (185 lb)   Breastfeeding? No   BMI 29.86 kg/m    Body mass index is 29.86 kg/m .  GENERAL: healthy, alert and no distress  NECK: no adenopathy, no asymmetry, masses, or scars and thyroid normal to palpation  RESP: lungs clear to auscultation - no rales, rhonchi or wheezes  CV: regular rate and rhythm, normal S1 S2, no S3 or S4, no murmur, click or rub, no peripheral edema and peripheral pulses strong  ABDOMEN: soft, nontender, no hepatosplenomegaly, no masses and bowel sounds normal  MS: no gross musculoskeletal defects noted, no edema    Diagnostic Test Results:  Diagnostic Test Results:  Results for orders placed or performed in visit on 01/16/19 (from the past 24 hour(s))   *UA reflex to Microscopic and Culture ([place] [facility] (except [place] and [facility])   Result Value Ref Range    Color Urine Yellow     Appearance Urine Slightly Cloudy     Glucose Urine Negative NEG^Negative mg/dL    Bilirubin Urine Negative NEG^Negative     "Ketones Urine Negative NEG^Negative mg/dL    Specific Gravity Urine >1.030 1.003 - 1.035    Blood Urine Negative NEG^Negative    pH Urine 5.5 5.0 - 7.0 pH    Protein Albumin Urine Negative NEG^Negative mg/dL    Urobilinogen Urine 0.2 0.2 - 1.0 EU/dL    Nitrite Urine Negative NEG^Negative    Leukocyte Esterase Urine Negative NEG^Negative    Source Midstream Urine    Urine Microscopic   Result Value Ref Range    WBC Urine 0 - 5 OTO5^0 - 5 /HPF    RBC Urine O - 2 OTO2^O - 2 /HPF    Squamous Epithelial /LPF Urine Few FEW^Few /LPF    Calcium Oxalate Moderate (A) NEG^Negative /HPF    Mucous Urine Present (A) NEG^Negative /LPF          ASSESSMENT/PLAN:                                                        ICD-10-CM    1. Cystitis N30.90 *UA reflex to Microscopic and Culture ([place] [facility] (except [place] and [place])     Urine Microscopic     Urine Culture Aerobic Bacterial     nitroFURantoin macrocrystal-monohydrate (MACROBID) 100 MG capsule     phenazopyridine (PYRIDIUM) 200 MG tablet   2. Elevated blood pressure reading without diagnosis of hypertension R03.0        Prescribed with macrobid and pyridium  Urinalysis is normal except for some calcium oxalate   However given symptoms are very classic for uti I think it is prudent to empircially treatment   Increased fluids recommended  Alarm signs or symptoms discussed, if present recommend go to ER     Patient adamantly requesting ciprofloxacin \"this is the only thing that works for me\"  Given the fact that urinalysis is normal - and also the risk of tendon rupture - I do not think we should treat with ciprofloxacin at this time but may try macrobid.  Ciprofloxacin is no longer considered first line for a simple cystitis because of concerns about side effects.   She is very unhappy with this as she is worried this will get worse however reassured her that we are sending this for culture  She states she may go to a different clinic to get a second " opinion.     Aware to go to ER or come in immediately if with any fever chills nausea vomiting or flank pain.  Adverse reactions of medications discussed.  Over the counter medications discussed.   Aware to come back in if with worsening symptoms or if no relief despite treatment plan  Patient voiced understanding and had no further questions.     Your blood pressure reading was elevated today.  Recommend that you have it re-checked either at home or at our clinic within a week  Avoid cold medicines that have pseudoephedrin in it, or Sudafed. You may take regular or plain Robitussin or Mucinex  If you are unsure, please ask the pharmacist    If your blood pressure top number (systolic) 180 and above, or the bottom number (diastolic) 120 and above, you need to be seen immediately.  If persistently elevated top number 140 and above, or the bottom number 90 and above, please schedule an appointment to see a provider in clinic within a week.  If you have very elevated blood pressures, accompanied by headache, chest pain, numbness, weakness, slurring of speech, confusion, difficulty walking, call 911 and go to the ER      MD Catherine Lemons MD  [facility]

## 2019-01-17 NOTE — NURSING NOTE
"Chief Complaint   Patient presents with     Urinary Problem     pt c/o frequency and pain x 1 day       Initial /89   Pulse 75   Temp 97.3  F (36.3  C) (Oral)   Resp 16   Wt 83.9 kg (185 lb)   Breastfeeding? No   BMI 29.86 kg/m   Estimated body mass index is 29.86 kg/m  as calculated from the following:    Height as of 8/8/17: 1.676 m (5' 6\").    Weight as of this encounter: 83.9 kg (185 lb).  Medication Reconciliation: complete  Stanley Potter MA      "

## 2019-09-17 ENCOUNTER — TRANSFERRED RECORDS (OUTPATIENT)
Dept: HEALTH INFORMATION MANAGEMENT | Facility: CLINIC | Age: 54
End: 2019-09-17

## 2019-09-25 ENCOUNTER — TRANSFERRED RECORDS (OUTPATIENT)
Dept: HEALTH INFORMATION MANAGEMENT | Facility: CLINIC | Age: 54
End: 2019-09-25

## 2019-09-26 ENCOUNTER — TRANSFERRED RECORDS (OUTPATIENT)
Dept: HEALTH INFORMATION MANAGEMENT | Facility: CLINIC | Age: 54
End: 2019-09-26

## 2019-10-01 ENCOUNTER — OFFICE VISIT (OUTPATIENT)
Dept: URGENT CARE | Facility: URGENT CARE | Age: 54
End: 2019-10-01
Payer: COMMERCIAL

## 2019-10-01 VITALS
HEART RATE: 79 BPM | SYSTOLIC BLOOD PRESSURE: 146 MMHG | RESPIRATION RATE: 16 BRPM | BODY MASS INDEX: 29.47 KG/M2 | TEMPERATURE: 97.9 F | WEIGHT: 187.8 LBS | DIASTOLIC BLOOD PRESSURE: 85 MMHG | OXYGEN SATURATION: 99 % | HEIGHT: 67 IN

## 2019-10-01 DIAGNOSIS — J01.90 ACUTE SINUSITIS WITH SYMPTOMS > 10 DAYS: Primary | ICD-10-CM

## 2019-10-01 PROCEDURE — 99213 OFFICE O/P EST LOW 20 MIN: CPT | Performed by: PHYSICIAN ASSISTANT

## 2019-10-01 RX ORDER — FLUTICASONE PROPIONATE 50 MCG
1 SPRAY, SUSPENSION (ML) NASAL DAILY
Qty: 9.9 ML | Refills: 0 | Status: SHIPPED | OUTPATIENT
Start: 2019-10-01 | End: 2019-12-27

## 2019-10-01 RX ORDER — BENZONATATE 200 MG/1
200 CAPSULE ORAL 3 TIMES DAILY PRN
Qty: 30 CAPSULE | Refills: 0 | Status: SHIPPED | OUTPATIENT
Start: 2019-10-01 | End: 2019-10-04

## 2019-10-01 ASSESSMENT — ENCOUNTER SYMPTOMS
CONSTITUTIONAL NEGATIVE: 1
GASTROINTESTINAL NEGATIVE: 1
SINUS PAIN: 1
RHINORRHEA: 1
FEVER: 0
SHORTNESS OF BREATH: 0
SORE THROAT: 0
CHILLS: 0
COUGH: 1
SINUS PRESSURE: 1
CARDIOVASCULAR NEGATIVE: 1
FATIGUE: 0
WHEEZING: 0
PALPITATIONS: 0

## 2019-10-01 ASSESSMENT — MIFFLIN-ST. JEOR: SCORE: 1481.55

## 2019-10-02 NOTE — PROGRESS NOTES
Subjective   Saritha Pearson is a 54 year old female who presents to clinic today for the following health issues:  HPI   RESPIRATORY SYMPTOMS    Duration: 2-3weeks    Description  nasal congestion, rhinorrhea, sore throat, facial pain/pressure, cough and headache    Severity: moderate    Accompanying signs and symptoms: Minimal cough but no shortness of breath or wheezing.  No abdominal pain, n/v, constipation, diarrhea, bloody or black tarry stools.  No fever, chills or sweats.    History (predisposing factors):  Ill contacts.  No pmh of asthma.  Non-smoker.    Precipitating or alleviating factors: None    Therapies tried and outcome:  rest and fluids oral decongestant with minimal relief    Patient Active Problem List   Diagnosis     Constipation     Overweight     Right knee pain     Other postprocedural status(V45.89)     Past Surgical History:   Procedure Laterality Date     ABDOMEN SURGERY      abdominal wall mass     ABDOMINOPLASTY      pt has post op infection     COSMETIC SURGERY       EXCISE LESION TRUNK  10/4/2011    Procedure:EXCISE LESION TRUNK; Excision Abdominal Wall Mass ; Surgeon:CARLITO BETTS; Location:[facility] OR     GYN SURGERY       HYSTERECTOMY       HYSTERECTOMY, PAP NO LONGER INDICATED  11/02/2018       Social History     Tobacco Use     Smoking status: Never Smoker     Smokeless tobacco: Never Used   Substance Use Topics     Alcohol use: Yes     Comment: occasionally     Family History   Problem Relation Age of Onset     Cerebrovascular Disease Father 60     Cerebrovascular Disease Maternal Grandmother      Cancer - colorectal Maternal Grandfather 50     Cancer Paternal Grandmother      Cancer Paternal Grandfather      Cancer - colorectal Sister 40     Breast Cancer Paternal Aunt      Breast Cancer Paternal Aunt      Breast Cancer Paternal Aunt      Breast Cancer Paternal Aunt      Breast Cancer Paternal Aunt          Current Outpatient Medications   Medication Sig Dispense Refill      "albuterol (PROAIR HFA/PROVENTIL HFA/VENTOLIN HFA) 108 (90 BASE) MCG/ACT Inhaler Inhale 2 puffs into the lungs every 4 hours as needed for shortness of breath / dyspnea or wheezing 1 Inhaler 1     fluticasone (FLONASE) 50 MCG/ACT spray SHAKE LIQUID AND USE 1 TO 2 SPRAYS IN EACH NOSTRIL DAILY 9.9 mL 0     hydrocortisone (ANUSOL-HC) 25 MG suppository Place 1 suppository (25 mg) rectally 2 times daily 28 suppository 1     Allergies   Allergen Reactions     Sulfa Drugs Hives, Itching and Rash     Reviewed and updated as needed this visit by Provider       Review of Systems   Constitutional: Negative.  Negative for chills, fatigue and fever.   HENT: Positive for congestion, rhinorrhea, sinus pressure and sinus pain. Negative for ear discharge, ear pain, hearing loss and sore throat.    Respiratory: Positive for cough. Negative for shortness of breath and wheezing.    Cardiovascular: Negative.  Negative for chest pain, palpitations and peripheral edema.   Gastrointestinal: Negative.    Allergic/Immunologic: Positive for environmental allergies.   All other systems reviewed and are negative.           Objective    BP (!) 146/85   Pulse 79   Temp 97.9  F (36.6  C) (Oral)   Resp 16   Ht 1.689 m (5' 6.5\")   Wt 85.2 kg (187 lb 12.8 oz)   SpO2 99%   BMI 29.86 kg/m    Body mass index is 29.86 kg/m .  Physical Exam  Vitals signs and nursing note reviewed.   Constitutional:       General: She is not in acute distress.     Appearance: Normal appearance. She is well-developed.   HENT:      Head: Normocephalic and atraumatic.      Comments: TMs are intact without any erythema or bulging bilaterally.  Airway is patent.     Nose: Nasal tenderness, mucosal edema, congestion and rhinorrhea present. No nasal deformity, septal deviation, signs of injury or laceration. Rhinorrhea is purulent.      Right Turbinates: Swollen.      Left Turbinates: Swollen.      Right Sinus: Maxillary sinus tenderness and frontal sinus tenderness " present.      Left Sinus: Maxillary sinus tenderness and frontal sinus tenderness present.      Mouth/Throat:      Mouth: Mucous membranes are moist.      Pharynx: Uvula midline. No pharyngeal swelling, oropharyngeal exudate or posterior oropharyngeal erythema.      Tonsils: No tonsillar exudate.   Eyes:      General: No scleral icterus.     Extraocular Movements: Extraocular movements intact.      Conjunctiva/sclera: Conjunctivae normal.      Pupils: Pupils are equal, round, and reactive to light.   Neck:      Musculoskeletal: Normal range of motion and neck supple.      Thyroid: No thyromegaly.   Cardiovascular:      Rate and Rhythm: Normal rate and regular rhythm.      Pulses: Normal pulses.      Heart sounds: Normal heart sounds, S1 normal and S2 normal. No murmur. No friction rub. No gallop.    Pulmonary:      Effort: Pulmonary effort is normal. No accessory muscle usage, respiratory distress or retractions.      Breath sounds: Normal breath sounds and air entry. No decreased breath sounds, wheezing, rhonchi or rales.   Lymphadenopathy:      Cervical: No cervical adenopathy.   Skin:     General: Skin is warm and dry.      Nails: There is no clubbing.     Neurological:      Mental Status: She is alert and oriented to person, place, and time.   Psychiatric:         Mood and Affect: Mood normal.         Behavior: Behavior normal.         Thought Content: Thought content normal.         Judgement: Judgment normal.              Assessment & Plan   Acute sinusitis with symptoms > 10 days: Will treat with [id number] for sinusitis and take with food/probiotics to minimize GI upset.  Will also give flonase and tessalon perles.  Recommend tylenol/ibuprofen prn pain/fever and zyrtec/pseudofed for sinus congestion.   Rest, fluids, chicken soup.  Recheck in clinic if symptoms worsen or if symptoms do not improve.    -     amoxicillin-clavulanate (AUGMENTIN) 875-125 MG tablet; Take 1 tablet by mouth 2 times daily for  10 days  -     benzonatate (TESSALON) 200 MG capsule; Take 1 capsule (200 mg) by mouth 3 times daily as needed for cough  -     fluticasone (FLONASE) 50 MCG/ACT nasal spray; Spray 1 spray into both nostrils daily           Sherie See KELLY Moya  [facility]

## 2019-10-04 ENCOUNTER — TELEPHONE (OUTPATIENT)
Dept: FAMILY MEDICINE | Facility: CLINIC | Age: 54
End: 2019-10-04

## 2019-10-04 ENCOUNTER — OFFICE VISIT (OUTPATIENT)
Dept: FAMILY MEDICINE | Facility: CLINIC | Age: 54
End: 2019-10-04
Payer: COMMERCIAL

## 2019-10-04 VITALS
HEART RATE: 73 BPM | TEMPERATURE: 98 F | RESPIRATION RATE: 20 BRPM | HEIGHT: 67 IN | SYSTOLIC BLOOD PRESSURE: 120 MMHG | DIASTOLIC BLOOD PRESSURE: 71 MMHG | BODY MASS INDEX: 29.51 KG/M2 | WEIGHT: 188 LBS

## 2019-10-04 DIAGNOSIS — R93.5 ABNORMAL CT SCAN, PELVIS: ICD-10-CM

## 2019-10-04 DIAGNOSIS — R35.0 URINARY FREQUENCY: Primary | ICD-10-CM

## 2019-10-04 LAB
ALBUMIN UR-MCNC: NEGATIVE MG/DL
AMORPH CRY #/AREA URNS HPF: ABNORMAL /HPF
APPEARANCE UR: CLEAR
BILIRUB UR QL STRIP: NEGATIVE
COLOR UR AUTO: YELLOW
GLUCOSE UR STRIP-MCNC: NEGATIVE MG/DL
HGB UR QL STRIP: ABNORMAL
KETONES UR STRIP-MCNC: NEGATIVE MG/DL
LEUKOCYTE ESTERASE UR QL STRIP: NEGATIVE
MUCOUS THREADS #/AREA URNS LPF: PRESENT /LPF
NITRATE UR QL: NEGATIVE
NON-SQ EPI CELLS #/AREA URNS LPF: ABNORMAL /LPF
PH UR STRIP: 6.5 PH (ref 5–7)
RBC #/AREA URNS AUTO: ABNORMAL /HPF
SOURCE: ABNORMAL
SP GR UR STRIP: 1.02 (ref 1–1.03)
UROBILINOGEN UR STRIP-ACNC: 0.2 EU/DL (ref 0.2–1)
WBC #/AREA URNS AUTO: ABNORMAL /HPF

## 2019-10-04 PROCEDURE — 99214 OFFICE O/P EST MOD 30 MIN: CPT | Performed by: FAMILY MEDICINE

## 2019-10-04 PROCEDURE — 81001 URINALYSIS AUTO W/SCOPE: CPT | Performed by: FAMILY MEDICINE

## 2019-10-04 ASSESSMENT — MIFFLIN-ST. JEOR: SCORE: 1482.45

## 2019-10-04 ASSESSMENT — PAIN SCALES - GENERAL: PAINLEVEL: EXTREME PAIN (8)

## 2019-10-04 NOTE — PATIENT INSTRUCTIONS
1. Try to eat fresh fruits and veggies daily.    2. Increase the Miralax to have daily soft bowel movements.    3. Set up the ultrasound - [phone].    4. I will contact you with results.    5. See you in about one month for a physical with fasting labs.

## 2019-10-04 NOTE — PROGRESS NOTES
HPI:    Jess is a 54 year old female here to discuss:    Constipation and chronic abd pain - more recently (about one month) she has had abd pain to the left of the umbilicus. She feels bloated. She has BM's every 2 days that are hard to evacuate. No blood in stool or black stools. No fevers. She has some urinary frequency with difficulty emptying the bladder. This is baseline for her. No dysuria or hematuria.  Evaluation and treatment:    She has had abdominal surgeries, including hernia surgery in the pat - I suspect adhesions.   Miralax bid helps. I asked her to increase to tid or qid until daily BM's that are soft.    She follows with GI - I got a CT report done at [facility] dated 9/25/19 - showed 1.6 right adnexal cyst and otherwise was unremarkable.   She also had recent colonoscopy - polyp was resected.   I am ordering Pelvic U/S for her.   U/A today unremarkable.   I asked her to eat daily fresh fruits and veggies.     CT ABDOMEN AND PELVIS WITH CONTRAST 1/27/2016 2:50 PM        IMPRESSION:  1. Fluid distention of the colon without obvious obstructing mass or  debris. Oral contrast reaches the right colon and the small bowel is  nondistended. No evidence of obstruction or diverticulitis. Appendix  is normal.  2. Evidence of old granulomatous disease.    Results for orders placed or performed in visit on 10/04/19   UA reflex to Microscopic and Culture   Result Value Ref Range    Color Urine Yellow     Appearance Urine Clear     Glucose Urine Negative NEG^Negative mg/dL    Bilirubin Urine Negative NEG^Negative    Ketones Urine Negative NEG^Negative mg/dL    Specific Gravity Urine 1.020 1.003 - 1.035    Blood Urine Trace (A) NEG^Negative    pH Urine 6.5 5.0 - 7.0 pH    Protein Albumin Urine Negative NEG^Negative mg/dL    Urobilinogen Urine 0.2 0.2 - 1.0 EU/dL    Nitrite Urine Negative NEG^Negative    Leukocyte Esterase Urine Negative NEG^Negative    Source Midstream Urine    Urine Microscopic   Result Value Ref  Range    WBC Urine 0 - 5 OTO5^0 - 5 /HPF    RBC Urine O - 2 OTO2^O - 2 /HPF    Squamous Epithelial /LPF Urine Few FEW^Few /LPF    Amorphous Crystals Few (A) NEG^Negative /HPF    Mucous Urine Present (A) NEG^Negative /LPF       Recurrent maxillary sinusitis -   Evaluation and treatment:    Augmentin is used when symptoms occur.    Overweight - Diet and exercise discussed.    Wt Readings from Last 5 Encounters:   10/04/19 85.3 kg (188 lb)   10/01/19 85.2 kg (187 lb 12.8 oz)   01/16/19 83.9 kg (185 lb)   11/02/18 85.3 kg (188 lb)   08/08/17 83.5 kg (184 lb)     Body mass index is 29.89 kg/m .    Surgical history:   Multiple cosmetic surgeries   Hysterectomy - supracervical?   Right knee arthroscopic surgery    Preventive:  She declines flu shot. She declines Shingles vaccine.    Immunization History   Administered Date(s) Administered     TDAP Vaccine (Adacel) 11/02/2018     Tdap (Adacel,Boostrix) 12/03/2008       Lipids screen:    Recent Labs   Lab Test  07/27/17   1432  04/26/16   1157   CHOL  168  147   HDL  65  64   LDL  87  66   TRIG  81  85       STD screen: negative HIV, syphilis, GC/chlamydia 7/27/17 - she wanted repeat testing which I ordered.    Anemia screen:     CBC RESULTS:   Recent Labs   Lab Test  04/22/18   0451   WBC  6.7   RBC  4.68   HGB  12.5   HCT  37.7   MCV  81   MCH  26.7   MCHC  33.2   RDW  13.2   PLT  330     Diabetes screen:     Last Comprehensive Metabolic Panel:  Sodium   Date Value Ref Range Status   11/02/2018 141 133 - 144 mmol/L Final     Potassium   Date Value Ref Range Status   11/02/2018 3.7 3.4 - 5.3 mmol/L Final     Chloride   Date Value Ref Range Status   11/02/2018 106 94 - 109 mmol/L Final     Carbon Dioxide   Date Value Ref Range Status   11/02/2018 26 20 - 32 mmol/L Final     Anion Gap   Date Value Ref Range Status   11/02/2018 9 3 - 14 mmol/L Final     Glucose   Date Value Ref Range Status   11/02/2018 98 70 - 99 mg/dL Final     Comment:     Non Fasting     Urea Nitrogen  "  Date Value Ref Range Status   11/02/2018 6 (L) 7 - 30 mg/dL Final     Creatinine   Date Value Ref Range Status   11/02/2018 0.66 0.52 - 1.04 mg/dL Final     GFR Estimate   Date Value Ref Range Status   11/02/2018 >90 >60 mL/min/1.7m2 Final     Comment:     Non  GFR Calc     Calcium   Date Value Ref Range Status   11/02/2018 9.5 8.5 - 10.1 mg/dL Final     Mammogram: May 2018 - was told it was ok after doing additional imaging.    Contraception: hysterectomy, supracervical? Collected again - the cervix was difficult to find and seemed anatomically altered.    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  CV: Heart RRR with no murmurs. No JVD, carotid bruits or leg edema.  Abd: non specific mild tenderness around the umbilicus without rebound or guarding. Otherwise soft, non distended, with normal bowel sounds. No liver or spleen enlargement. No masses. No hernias.        Assessment and Plan - Decision Making    1. Urinary frequency    Per HPI    - UA reflex to Microscopic and Culture  - Urine Microscopic    2. Abnormal CT scan, pelvis    Per HPI    - US Pelvic Complete w Transvaginal; Future      Written instructions given as follows:    Patient Instructions   1. Try to eat fresh fruits and veggies daily.    2. Increase the Miralax to have daily soft bowel movements.    3. Set up the ultrasound - [phone].    4. I will contact you with results.    5. See you in about one month for a physical with fasting labs.      "

## 2019-10-04 NOTE — NURSING NOTE
"Chief Complaint   Patient presents with     Abdominal Pain       Initial /71   Pulse 73   Temp 98  F (36.7  C) (Oral)   Resp 20   Ht 1.689 m (5' 6.5\")   Wt 85.3 kg (188 lb)   BMI 29.89 kg/m   Estimated body mass index is 29.89 kg/m  as calculated from the following:    Height as of this encounter: 1.689 m (5' 6.5\").    Weight as of this encounter: 85.3 kg (188 lb).    Zahida Forrest CMA    "

## 2019-10-04 NOTE — TELEPHONE ENCOUNTER
Prior Authorization Retail Medication Request    Medication/Dose: benzonatate (TESSALON) 200 MG capsule  ICD code (if different than what is on RX):    Previously Tried and Failed:    Rationale:      Insurance Name: [phone]   Insurance ID:  [id number]      Pharmacy Information (if different than what is on RX)  Name:    Phone:

## 2019-10-08 ENCOUNTER — HOSPITAL ENCOUNTER (OUTPATIENT)
Dept: ULTRASOUND IMAGING | Facility: CLINIC | Age: 54
Discharge: HOME | End: 2019-10-08
Attending: FAMILY MEDICINE | Admitting: FAMILY MEDICINE
Payer: COMMERCIAL

## 2019-10-08 DIAGNOSIS — R93.5 ABNORMAL CT SCAN, PELVIS: ICD-10-CM

## 2019-10-08 PROCEDURE — 76830 TRANSVAGINAL US NON-OB: CPT

## 2019-10-08 NOTE — TELEPHONE ENCOUNTER
Central Prior Authorization Team   Phone: [phone]        PA Initiation    Medication: benzonatate (TESSALON) 200 MG capsule  Insurance Company: [employer] - Phone [phone] Fax [phone]  Pharmacy Filling the Rx: [id number] - [place], [place] - [address] AT [address] [place] [address] & [facility]  Filling Pharmacy Phone: [phone]  Filling Pharmacy Fax:    Start Date: 10/8/2019

## 2019-10-08 NOTE — TELEPHONE ENCOUNTER
PRIOR AUTHORIZATION DENIED    Medication: benzonatate (TESSALON) 200 MG capsule    Denial Date: 10/8/2019    Denial Rational:      Appeal Information:    If you would like to appeal, please supply P/A team with a letter of medical necessity with clinical reason.

## 2019-10-15 ENCOUNTER — OFFICE VISIT (OUTPATIENT)
Dept: FAMILY MEDICINE | Facility: CLINIC | Age: 54
End: 2019-10-15
Payer: COMMERCIAL

## 2019-10-15 VITALS
TEMPERATURE: 98.2 F | WEIGHT: 188 LBS | HEART RATE: 73 BPM | RESPIRATION RATE: 18 BRPM | BODY MASS INDEX: 29.89 KG/M2 | SYSTOLIC BLOOD PRESSURE: 113 MMHG | DIASTOLIC BLOOD PRESSURE: 68 MMHG

## 2019-10-15 DIAGNOSIS — Z12.4 SCREENING FOR CERVICAL CANCER: ICD-10-CM

## 2019-10-15 DIAGNOSIS — Z11.3 SCREEN FOR STD (SEXUALLY TRANSMITTED DISEASE): Primary | ICD-10-CM

## 2019-10-15 DIAGNOSIS — Z12.31 ENCOUNTER FOR SCREENING MAMMOGRAM FOR BREAST CANCER: ICD-10-CM

## 2019-10-15 LAB
SPECIMEN SOURCE: ABNORMAL
WET PREP SPEC: ABNORMAL

## 2019-10-15 PROCEDURE — 87210 SMEAR WET MOUNT SALINE/INK: CPT | Performed by: FAMILY MEDICINE

## 2019-10-15 PROCEDURE — 99214 OFFICE O/P EST MOD 30 MIN: CPT | Performed by: FAMILY MEDICINE

## 2019-10-15 PROCEDURE — G0145 SCR C/V CYTO,THINLAYER,RESCR: HCPCS | Performed by: FAMILY MEDICINE

## 2019-10-15 PROCEDURE — 87591 N.GONORRHOEAE DNA AMP PROB: CPT | Performed by: FAMILY MEDICINE

## 2019-10-15 PROCEDURE — G0476 HPV COMBO ASSAY CA SCREEN: HCPCS | Performed by: FAMILY MEDICINE

## 2019-10-15 PROCEDURE — 87491 CHLMYD TRACH DNA AMP PROBE: CPT | Performed by: FAMILY MEDICINE

## 2019-10-15 NOTE — PROGRESS NOTES
HPI:    Jess is a 54 year old female here to discuss:    Constipation and chronic abd pain - more recently (about one month) she has had abd pain to the left of the umbilicus. She feels bloated. She has BM's every 2 days that are hard to evacuate. No blood in stool or black stools. No fevers. She has some urinary frequency with difficulty emptying the bladder. This is baseline for her. No dysuria or hematuria.  Evaluation and treatment:    She has had abdominal surgeries, including hernia surgery in the pat - I suspect adhesions.   Miralax bid helps. I asked her to increase to tid or qid until daily BM's that are soft.    She follows with GI - I got a CT report done at [facility] dated 9/25/19 - showed 1.6 right adnexal cyst and otherwise was unremarkable.   She also had recent colonoscopy - polyp was resected.   Pelvic U/S 10/8/19 - 1.7 cm cyst right ovary - I told her I did not think this was causing her pain and does not require follow-up unless symptoms change.   U/A 10/4/19 unremarkable.   I asked her to eat daily fresh fruits and veggies.   I asked her to try Metamucil twice per day.     CT ABDOMEN AND PELVIS WITH CONTRAST 1/27/2016 2:50 PM        IMPRESSION:  1. Fluid distention of the colon without obvious obstructing mass or  debris. Oral contrast reaches the right colon and the small bowel is  nondistended. No evidence of obstruction or diverticulitis. Appendix  is normal.  2. Evidence of old granulomatous disease.    Results for orders placed or performed during the hospital encounter of 10/08/19   US Pelvic Complete w Transvaginal    Narrative    ULTRASOUND PELVIC COMPLETE WITH TRANSVAGINAL IMAGING 10/8/2019 4:45 PM    HISTORY: 54-year-old woman with history of hysterectomy and right  adnexal cyst on outside CT examination.    COMPARISON: None. The outside CT is not available for review.    FINDINGS: Transabdominal as well as transvaginal images were obtained.  Transvaginal images were required for better  visualization of pelvic  structures. The uterus is not definitively identified. Cystic  collection is noted; somewhat difficult to determine if this is a  segment of nonmobile bowel or the ovary. What is thought to be the  ovary measures 3.6 x 1.6 x 1.9 cm. There is an anechoic collection  measuring 1.5 x 1.7 x 1.6 cm. Blood flow is confirmed. Left ovary is  not visualized. Of note, previous CT examination on January 27, 2016  demonstrates a cystic collection in the right hemipelvis measuring  approximately 2.3 x 1.9 cm.      Impression    IMPRESSION:  Simple-appearing cystic collection in the right  hemipelvis, somewhat difficult to confirm if within the ovary. Suspect  that it may be and measures up to 1.7 cm.    MICHAEL WALSH MD     Yeast vaginitis - she has no symptoms.   Evaluation and treatment:    Screening wet prep showed yeast - I will send in Diflucan for her.    Recurrent maxillary sinusitis -   Evaluation and treatment:    Augmentin is used when symptoms occur.    Overweight - Diet and exercise discussed.    Wt Readings from Last 5 Encounters:   10/15/19 85.3 kg (188 lb)   10/04/19 85.3 kg (188 lb)   10/01/19 85.2 kg (187 lb 12.8 oz)   01/16/19 83.9 kg (185 lb)   11/02/18 85.3 kg (188 lb)     Body mass index is 29.89 kg/m .    Surgical history:   Multiple cosmetic surgeries   Hysterectomy - supracervical?   Right knee arthroscopic surgery    Preventive:  She declines flu shot. She declines Shingles vaccine.    Immunization History   Administered Date(s) Administered     TDAP Vaccine (Adacel) 11/02/2018     Tdap (Adacel,Boostrix) 12/03/2008       Lipids screen:    Recent Labs   Lab Test 07/27/17  1432 04/26/16  1157   CHOL 168 147   HDL 65 64   LDL 87 66   TRIG 81 85     STD screen: collected today.    Anemia screen:     CBC RESULTS:   Recent Labs   Lab Test 04/22/18  0451   WBC 6.7   RBC 4.68   HGB 12.5   HCT 37.7   MCV 81   MCH 26.7   MCHC 33.2   RDW 13.2            Diabetes screen:     Last  Comprehensive Metabolic Panel:  Sodium   Date Value Ref Range Status   11/02/2018 141 133 - 144 mmol/L Final     Potassium   Date Value Ref Range Status   11/02/2018 3.7 3.4 - 5.3 mmol/L Final     Chloride   Date Value Ref Range Status   11/02/2018 106 94 - 109 mmol/L Final     Carbon Dioxide   Date Value Ref Range Status   11/02/2018 26 20 - 32 mmol/L Final     Anion Gap   Date Value Ref Range Status   11/02/2018 9 3 - 14 mmol/L Final     Glucose   Date Value Ref Range Status   11/02/2018 98 70 - 99 mg/dL Final     Comment:     Non Fasting     Urea Nitrogen   Date Value Ref Range Status   11/02/2018 6 (L) 7 - 30 mg/dL Final     Creatinine   Date Value Ref Range Status   11/02/2018 0.66 0.52 - 1.04 mg/dL Final     GFR Estimate   Date Value Ref Range Status   11/02/2018 >90 >60 mL/min/1.7m2 Final     Comment:     Non  GFR Calc     Calcium   Date Value Ref Range Status   11/02/2018 9.5 8.5 - 10.1 mg/dL Final     Mammogram: May 2018 - was told it was ok after doing additional imaging. Reordered.    Contraception: n/a due to hysterectomy.    PAP - hysterectomy - review of records via Care Everywhere says hysterectomy - pathology says there was cervix. On exam I don't see cervix but she says her surgeon (Dr. Chambers) told her the cervix was left behind - I collected a vaginal cuff PAP and HPV. But I told her she does not need this any more. But she can also make an appointment with Dr. Chambers to clarify the issue.     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  CV:  Heart RRR with no murmurs. No JVD, carotid bruits or leg edema.  Abd: non specific mild tenderness around the umbilicus without rebound or guarding. Otherwise soft, non distended, with normal bowel sounds. No liver or spleen enlargement. No masses. No hernias.    The following exams were done in the presence of Zahida Forrest CMA.    : normal female external genitalia, vaginal mucosa pink, cheezy small amount of dry discharge, not rugated - cervix is absent.      Assessment and Plan - Decision Making    1. Screen for STD (sexually transmitted disease)    Per HPI    - Wet prep  - Neisseria gonorrhoeae PCR  - Chlamydia trachomatis PCR    2. Screening for cervical cancer    Per HPI    - HPV High Risk Types DNA Cervical  - Pap imaged thin layer screen with HPV - recommended age 30 - 65 years (select HPV order below)    3. Encounter for screening mammogram for breast cancer    Per HPI    - MA Screening Digital Bilateral; Future

## 2019-10-15 NOTE — NURSING NOTE
"Chief Complaint   Patient presents with     Results       Initial /68   Pulse 73   Temp 98.2  F (36.8  C) (Oral)   Resp 18   Wt 85.3 kg (188 lb)   BMI 29.89 kg/m   Estimated body mass index is 29.89 kg/m  as calculated from the following:    Height as of 10/4/19: 1.689 m (5' 6.5\").    Weight as of this encounter: 85.3 kg (188 lb).    Zahida Forrest CMA    "

## 2019-10-16 DIAGNOSIS — B37.31 YEAST INFECTION OF THE VAGINA: Primary | ICD-10-CM

## 2019-10-16 RX ORDER — FLUCONAZOLE 150 MG/1
150 TABLET ORAL ONCE
Qty: 1 TABLET | Refills: 0 | Status: SHIPPED | OUTPATIENT
Start: 2019-10-16 | End: 2020-03-03

## 2019-10-18 LAB
COPATH REPORT: NORMAL
PAP: NORMAL

## 2019-10-22 PROBLEM — R87.810 CERVICAL HIGH RISK HPV (HUMAN PAPILLOMAVIRUS) TEST POSITIVE: Status: RESOLVED | Noted: 2018-11-02 | Resolved: 2018-11-21

## 2019-11-01 ENCOUNTER — HEALTH MAINTENANCE LETTER (OUTPATIENT)
Age: 54
End: 2019-11-01

## 2019-12-26 DIAGNOSIS — J01.90 ACUTE SINUSITIS WITH SYMPTOMS > 10 DAYS: ICD-10-CM

## 2019-12-26 NOTE — TELEPHONE ENCOUNTER
Medication diagnosis is sinusitis.    Routing to provider to advise.  Geraldine Jung BSN, RN

## 2019-12-27 RX ORDER — FLUTICASONE PROPIONATE 50 MCG
1 SPRAY, SUSPENSION (ML) NASAL DAILY
Qty: 9.9 ML | Refills: 11 | Status: SHIPPED | OUTPATIENT
Start: 2019-12-27 | End: 2020-07-22

## 2020-01-22 ENCOUNTER — MYC MEDICAL ADVICE (OUTPATIENT)
Dept: FAMILY MEDICINE | Facility: CLINIC | Age: 55
End: 2020-01-22

## 2020-01-22 ENCOUNTER — TELEPHONE (OUTPATIENT)
Dept: FAMILY MEDICINE | Facility: CLINIC | Age: 55
End: 2020-01-22

## 2020-01-22 DIAGNOSIS — N63.0 LUMP OR MASS IN BREAST: Primary | ICD-10-CM

## 2020-01-22 NOTE — TELEPHONE ENCOUNTER
[facility] states they need a diagnostic mammogram order not a screening mammogram order.  Can you put in a new order for diagnostic order?  Zahida Zuleta BSN, RN

## 2020-01-22 NOTE — TELEPHONE ENCOUNTER
I ordered the mammogram - please fax to [facility].    Let patient know - she should see me after the imaging as it may be a cyst, lipoma or lymph node or other.    Lalo Gold M.D.

## 2020-01-22 NOTE — TELEPHONE ENCOUNTER
Cecelia called from [facility]. Needs Dr Gold's nurse to call her back about Saritha Pearson [phone]. Question about orders. [phone].

## 2020-01-22 NOTE — TELEPHONE ENCOUNTER
Diagnostic order is in chart.  I will refax.  Done and Cecelia notified.    Zahida Zuleta BSN, RN

## 2020-01-23 ENCOUNTER — TRANSFERRED RECORDS (OUTPATIENT)
Dept: MULTI SPECIALTY CLINIC | Facility: CLINIC | Age: 55
End: 2020-01-23

## 2020-01-24 ENCOUNTER — OFFICE VISIT (OUTPATIENT)
Dept: FAMILY MEDICINE | Facility: CLINIC | Age: 55
End: 2020-01-24
Payer: COMMERCIAL

## 2020-01-24 ENCOUNTER — TELEPHONE (OUTPATIENT)
Dept: FAMILY MEDICINE | Facility: CLINIC | Age: 55
End: 2020-01-24

## 2020-01-24 VITALS
DIASTOLIC BLOOD PRESSURE: 76 MMHG | TEMPERATURE: 98.3 F | HEART RATE: 62 BPM | SYSTOLIC BLOOD PRESSURE: 122 MMHG | WEIGHT: 189 LBS | OXYGEN SATURATION: 97 % | BODY MASS INDEX: 30.05 KG/M2

## 2020-01-24 DIAGNOSIS — L72.9 INFECTED CYST OF SKIN: Primary | ICD-10-CM

## 2020-01-24 DIAGNOSIS — L08.9 INFECTED CYST OF SKIN: Primary | ICD-10-CM

## 2020-01-24 PROCEDURE — 99213 OFFICE O/P EST LOW 20 MIN: CPT | Performed by: FAMILY MEDICINE

## 2020-01-24 RX ORDER — DOXYCYCLINE 100 MG/1
100 CAPSULE ORAL 2 TIMES DAILY
Qty: 14 CAPSULE | Refills: 0 | Status: SHIPPED | OUTPATIENT
Start: 2020-01-24 | End: 2020-03-31

## 2020-01-24 NOTE — TELEPHONE ENCOUNTER
Please abstract the following data from this visit with this patient into the appropriate field in Epic:    Other Tests found in the patient's chart through Chart Review/Care Everywhere:    Mammogram done by this group [facility] in [place] on this date: 01-    Copy of report was sent to scanning

## 2020-01-24 NOTE — PROGRESS NOTES
HPI:    Saritha Pearson is an 54 year old female who presents for evaluation of skin lump.    First noticed: a couple of weeks ago  Location: right axilla   Changed in size: yes  Pain: yes  Itching: no  Other symptoms or concerns: no fevers or drainage  Evaluation and treatment:    Diagnostic mammogram 1/23/20 at [facility] - negative except cyst under skin.   Since it is tender, I suspect it may be infected - we will treat with Doxy.   I offered to remove it or refer to surgeon - she chose the former. We will set her up for that.    Exam:    /76 (Cuff Size: Adult Large)   Pulse 62   Temp 98.3  F (36.8  C) (Oral)   Wt 85.7 kg (189 lb)   SpO2 97%   Breastfeeding No   BMI 30.05 kg/m      Gen: Healthy appearing female in no acute distress.  Skin: the right axilla has about 1 cm subcutaneous lump which is cystic and firm, mobile. Slight tenderness without redness, discharge or fluctuance.      Assessment and Plan - Decision Making    1. Infected cyst of skin    Per HPI    - doxycycline hyclate (VIBRAMYCIN) 100 MG capsule; Take 1 capsule (100 mg) by mouth 2 times daily  Dispense: 14 capsule; Refill: 0      Written instructions given as follows:    Patient Instructions   1. Take the antibiotic as prescribed.    2. See you soon to remove the cyst.

## 2020-01-25 ENCOUNTER — MYC MEDICAL ADVICE (OUTPATIENT)
Dept: FAMILY MEDICINE | Facility: CLINIC | Age: 55
End: 2020-01-25

## 2020-01-27 NOTE — TELEPHONE ENCOUNTER
Patient currently has appointment 2/4 for cyst removal under arm, it is a 40 minute appointment.   To provider to advise if can remove lump on side of neck at same time    Dayna PARIKHN, RN, CPN

## 2020-02-04 ENCOUNTER — MYC MEDICAL ADVICE (OUTPATIENT)
Dept: FAMILY MEDICINE | Facility: CLINIC | Age: 55
End: 2020-02-04

## 2020-02-04 ENCOUNTER — OFFICE VISIT (OUTPATIENT)
Dept: FAMILY MEDICINE | Facility: CLINIC | Age: 55
End: 2020-02-04
Payer: COMMERCIAL

## 2020-02-04 VITALS — SYSTOLIC BLOOD PRESSURE: 138 MMHG | TEMPERATURE: 98.7 F | DIASTOLIC BLOOD PRESSURE: 76 MMHG | HEART RATE: 88 BPM

## 2020-02-04 DIAGNOSIS — L08.9 INFECTED CYST OF SKIN: Primary | ICD-10-CM

## 2020-02-04 DIAGNOSIS — L72.9 INFECTED CYST OF SKIN: Primary | ICD-10-CM

## 2020-02-04 PROCEDURE — 99213 OFFICE O/P EST LOW 20 MIN: CPT | Mod: 25 | Performed by: FAMILY MEDICINE

## 2020-02-04 PROCEDURE — 10060 I&D ABSCESS SIMPLE/SINGLE: CPT | Performed by: FAMILY MEDICINE

## 2020-02-04 RX ORDER — CLINDAMYCIN HCL 300 MG
300 CAPSULE ORAL 3 TIMES DAILY
Qty: 21 CAPSULE | Refills: 0 | Status: SHIPPED | OUTPATIENT
Start: 2020-02-04 | End: 2020-03-31

## 2020-02-04 NOTE — PROGRESS NOTES
HPI:    Jess is a 54 year old female here for:     Infected cyst - symptoms present since around early Jan 2020. Located on right axilla. There is swelling, redness, pain. Denies fevers or chills. No discharge.   Evaluation and treatment:    On 1/24/20 I placed her on Doxy. This has not helped.   I&D performed today.    See below for procedure.    We will place patient on Clinda.   Recheck in 2 days.    She also wanted me to look at a lump on the left side of the neck. I will refer her to surgeon when she comes back in 2 days for recheck.    Exam:    /76   Pulse 88   Temp 98.7  F (37.1  C) (Oral)     Gen: Healthy appearing female in no acute distress. Here with friend, Mel.  Lungs: Good air movement and otherwise clear.  CV: Heart RRR with no murmurs. No JVD, carotid bruits or leg edema.  Skin: the right axilla has scar from previous procedure. Adjacent to it there is a swelling about 2 cm. There is redness, tenderness and fluctuance. The left side of the neck has about 1 cm subcutaneous lump which is mobile. Non tender without redness or fluctuance.    Assessment and Plan - Decision Making    1. Infected cyst of skin    Small risk of infection spread, bleeding and poor cosmetic outcome discussed. she gave verbal and written consent. I had her lie supine on the exam table. Aseptic technique was used. I cleaned the area with alcohol and betadine.     About 1 cc of lidocaine with epinephrine was used for local anesthesia using 27 gauge needle. I then made a stab incision using 11 blade. A small amount of purulent material and cyst material was expressed. The wound was further explored using curved hemostat to break apart inflammatory soft tissue. I made a small skin defect to allow continued drainage. There was minimal bleeding controlled with local pressure. Packing gauze was placed. Written instructions reviewed.    - clindamycin (CLEOCIN) 300 MG capsule; Take 1 capsule (300 mg) by mouth 3 times daily   Dispense: 21 capsule; Refill: 0  - DRAIN SKIN ABSCESS SIMPLE/SINGLE      Written instructions given as follows:    Patient Instructions   1. You may remove the gauze in 24 hours.    2. After that use the shower head to wash with soap and water twice per day.    3. Clindamycin three times per day for 1 week.    4. See you in a couple of days.

## 2020-02-04 NOTE — PATIENT INSTRUCTIONS
1. You may remove the gauze in 24 hours.    2. After that use the shower head to wash with soap and water twice per day.    3. Clindamycin three times per day for 1 week.    4. See you in a couple of days.

## 2020-02-06 ENCOUNTER — OFFICE VISIT (OUTPATIENT)
Dept: FAMILY MEDICINE | Facility: CLINIC | Age: 55
End: 2020-02-06
Payer: COMMERCIAL

## 2020-02-06 VITALS
SYSTOLIC BLOOD PRESSURE: 134 MMHG | WEIGHT: 191 LBS | HEART RATE: 84 BPM | TEMPERATURE: 98.3 F | DIASTOLIC BLOOD PRESSURE: 83 MMHG | BODY MASS INDEX: 29.98 KG/M2 | HEIGHT: 67 IN

## 2020-02-06 DIAGNOSIS — L08.9 INFECTED CYST OF SKIN: Primary | ICD-10-CM

## 2020-02-06 DIAGNOSIS — R22.9 LUMP OF SKIN: ICD-10-CM

## 2020-02-06 DIAGNOSIS — L72.9 INFECTED CYST OF SKIN: Primary | ICD-10-CM

## 2020-02-06 ASSESSMENT — MIFFLIN-ST. JEOR: SCORE: 1496.06

## 2020-02-06 NOTE — PATIENT INSTRUCTIONS
1. Set up the surgery consult in a couple of weeks - [phone].    2. See me for a physical with fasting labs.

## 2020-02-06 NOTE — PROGRESS NOTES
"HPI:    Jess is a 54 year old female here for wound check:    Infected cyst - symptoms present since around early Jan 2020. Located on right axilla. There is swelling, redness, pain. Denies fevers or chills. No discharge.   Evaluation and treatment:    On 1/24/20 I placed her on Doxy. This did not help.   I&D performed 2/4/20 with packing gauze and placed her on Clinda.   Things have improved.    I am referring her to surgeon for definitive cyst removal.     Lump on the left side of the neck -  Evaluation and treatment:    Refer to surgeon.     Exam:    /83 (Cuff Size: Adult Regular)   Pulse 84   Temp 98.3  F (36.8  C) (Oral)   Ht 1.689 m (5' 6.5\")   Wt 86.6 kg (191 lb)   Breastfeeding No   BMI 30.37 kg/m      Gen: Healthy appearing female in no acute distress. Here with friend, Mel.  Lungs: Good air movement and otherwise clear.  CV: Heart RRR with no murmurs. No JVD, carotid bruits or leg edema.  Skin: the right axilla now has the previously made skin defect which has slight drainage. The surrounding induration has improved. There is still mild tenderness. No fluctuance.     Assessment and Plan - Decision Making    1. Infected cyst of skin    Per HPI    - GENERAL SURG ADULT REFERRAL    2. Lump of skin    Per HPI    - GENERAL SURG ADULT REFERRAL      Written instructions given as follows:    Patient Instructions   1. Set up the surgery consult in a couple of weeks - [phone].    2. See me for a physical with fasting labs.            "

## 2020-02-08 ENCOUNTER — HEALTH MAINTENANCE LETTER (OUTPATIENT)
Age: 55
End: 2020-02-08

## 2020-02-11 ENCOUNTER — TRANSFERRED RECORDS (OUTPATIENT)
Dept: HEALTH INFORMATION MANAGEMENT | Facility: CLINIC | Age: 55
End: 2020-02-11

## 2020-02-25 ENCOUNTER — DOCUMENTATION ONLY (OUTPATIENT)
Dept: LAB | Facility: CLINIC | Age: 55
End: 2020-02-25

## 2020-02-25 ENCOUNTER — OFFICE VISIT (OUTPATIENT)
Dept: SURGERY | Facility: CLINIC | Age: 55
End: 2020-02-25
Payer: COMMERCIAL

## 2020-02-25 ENCOUNTER — TELEPHONE (OUTPATIENT)
Dept: SURGERY | Facility: CLINIC | Age: 55
End: 2020-02-25

## 2020-02-25 ENCOUNTER — DOCUMENTATION ONLY (OUTPATIENT)
Dept: FAMILY MEDICINE | Facility: CLINIC | Age: 55
End: 2020-02-25

## 2020-02-25 ENCOUNTER — HOSPITAL ENCOUNTER (OUTPATIENT)
Facility: AMBULATORY SURGERY CENTER | Age: 55
End: 2020-02-25
Attending: SURGERY | Admitting: SURGERY
Payer: COMMERCIAL

## 2020-02-25 VITALS
DIASTOLIC BLOOD PRESSURE: 82 MMHG | HEIGHT: 72 IN | SYSTOLIC BLOOD PRESSURE: 122 MMHG | OXYGEN SATURATION: 98 % | WEIGHT: 196 LBS | HEART RATE: 85 BPM | RESPIRATION RATE: 16 BRPM | BODY MASS INDEX: 26.55 KG/M2

## 2020-02-25 DIAGNOSIS — Z00.00 ROUTINE HISTORY AND PHYSICAL EXAMINATION OF ADULT: ICD-10-CM

## 2020-02-25 DIAGNOSIS — Z13.0 SCREENING, ANEMIA, DEFICIENCY, IRON: Primary | ICD-10-CM

## 2020-02-25 DIAGNOSIS — Z13.0 SCREENING, ANEMIA, DEFICIENCY, IRON: ICD-10-CM

## 2020-02-25 DIAGNOSIS — L72.3 SEBACEOUS CYST: ICD-10-CM

## 2020-02-25 DIAGNOSIS — Z13.1 SCREENING FOR DIABETES MELLITUS: ICD-10-CM

## 2020-02-25 DIAGNOSIS — Z13.220 SCREENING FOR CHOLESTEROL LEVEL: ICD-10-CM

## 2020-02-25 DIAGNOSIS — Z00.00 ROUTINE HISTORY AND PHYSICAL EXAMINATION OF ADULT: Primary | ICD-10-CM

## 2020-02-25 DIAGNOSIS — L72.3 SEBACEOUS CYST: Primary | ICD-10-CM

## 2020-02-25 LAB
DIFFERENTIAL METHOD BLD: NORMAL
EOSINOPHIL # BLD AUTO: 0.1 10E9/L (ref 0–0.7)
EOSINOPHIL NFR BLD AUTO: 2 %
ERYTHROCYTE [DISTWIDTH] IN BLOOD BY AUTOMATED COUNT: 12.7 % (ref 10–15)
HCT VFR BLD AUTO: 39.8 % (ref 35–47)
HGB BLD-MCNC: 12.7 G/DL (ref 11.7–15.7)
LYMPHOCYTES # BLD AUTO: 3.5 10E9/L (ref 0.8–5.3)
LYMPHOCYTES NFR BLD AUTO: 57 %
MCH RBC QN AUTO: 26.5 PG (ref 26.5–33)
MCHC RBC AUTO-ENTMCNC: 31.9 G/DL (ref 31.5–36.5)
MCV RBC AUTO: 83 FL (ref 78–100)
MONOCYTES # BLD AUTO: 0.4 10E9/L (ref 0–1.3)
MONOCYTES NFR BLD AUTO: 7 %
NEUTROPHILS # BLD AUTO: 2 10E9/L (ref 1.6–8.3)
NEUTROPHILS NFR BLD AUTO: 34 %
PLATELET # BLD AUTO: 340 10E9/L (ref 150–450)
PLATELET # BLD EST: NORMAL 10*3/UL
POIKILOCYTOSIS BLD QL SMEAR: NORMAL
RBC # BLD AUTO: 4.79 10E12/L (ref 3.8–5.2)
STOMATOCYTES BLD QL SMEAR: NORMAL
WBC # BLD AUTO: 6 10E9/L (ref 4–11)

## 2020-02-25 PROCEDURE — 85025 COMPLETE CBC W/AUTO DIFF WBC: CPT | Performed by: FAMILY MEDICINE

## 2020-02-25 PROCEDURE — 80048 BASIC METABOLIC PNL TOTAL CA: CPT | Performed by: FAMILY MEDICINE

## 2020-02-25 PROCEDURE — 80061 LIPID PANEL: CPT | Performed by: FAMILY MEDICINE

## 2020-02-25 PROCEDURE — 36415 COLL VENOUS BLD VENIPUNCTURE: CPT | Performed by: FAMILY MEDICINE

## 2020-02-25 PROCEDURE — 99203 OFFICE O/P NEW LOW 30 MIN: CPT | Performed by: SURGERY

## 2020-02-25 RX ORDER — CLINDAMYCIN HCL 300 MG
300 CAPSULE ORAL 3 TIMES DAILY
Qty: 42 CAPSULE | Refills: 0 | Status: SHIPPED | OUTPATIENT
Start: 2020-02-25 | End: 2020-03-31

## 2020-02-25 ASSESSMENT — MIFFLIN-ST. JEOR: SCORE: 1693.36

## 2020-02-25 ASSESSMENT — PAIN SCALES - GENERAL: PAINLEVEL: NO PAIN (0)

## 2020-02-25 NOTE — PROGRESS NOTES
.Please place or confirm pending lab orders for upcoming lab appointment on 2/25/20  Thank you,  Lupe

## 2020-02-25 NOTE — TELEPHONE ENCOUNTER
Reason for Call:  Other prescription    Detailed comments: Patient stated her [facility] does not have the script you were to prescribe for clindamycin. Please call patient to advise when script has been called in.    Phone Number Patient can be reached at: Cell number on file:    Telephone Information:   Mobile [phone]       Best Time: any    Can we leave a detailed message on this number? YES    Call taken on 2/25/2020 at 3:31 PM by Rubia Messina

## 2020-02-25 NOTE — LETTER
"    2/25/2020         RE: Saritha Pearson  [address]  [place] [facility] [address]        Dear Colleague,    Thank you for referring your patient, Saritha Pearson, to the [facility]. Please see a copy of my visit note below.    Dear Lalo Segovia  I was asked to see this patient by Lalo Gold for please see below.  I have seen Saritha Pearson and as you know his chief complaint is had an infected cyst in the right axilla that was lanced and has another cyst on her left neck.  She wants them both out.   They both bother her.   Was placed on antibiotics last dose was 4 days ago.      HPI:  Patient is a 54 year old female  with complaints see above  The patient noticed the symptoms about 1.5 months ago.    Patient has not family history of this problems  draining makes the episode better.    Had a mammogram and ultrasound of both breast done.     Review Of Systems  Respiratory: No shortness of breath, dyspnea on exertion, cough, or hemoptysis  Cardiovascular: negative  Gastrointestinal: constipation  Endocrine: negative  :  negative    10 Point review of systems all others are negative.   /82   Pulse 85   Resp 16   Ht 1.969 m (6' 5.5\")   Wt 88.9 kg (196 lb)   SpO2 98%   BMI 22.94 kg/m       Past Medical History:   Diagnosis Date     Asthma     asthma sx after resp infections     Constipation      Gastro-oesophageal reflux disease      PONV (postoperative nausea and vomiting)      Vaginal high risk HPV DNA test positive 2016, 2018, 2019    see problem list       Past Surgical History:   Procedure Laterality Date     ABDOMEN SURGERY      abdominal wall mass     ABDOMINOPLASTY      pt has post op infection     COSMETIC SURGERY       EXCISE LESION TRUNK  10/4/2011    Procedure:EXCISE LESION TRUNK; Excision Abdominal Wall Mass ; Surgeon:CARLITO BETTS; Location:[facility] OR     GYN SURGERY       HYSTERECTOMY       HYSTERECTOMY, PAP NO LONGER INDICATED  11/02/2018       Social History "     Socioeconomic History     Marital status: Single     Spouse name: Not on file     Number of children: Not on file     Years of education: Not on file     Highest education level: Not on file   Occupational History     Not on file   Social Needs     Financial resource strain: Not on file     Food insecurity:     Worry: Not on file     Inability: Not on file     Transportation needs:     Medical: Not on file     Non-medical: Not on file   Tobacco Use     Smoking status: Never Smoker     Smokeless tobacco: Never Used   Substance and Sexual Activity     Alcohol use: Yes     Comment: occasionally     Drug use: No     Sexual activity: Yes   Lifestyle     Physical activity:     Days per week: Not on file     Minutes per session: Not on file     Stress: Not on file   Relationships     Social connections:     Talks on phone: Not on file     Gets together: Not on file     Attends Baptism service: Not on file     Active member of club or organization: Not on file     Attends meetings of clubs or organizations: Not on file     Relationship status: Not on file     Intimate partner violence:     Fear of current or ex partner: Not on file     Emotionally abused: Not on file     Physically abused: Not on file     Forced sexual activity: Not on file   Other Topics Concern     Parent/sibling w/ CABG, MI or angioplasty before 65F 55M? Not Asked   Social History Narrative     Not on file       Current Outpatient Medications   Medication Sig Dispense Refill     albuterol (PROAIR HFA/PROVENTIL HFA/VENTOLIN HFA) 108 (90 BASE) MCG/ACT Inhaler Inhale 2 puffs into the lungs every 4 hours as needed for shortness of breath / dyspnea or wheezing 1 Inhaler 1     bisacodyl (DULCOLAX) 10 MG suppository Place 10 mg rectally daily as needed for constipation Reported on 3/24/2017 25 suppository 1     clindamycin (CLEOCIN) 300 MG capsule Take 1 capsule (300 mg) by mouth 3 times daily 21 capsule 0     doxycycline hyclate (VIBRAMYCIN) 100 MG  "capsule Take 1 capsule (100 mg) by mouth 2 times daily 14 capsule 0     fluticasone (FLONASE) 50 MCG/ACT nasal spray Spray 1 spray into both nostrils daily 9.9 mL 11     hydrocortisone (ANUSOL-HC) 25 MG suppository Place 1 suppository (25 mg) rectally 2 times daily 28 suppository 1     polyethylene glycol (MIRALAX) powder Take 1 capful by mouth daily Reported on 3/24/2017 510 g 1       Above was reviewed  Physical exam: /82   Pulse 85   Resp 16   Ht 1.969 m (6' 5.5\")   Wt 88.9 kg (196 lb)   SpO2 98%   BMI 22.94 kg/m      Patient able to get up on table without difficulty.   Patient is alert and orientated.   Head eyes, nose and mouth within normal limits.  No supraclavicular or cervical adenopathy palpated.  Thyroid within normal limits.  No carotid bruits auscultated.  Lungs are clear to auscultation  Heart is regular rate and rhythm with no murmur or thrills noted.  No costal vertebral angle tenderness noted.  Abdomen is abdomen is soft without significant tenderness, masses, organomegaly or guarding  bowel sounds are positive and no caput medusa noted.     Skin was warm and pink  Normal Affect      Lower extremity edema is not present.  Has a reactive lymph node about 0.75 cm  On the right axilla is a small well healed sebaceous cyst that does not look infected.   Has an old scar more anterior that was from some time ago.      Assessment: Has a reactive lymph node about 0.75 cm  On the right axilla is a small well healed sebaceous cyst that does not look infected.   Has an old scar more anterior that was from some time ago.     Plan to do will just watch the lymph node in the right neck.    Will excise the right axilla sebaceous cyst.   Patient is very worried about this and wants sedation.   So will do this in the OR with IV sedation or MAC    Risks of surgery include damage to nerves, bleeding, infection, damage to  Vessels, recurrence.     Will have her use clindamycin again  to take 7 days BEFORE " SURGERY AND THEN 7 DAYS AFTER SURGERY TO REMOVE IT.   If not getting better may have to just remove and leave open.  If comes back before than will have patient restart the antibiotics.   Plan to do set up a time in 2-3 weeks to excise the cyst.      Time spent with the patient with greater that 50% of the time in discussion was 30 minutes.  In discussing the options.      Elia Martinez MD      Again, thank you for allowing me to participate in the care of your patient.        Sincerely,        Elia Martinez MD

## 2020-02-25 NOTE — PROGRESS NOTES
"Dear Lalo Segovia  I was asked to see this patient by Lalo Gold for please see below.  I have seen Saritha Pearson and as you know his chief complaint is had an infected cyst in the right axilla that was lanced and has another cyst on her left neck.  She wants them both out.   They both bother her.   Was placed on antibiotics last dose was 4 days ago.      HPI:  Patient is a 54 year old female  with complaints see above  The patient noticed the symptoms about 1.5 months ago.    Patient has not family history of this problems  draining makes the episode better.    Had a mammogram and ultrasound of both breast done.     Review Of Systems  Respiratory: No shortness of breath, dyspnea on exertion, cough, or hemoptysis  Cardiovascular: negative  Gastrointestinal: constipation  Endocrine: negative  :  negative    10 Point review of systems all others are negative.   /82   Pulse 85   Resp 16   Ht 1.969 m (6' 5.5\")   Wt 88.9 kg (196 lb)   SpO2 98%   BMI 22.94 kg/m      Past Medical History:   Diagnosis Date     Asthma     asthma sx after resp infections     Constipation      Gastro-oesophageal reflux disease      PONV (postoperative nausea and vomiting)      Vaginal high risk HPV DNA test positive 2016, 2018, 2019    see problem list       Past Surgical History:   Procedure Laterality Date     ABDOMEN SURGERY      abdominal wall mass     ABDOMINOPLASTY      pt has post op infection     COSMETIC SURGERY       EXCISE LESION TRUNK  10/4/2011    Procedure:EXCISE LESION TRUNK; Excision Abdominal Wall Mass ; Surgeon:CARLITO BETTS; Location:[facility]     GYN SURGERY       HYSTERECTOMY       HYSTERECTOMY, PAP NO LONGER INDICATED  11/02/2018       Social History     Socioeconomic History     Marital status: Single     Spouse name: Not on file     Number of children: Not on file     Years of education: Not on file     Highest education level: Not on file   Occupational History     Not on file   Social " Needs     Financial resource strain: Not on file     Food insecurity:     Worry: Not on file     Inability: Not on file     Transportation needs:     Medical: Not on file     Non-medical: Not on file   Tobacco Use     Smoking status: Never Smoker     Smokeless tobacco: Never Used   Substance and Sexual Activity     Alcohol use: Yes     Comment: occasionally     Drug use: No     Sexual activity: Yes   Lifestyle     Physical activity:     Days per week: Not on file     Minutes per session: Not on file     Stress: Not on file   Relationships     Social connections:     Talks on phone: Not on file     Gets together: Not on file     Attends [employer] service: Not on file     Active member of club or organization: Not on file     Attends meetings of clubs or organizations: Not on file     Relationship status: Not on file     Intimate partner violence:     Fear of current or ex partner: Not on file     Emotionally abused: Not on file     Physically abused: Not on file     Forced sexual activity: Not on file   Other Topics Concern     Parent/sibling w/ CABG, MI or angioplasty before 65F 55M? Not Asked   Social History Narrative     Not on file       Current Outpatient Medications   Medication Sig Dispense Refill     albuterol (PROAIR HFA/PROVENTIL HFA/VENTOLIN HFA) 108 (90 BASE) MCG/ACT Inhaler Inhale 2 puffs into the lungs every 4 hours as needed for shortness of breath / dyspnea or wheezing 1 Inhaler 1     bisacodyl (DULCOLAX) 10 MG suppository Place 10 mg rectally daily as needed for constipation Reported on 3/24/2017 25 suppository 1     clindamycin (CLEOCIN) 300 MG capsule Take 1 capsule (300 mg) by mouth 3 times daily 21 capsule 0     doxycycline hyclate (VIBRAMYCIN) 100 MG capsule Take 1 capsule (100 mg) by mouth 2 times daily 14 capsule 0     fluticasone (FLONASE) 50 MCG/ACT nasal spray Spray 1 spray into both nostrils daily 9.9 mL 11     hydrocortisone (ANUSOL-HC) 25 MG suppository Place 1 suppository (25 mg)  "rectally 2 times daily 28 suppository 1     polyethylene glycol (MIRALAX) powder Take 1 capful by mouth daily Reported on 3/24/2017 510 g 1       Above was reviewed  Physical exam: /82   Pulse 85   Resp 16   Ht 1.969 m (6' 5.5\")   Wt 88.9 kg (196 lb)   SpO2 98%   BMI 22.94 kg/m     Patient able to get up on table without difficulty.   Patient is alert and orientated.   Head eyes, nose and mouth within normal limits.  No supraclavicular or cervical adenopathy palpated.  Thyroid within normal limits.  No carotid bruits auscultated.  Lungs are clear to auscultation  Heart is regular rate and rhythm with no murmur or thrills noted.  No costal vertebral angle tenderness noted.  Abdomen is abdomen is soft without significant tenderness, masses, organomegaly or guarding  bowel sounds are positive and no caput medusa noted.     Skin was warm and pink  Normal Affect      Lower extremity edema is not present.  Has a reactive lymph node about 0.75 cm  On the right axilla is a small well healed sebaceous cyst that does not look infected.   Has an old scar more anterior that was from some time ago.      Assessment: Has a reactive lymph node about 0.75 cm  On the right axilla is a small well healed sebaceous cyst that does not look infected.   Has an old scar more anterior that was from some time ago.     Plan to do will just watch the lymph node in the right neck.    Will excise the right axilla sebaceous cyst.   Patient is very worried about this and wants sedation.   So will do this in the OR with IV sedation or MAC    Risks of surgery include damage to nerves, bleeding, infection, damage to  Vessels, recurrence.     Will have her use clindamycin again  to take 7 days BEFORE SURGERY AND THEN 7 DAYS AFTER SURGERY TO REMOVE IT.   If not getting better may have to just remove and leave open.  If comes back before than will have patient restart the antibiotics.   Plan to do set up a time in 2-3 weeks to excise the " cyst.      Time spent with the patient with greater that 50% of the time in discussion was 30 minutes.  In discussing the options.      Elia Martinez MD  March 16, 2020    Talked to patient and she is worried about the covid 19 and is doing well right now.   She feels she has a poor immune system.  She is going to cancel the surgery for this Wednesday, AND RE SCHEDULE FOR A FEW MONTHS From  NOW.  She still has 7 days of the anitbiotic so if it gets worse will have her restart it and will have her restart 7 days before and 7 days after to reduce the risk of infection in the cyst.   That she feels is very small now.

## 2020-02-25 NOTE — PROGRESS NOTES
Cbc was ordered by dr kong today. Is the other bloodwork pended in this encounter also needed? Please review.     Thank you,   Trini Castañeda MLT ([facility])

## 2020-02-25 NOTE — TELEPHONE ENCOUNTER
Type of surgery: excision of sebaceous cyst right axilla CPT code 54971  Sebaceous cyst [L72.3]  - Primary   Location of surgery: [facility]  Date and time of surgery: 3-18-20  TBD  Surgeon: Dr Martinez  Pre-Op Appt Date: 3-3-20  Post-Op Appt Date: 3-31-20   Packet sent out: Yes  Pre-cert/Authorization completed:  No prior auth per [facility] list.   Date: 02/25/2020    Insurance valid 03/02/2020

## 2020-02-25 NOTE — PATIENT INSTRUCTIONS
Assessment: Has a reactive lymph node about 0.75 cm  On the right axilla is a small well healed sebaceous cyst that does not look infected.   Has an old scar more anterior that was from some time ago.     Plan to do will just watch the lymph node in the right neck.    Will excise the right axilla sebaceous cyst.   Patient is very worried about this and wants sedation.   So will do this in the OR with IV sedation or MAC    Risks of surgery include damage to nerves, bleeding, infection, damage to  Vessels, recurrence.     Will have her use clindamycin again  to take 7 days BEFORE SURGERY AND THEN 7 DAYS AFTER SURGERY TO REMOVE IT.   If not getting better may have to just remove and leave open.  If comes back before than will have patient restart the antibiotics.   Plan to do set up a time in 2-3 weeks to excise the cyst.        SKIN AND SUBCUTANEOUS SURGERY DISCHARGE INSTRUCTIONS  DR. JESUS CHARLES    1. You may resume your regular diet when you feel you are ready to. DO NOT drink alcoholic beverages for 24 hours or while you are taking prescription medication.    2. Limit your activities for the first 48 hours. Gradually, increase them as tolerated. You may use stairs. I encourage you to walk as tolerated.     3. You will have some discomfort at the incision sites. This is expected. This should improve over the next 2-3 days. Ice and pain medication will help with this pain. Use prescribed pain medication as instructed.    4. Bruising and mild swelling is normal after surgery. The area below and around the incision(s) will be hard and elevated. This is normal. I call it the healing ridge. This will resolve slowly over the next several months. If you feel the pain is increasing and cannot explain it by increasing activity please call us at [phone].    5. The dressing will often have some blood on it. You may shower 24 hours after surgery. Clean gently over incision site. If clear plastic covering or  steri-strip comes off and there is still some bleeding or drainage then cover with gauze or band-aid. If no bleeding, there is no reason to cover site. If you were given an abdominal binder at time of surgery it may be removed after 24 hours after surgery. You may continue to wear it however for comfort. I suggest  you wear an old t-shirt under the abdominal binder for a more comfortable wear.    6. Avoid Aspirin for the first 72 hours after the procedure. This medication may increase the tendency to bleed.    7. Use the following medications (in addition to your normal meds) as shown:  a. Percocet 5 mg 1-2 every 6 hours as needed for severe pain. This contains 325 mg of Tylenol (acetaminophen) per tablet.  Please do not take more than 4 grams of Tylenol (acetaminophen) per day. For example, you may take 1 Percocet and 1 Tylenol, or 2 Percocet and no Tylenol, or 2 Tylenol and no Percocet every 6 hours.  b. Tylenol (acetaminophen) 500 mg every 6 hours as needed for mild pain. Do not take more than 1000 mg every 6 hours. (see above).  c. Motrin (ibuprofen) 200-800 mg every 6 hours as needed for mild to moderate pain. Take with food.     8. Notify Dr. Martinez's clinic at [phone] if:    Your discomfort is not relieved by your pain medication.    You have signs of infection such as temperature above 100.5 degrees orally, chills, or increasing daily discomfort.    Incision site is becoming more red and/or there is purulent drainage.    You have questions or concerns.    9. Please call [phone] to schedule a follow up appointment in about 2 weeks.  If you have not already been given one.     10. When taking narcotics (pain medication more than Tylenol [acetaminophen] and Motrin [ibuprofen]) it is important to keep your stools soft to avoid constipation and pain with straining. This is best done by drinking fluids (non-alcoholic and non-caffeinated) and taking a stool softener (i.e. Metamucil or milk of  magnesia). You may be able to use non-narcotics for pain relief especially by the 3rd post- operative day. Tylenol (acetaminophen) 500 mg every 6 hours and/or Motrin (ibuprofen) 200-800 mg every 6 hours. Please do not take more than 4 grams of Tylenol (acetaminophen) per day. Remember your Percocet does have Tylenol (acetaminophen) already in it. Please take Motrin (ibuprofen) with food to help protect the stomach. If you have a history of stomach ulcers or stomach problems, do not take Motrin (ibuprofen).     11. Do not drive or operate heavy machinery for 24 hours after surgery or when taking narcotics. You may resume driving when feel that you can safely avoid an accident and are not taking narcotics. This is usually 5 to 7 days after surgery. You should not be alone for 24 hours after surgery.    12. Have milk of magnesia available at home so that when you take the pain medications you take 1-2 teaspoons a day,  to help reduce problems with constipation.

## 2020-02-25 NOTE — PROGRESS NOTES
Before we obtained orders from you for this patient, we ran a CBCD on our analyzer due to sample stability. This is [place] protocol. Upon running the CBC, the analyzer flagged us to do a smear review and we encountered abnormal results. Please review and consider ordering a Future CBCD so we can document our findings in Epic.     Thank you, Francisca Agustin MLT

## 2020-02-26 LAB
ANION GAP SERPL CALCULATED.3IONS-SCNC: 6 MMOL/L (ref 3–14)
BUN SERPL-MCNC: 12 MG/DL (ref 7–30)
CALCIUM SERPL-MCNC: 10.4 MG/DL (ref 8.5–10.1)
CHLORIDE SERPL-SCNC: 105 MMOL/L (ref 94–109)
CHOLEST SERPL-MCNC: 160 MG/DL
CO2 SERPL-SCNC: 29 MMOL/L (ref 20–32)
CREAT SERPL-MCNC: 0.8 MG/DL (ref 0.52–1.04)
GFR SERPL CREATININE-BSD FRML MDRD: 84 ML/MIN/{1.73_M2}
GLUCOSE SERPL-MCNC: 94 MG/DL (ref 70–99)
HDLC SERPL-MCNC: 76 MG/DL
LDLC SERPL CALC-MCNC: 69 MG/DL
NONHDLC SERPL-MCNC: 84 MG/DL
POTASSIUM SERPL-SCNC: 3.9 MMOL/L (ref 3.4–5.3)
SODIUM SERPL-SCNC: 140 MMOL/L (ref 133–144)
TRIGL SERPL-MCNC: 74 MG/DL

## 2020-02-28 NOTE — PATIENT INSTRUCTIONS
1. Do not take Fish Oil, NSAID's like Aspirin, Ibuprofen, Naproxen etc, one week prior to your surgery.     2. Continue your other medications but on the day of surgery use only a sip of water to take these.    3. See you in October for a physical with fasting labs.

## 2020-02-28 NOTE — PROGRESS NOTES
[facility]  [id number] [address] include 234 beds at the [facility] [address] [phone]  Dept: [phone]    PRE-OP EVALUATION:  Today's date: 3/3/2020    Saritha Pearson (: 1965) presents for pre-operative evaluation assessment as requested by Elia Martinez MD.  She requires evaluation and anesthesia risk assessment prior to undergoing surgery/procedure for treatment of excsion of sebaceous cyst right axilla. .    Fax number for surgical facility: [phone]  Primary Physician: Lalo Gold  Type of Anesthesia Anticipated: to be determined    Patient has a Health Care Directive or Living Will:  NO    Preop Questions 3/3/2020   Who is doing your surgery? Dr. Martinez.   What are you having done? physical   Date of Surgery/Procedure:    Facility or Hospital where procedure/surgery will be performed: [facility]    1.  Do you have a history of Heart attack, stroke, stent, coronary bypass surgery, or other heart surgery? No   2.  Do you ever have any pain or discomfort in your chest? No   3.  Do you have a history of  Heart Failure? No   4.   Are you troubled by shortness of breath when:  walking on a level surface, or up a slight hill, or at night? No   5.  Do you currently have a cold, bronchitis or other respiratory infection? No   6.  Do you have a cough, shortness of breath, or wheezing? No   7.  Do you sometimes get pains in the calves of your legs when you walk? No   8. Do you or anyone in your family have previous history of blood clots? No   9.  Do you or does anyone in your family have a serious bleeding problem such as prolonged bleeding following surgeries or cuts? No   10. Have you ever had problems with anemia or been told to take iron pills? No   11. Have you had any abnormal blood loss such as black, tarry or bloody stools, or abnormal vaginal bleeding? No   12. Have you ever had a blood transfusion? No   13. Have you or any of your relatives ever had problems with  anesthesia? No   14. Do you have sleep apnea, excessive snoring or daytime drowsiness? No   15. Do you have any prosthetic heart valves? No   16. Do you have prosthetic joints? No   17. Is there any chance that you may be pregnant? No         HPI:     Skin lumps - symptoms present since around early Jan 2020. Located on right axilla. There was swelling, redness, pain. Denies fevers or chills. No discharge. There is also non inflamed lump on the left side of the neck.   Evaluation and treatment:    On 1/24/20 I placed her on Doxy. This did not help.   I&D performed 2/4/20 with packing gauze and placed her on Clinda.   No further infection.   She is scheduled for excision in the OR on 3/18/20.  No contraindications to surgery - may proceed without further evaluation.    Constipation and chronic abd pain - more recently (about one month) she has had abd pain to the left of the umbilicus. She feels bloated. She has BM's every 2 days that are hard to evacuate. No blood in stool or black stools. No fevers. She has some urinary frequency with difficulty emptying the bladder. This is baseline for her. No dysuria or hematuria.  Evaluation and treatment:    She has had abdominal surgeries, including hernia surgery in the pat - I suspect adhesions.   Miralax bid helps. I asked her to increase to tid or qid until daily BM's that are soft.    She follows with GI - I got a CT report done at [facility] dated 9/25/19 - showed 1.6 right adnexal cyst and otherwise was unremarkable.   She also had recent colonoscopy - polyp was resected.   Pelvic U/S 10/8/19 - 1.7 cm cyst right ovary - I told her I did not think this was causing her pain and does not require follow-up unless symptoms change.   U/A 10/4/19 unremarkable.   I asked her to eat daily fresh fruits and veggies.   I previously asked her to try Metamucil twice per day.     CT ABDOMEN AND PELVIS WITH CONTRAST 1/27/2016 2:50 PM        IMPRESSION:  1. Fluid distention of the colon  without obvious obstructing mass or  debris. Oral contrast reaches the right colon and the small bowel is  nondistended. No evidence of obstruction or diverticulitis. Appendix  is normal.  2. Evidence of old granulomatous disease.      Recurrent maxillary sinusitis -   Evaluation and treatment:    Augmentin is used when symptoms occur.    Overweight - Diet and exercise discussed.    Wt Readings from Last 5 Encounters:   03/03/20 86.2 kg (190 lb)   02/25/20 88.9 kg (196 lb)   02/06/20 86.6 kg (191 lb)   01/24/20 85.7 kg (189 lb)   10/15/19 85.3 kg (188 lb)     Body mass index is 22.24 kg/m .    Surgical history:   Multiple cosmetic surgeries   Hysterectomy - supracervical?   Right knee arthroscopic surgery    Preventive:  She declines flu shot. She declines Shingles vaccine.    Immunization History   Administered Date(s) Administered     TDAP Vaccine (Adacel) 11/02/2018     Tdap (Adacel,Boostrix) 12/03/2008       Lipids screen:    Recent Labs   Lab Test 07/27/17  1432 04/26/16  1157   CHOL 168 147   HDL 65 64   LDL 87 66   TRIG 81 85     STD screen: collected today.    Anemia screen:     CBC RESULTS:   Recent Labs   Lab Test 04/22/18  0451   WBC 6.7   RBC 4.68   HGB 12.5   HCT 37.7   MCV 81   MCH 26.7   MCHC 33.2   RDW 13.2            Diabetes screen:     Last Comprehensive Metabolic Panel:  Sodium   Date Value Ref Range Status   02/25/2020 140 133 - 144 mmol/L Final     Potassium   Date Value Ref Range Status   02/25/2020 3.9 3.4 - 5.3 mmol/L Final     Chloride   Date Value Ref Range Status   02/25/2020 105 94 - 109 mmol/L Final     Carbon Dioxide   Date Value Ref Range Status   02/25/2020 29 20 - 32 mmol/L Final     Anion Gap   Date Value Ref Range Status   02/25/2020 6 3 - 14 mmol/L Final     Glucose   Date Value Ref Range Status   02/25/2020 94 70 - 99 mg/dL Final     Comment:     Fasting specimen     Urea Nitrogen   Date Value Ref Range Status   02/25/2020 12 7 - 30 mg/dL Final     Creatinine   Date Value  Ref Range Status   02/25/2020 0.80 0.52 - 1.04 mg/dL Final     GFR Estimate   Date Value Ref Range Status   02/25/2020 84 >60 mL/min/[1.73_m2] Final     Comment:     Non  GFR Calc  Starting 12/18/2018, serum creatinine based estimated GFR (eGFR) will be   calculated using the Chronic Kidney Disease Epidemiology Collaboration   (CKD-EPI) equation.       Calcium   Date Value Ref Range Status   02/25/2020 10.4 (H) 8.5 - 10.1 mg/dL Final     Mammogram: May 2018 - was told it was ok after doing additional imaging. Reordered.    Contraception: n/a due to hysterectomy.    PAP - hysterectomy - review of records via [facility] Everywhere says hysterectomy - pathology says there was cervix. On exam I don't see cervix but she says her surgeon (Dr. Chambers) told her the cervix was left behind - I previously collected a vaginal cuff PAP and HPV. But I told her she does not need this any more. But she can also make an appointment with Dr. Chambers to clarify the issue.     Lab Results   Component Value Date    PAP NIL 10/15/2019     HPV was positive.    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  Exercise: treadmill 3 times per week  Tobacco: no  Etoh: 1-2 per week  Recreational drugs: no  Caffeine: coffee 1 per week      MEDICAL HISTORY:     Patient Active Problem List    Diagnosis Date Noted     Sebaceous cyst 02/25/2020     Priority: Medium     Added automatically from request for surgery [id number]       Other postprocedural status(V45.89) 07/21/2016     Priority: Medium     Right knee pain 04/28/2016     Priority: Medium     Overweight 12/07/2015     Priority: Medium     Constipation 05/18/2015     Priority: Medium      Past Medical History:   Diagnosis Date     Asthma     asthma sx after resp infections     Constipation      Gastro-oesophageal reflux disease      PONV (postoperative nausea and vomiting)      Vaginal high risk HPV DNA test positive 2016, 2018, 2019    see problem  list     Past Surgical History:   Procedure Laterality Date     ABDOMEN SURGERY      abdominal wall mass     ABDOMINOPLASTY      pt has post op infection     COSMETIC SURGERY       EXCISE LESION TRUNK  10/4/2011    Procedure:EXCISE LESION TRUNK; Excision Abdominal Wall Mass ; Surgeon:CARLITO BETTS; Location:[facility] OR     GYN SURGERY       HYSTERECTOMY       HYSTERECTOMY, PAP NO LONGER INDICATED  11/02/2018     Current Outpatient Medications   Medication Sig Dispense Refill     albuterol (PROAIR HFA/PROVENTIL HFA/VENTOLIN HFA) 108 (90 BASE) MCG/ACT Inhaler Inhale 2 puffs into the lungs every 4 hours as needed for shortness of breath / dyspnea or wheezing 1 Inhaler 1     bisacodyl (DULCOLAX) 10 MG suppository Place 10 mg rectally daily as needed for constipation Reported on 3/24/2017 25 suppository 1     clindamycin (CLEOCIN) 300 MG capsule Take 1 capsule (300 mg) by mouth 3 times daily 42 capsule 0     clindamycin (CLEOCIN) 300 MG capsule Take 1 capsule (300 mg) by mouth 3 times daily 21 capsule 0     doxycycline hyclate (VIBRAMYCIN) 100 MG capsule Take 1 capsule (100 mg) by mouth 2 times daily 14 capsule 0     fluticasone (FLONASE) 50 MCG/ACT nasal spray Spray 1 spray into both nostrils daily 9.9 mL 11     hydrocortisone (ANUSOL-HC) 25 MG suppository Place 1 suppository (25 mg) rectally 2 times daily 28 suppository 1     polyethylene glycol (MIRALAX) powder Take 1 capful by mouth daily Reported on 3/24/2017 510 g 1     OTC products: as noted above    Allergies   Allergen Reactions     Sulfa Drugs Hives, Itching and Rash      Latex Allergy: NO    Social History     Tobacco Use     Smoking status: Never Smoker     Smokeless tobacco: Never Used   Substance Use Topics     Alcohol use: Yes     Comment: occasionally     History   Drug Use No       REVIEW OF SYSTEMS:   CONSTITUTIONAL: NEGATIVE for fever, chills, change in weight  INTEGUMENTARY/SKIN: NEGATIVE for worrisome rashes, moles or lesions  EYES: NEGATIVE for  vision changes or irritation  ENT/MOUTH: NEGATIVE for ear, mouth and throat problems  RESP: NEGATIVE for significant cough or SOB  BREAST: NEGATIVE for masses, tenderness or discharge  CV: NEGATIVE for chest pain, palpitations or peripheral edema  GI: NEGATIVE for nausea, abdominal pain, heartburn, or change in bowel habits  : NEGATIVE for frequency, dysuria, or hematuria  MUSCULOSKELETAL: NEGATIVE for significant arthralgias or myalgia  NEURO: NEGATIVE for weakness, dizziness or paresthesias  ENDOCRINE: NEGATIVE for temperature intolerance, skin/hair changes  HEME: NEGATIVE for bleeding problems  PSYCHIATRIC: NEGATIVE for changes in mood or affect    EXAM:   /78 (Cuff Size: Adult Regular)   Pulse 67   Temp 98.3  F (36.8  C) (Oral)   Wt 86.2 kg (190 lb)   SpO2 99%   Breastfeeding No   BMI 22.24 kg/m      GENERAL APPEARANCE: healthy, alert and no distress     EYES: EOMI, PERRL     HENT: ear canals and TM's normal and nose and mouth without ulcers or lesions     NECK: left side of neck with subcutaneous lump mobile, non tender.     RESP: lungs clear to auscultation - no rales, rhonchi or wheezes     CV: regular rates and rhythm, normal S1 S2, no S3 or S4 and no murmur, click or rub     ABDOMEN:  soft, nontender, no HSM or masses and bowel sounds normal     MS: extremities normal- no gross deformities noted, no evidence of inflammation in joints, FROM in all extremities.     SKIN: no suspicious lesions or rashes     NEURO: Normal strength and tone, sensory exam grossly normal, mentation intact and speech normal     PSYCH: mentation appears normal. and affect normal/bright     LYMPHATICS: No cervical adenopathy    DIAGNOSTICS:     EKG today: sinus dilia at 54 and otherwise normal.    Recent Labs   Lab Test 02/25/20  1139 11/02/18  1244 04/22/18  0451   HGB 12.7  --  12.5     --  330    141 140   POTASSIUM 3.9 3.7 3.4   CR 0.80 0.66 0.78   A1C  --  5.6  --         IMPRESSION:   Reason for  surgery/procedure: skin lumps    The proposed surgical procedure is considered LOW risk.    REVISED CARDIAC RISK INDEX  The patient has the following serious cardiovascular risks for perioperative complications such as (MI, PE, VFib and 3  AV Block):  No serious cardiac risks  INTERPRETATION: 0 risks: Class I (very low risk - 0.4% complication rate)    The patient has the following additional risks for perioperative complications:  No identified additional risks      RECOMMENDATIONS:     Assessment and Plan - Decision Making    1. Preop general physical exam    No contraindications to surgery - may proceed without further evaluation.    - EKG 12-lead complete w/read - Clinics    2. Skin lumps    No contraindications to surgery - may proceed without further evaluation.        Written instructions given as follows:    Patient Instructions   1. Do not take Fish Oil, NSAID's like Aspirin, Ibuprofen, Naproxen etc, one week prior to your surgery.     2. Continue your other medications but on the day of surgery use only a sip of water to take these.    3. See you in October for a physical with fasting labs.              Signed Electronically by: BENNETT POLLARD MD    Copy of this evaluation report is provided to requesting physician.    [facility] Preop Guidelines    Revised Cardiac Risk Index

## 2020-03-03 ENCOUNTER — OFFICE VISIT (OUTPATIENT)
Dept: FAMILY MEDICINE | Facility: CLINIC | Age: 55
End: 2020-03-03
Payer: COMMERCIAL

## 2020-03-03 VITALS
TEMPERATURE: 98.3 F | BODY MASS INDEX: 22.24 KG/M2 | DIASTOLIC BLOOD PRESSURE: 78 MMHG | WEIGHT: 190 LBS | SYSTOLIC BLOOD PRESSURE: 121 MMHG | HEART RATE: 67 BPM | OXYGEN SATURATION: 99 %

## 2020-03-03 DIAGNOSIS — Z01.818 PREOP GENERAL PHYSICAL EXAM: Primary | ICD-10-CM

## 2020-03-03 DIAGNOSIS — R22.9 SKIN LUMPS: ICD-10-CM

## 2020-03-03 PROCEDURE — 93000 ELECTROCARDIOGRAM COMPLETE: CPT | Performed by: FAMILY MEDICINE

## 2020-03-03 PROCEDURE — 99214 OFFICE O/P EST MOD 30 MIN: CPT | Performed by: FAMILY MEDICINE

## 2020-03-11 NOTE — OR NURSING
Pt contacted for Anny for procedure 3/18/20. Pt felt like the cyst she was going to have removed is gone. She wasn't sure she needed the surgery. Advised her to contact Dr. Martinez's office to let them know and get in for a recheck to see if surgery needed. She was going to call his clinic today.

## 2020-03-13 ENCOUNTER — TELEPHONE (OUTPATIENT)
Dept: FAMILY MEDICINE | Facility: CLINIC | Age: 55
End: 2020-03-13

## 2020-03-13 NOTE — TELEPHONE ENCOUNTER
Reason for Call:  Other cyst    Detailed comments: patient is calling to inform provider that her cyst has disappeared believes she doesn't need to have the surgery now.   Please call to discuss  Thank you     Phone Number Patient can be reached at: Home number on file [phone] (home)    Best Time: any    Can we leave a detailed message on this number? YES    Call taken on 3/13/2020 at 7:49 AM by Georgia Fernando

## 2020-03-16 RX ORDER — CLINDAMYCIN HCL 300 MG
300 CAPSULE ORAL 3 TIMES DAILY
Qty: 42 CAPSULE | Refills: 0 | Status: SHIPPED | OUTPATIENT
Start: 2020-03-16 | End: 2020-03-31

## 2020-03-16 NOTE — TELEPHONE ENCOUNTER
Patient states that her bump has gone down and will wait to see if it goes away before rescheduling.

## 2020-03-27 ENCOUNTER — MYC MEDICAL ADVICE (OUTPATIENT)
Dept: FAMILY MEDICINE | Facility: CLINIC | Age: 55
End: 2020-03-27

## 2020-03-31 ENCOUNTER — VIRTUAL VISIT (OUTPATIENT)
Dept: FAMILY MEDICINE | Facility: CLINIC | Age: 55
End: 2020-03-31
Payer: COMMERCIAL

## 2020-03-31 DIAGNOSIS — J01.01 ACUTE RECURRENT MAXILLARY SINUSITIS: ICD-10-CM

## 2020-03-31 PROCEDURE — 99213 OFFICE O/P EST LOW 20 MIN: CPT | Mod: 95 | Performed by: FAMILY MEDICINE

## 2020-03-31 NOTE — PROGRESS NOTES
"Subjective     Saritha Pearson is a 54 year old female who is being evaluated via a billable telephone visit.      The patient has been notified of following:     \"This telephone visit will be conducted via a call between you and your physician/provider. We have found that certain health care needs can be provided without the need for a physical exam.  This service lets us provide the care you need with a short phone conversation.  If a prescription is necessary we can send it directly to your pharmacy.  If lab work is needed we can place an order for that and you can then stop by our lab to have the test done at a later time.    If during the course of the call the physician/provider feels a telephone visit is not appropriate, you will not be charged for this service.\"     Patient has given verbal consent for Telephone visit?  Yes    Saritha Pearson complains of     Chief Complaint   Patient presents with     Sinus Problem     possible sinus infection per pt x       ALLERGIES  Sulfa drugs    Recurrent maxillary sinusitis - this episode started 3 weeks ago. Symptoms are sinus congestion and pain on one side. Nasal discharge. No fever.   Evaluation and treatment:    Augmentin is used when symptoms occur. I refilled that for her.   I suggested seeing ENT once things settle down with this COVID19 pandemic.    The following reviewed but not addressed today:    Skin lumps - symptoms present since around early Jan 2020. Located on right axilla. There was swelling, redness, pain. Denies fevers or chills. No discharge. There is also non inflamed lump on the left side of the neck.   Evaluation and treatment:    On 1/24/20 I placed her on Doxy. This did not help.   I&D performed 2/4/20 with packing gauze and placed her on Clinda.   She was scheduled for excision in the OR on 3/18/20 but this got postponed due to COVID19 pandemic.    Constipation and chronic abd pain - more recently (about one month) she has had abd pain to the " left of the umbilicus. She feels bloated. She has BM's every 2 days that are hard to evacuate. No blood in stool or black stools. No fevers. She has some urinary frequency with difficulty emptying the bladder. This is baseline for her. No dysuria or hematuria.  Evaluation and treatment:    She has had abdominal surgeries, including hernia surgery in the pat - I suspect adhesions.   Miralax bid helps. I asked her to increase to tid or qid until daily BM's that are soft.    She follows with GI - I got a CT report done at [facility] dated 9/25/19 - showed 1.6 right adnexal cyst and otherwise was unremarkable.   She also had recent colonoscopy - polyp was resected.   Pelvic U/S 10/8/19 - 1.7 cm cyst right ovary - I told her I did not think this was causing her pain and does not require follow-up unless symptoms change.   U/A 10/4/19 unremarkable.   I asked her to eat daily fresh fruits and veggies.   I previously asked her to try Metamucil twice per day.     CT ABDOMEN AND PELVIS WITH CONTRAST 1/27/2016 2:50 PM        IMPRESSION:  1. Fluid distention of the colon without obvious obstructing mass or  debris. Oral contrast reaches the right colon and the small bowel is  nondistended. No evidence of obstruction or diverticulitis. Appendix  is normal.  2. Evidence of old granulomatous disease.    Overweight - Diet and exercise discussed.    Wt Readings from Last 5 Encounters:   03/03/20 86.2 kg (190 lb)   02/25/20 88.9 kg (196 lb)   02/06/20 86.6 kg (191 lb)   01/24/20 85.7 kg (189 lb)   10/15/19 85.3 kg (188 lb)     There is no height or weight on file to calculate BMI.    Surgical history:   Multiple cosmetic surgeries   Hysterectomy - supracervical?   Right knee arthroscopic surgery    Preventive:  She declines flu shot. She declines Shingles vaccine.    Immunization History   Administered Date(s) Administered     HepB-Adult 12/09/2008, 02/04/2009     Influenza (IIV3) PF 12/09/2008     TDAP Vaccine (Adacel) 11/02/2018      Tdap (Adacel,Boostrix) 12/03/2008       Lipids screen:    Recent Labs   Lab Test 07/27/17  1432 04/26/16  1157   CHOL 168 147   HDL 65 64   LDL 87 66   TRIG 81 85     STD screen: collected today.    Anemia screen:     CBC RESULTS:   Recent Labs   Lab Test 04/22/18  0451   WBC 6.7   RBC 4.68   HGB 12.5   HCT 37.7   MCV 81   MCH 26.7   MCHC 33.2   RDW 13.2            Diabetes screen:     Last Comprehensive Metabolic Panel:  Sodium   Date Value Ref Range Status   02/25/2020 140 133 - 144 mmol/L Final     Potassium   Date Value Ref Range Status   02/25/2020 3.9 3.4 - 5.3 mmol/L Final     Chloride   Date Value Ref Range Status   02/25/2020 105 94 - 109 mmol/L Final     Carbon Dioxide   Date Value Ref Range Status   02/25/2020 29 20 - 32 mmol/L Final     Anion Gap   Date Value Ref Range Status   02/25/2020 6 3 - 14 mmol/L Final     Glucose   Date Value Ref Range Status   02/25/2020 94 70 - 99 mg/dL Final     Comment:     Fasting specimen     Urea Nitrogen   Date Value Ref Range Status   02/25/2020 12 7 - 30 mg/dL Final     Creatinine   Date Value Ref Range Status   02/25/2020 0.80 0.52 - 1.04 mg/dL Final     GFR Estimate   Date Value Ref Range Status   02/25/2020 84 >60 mL/min/[1.73_m2] Final     Comment:     Non  GFR Calc  Starting 12/18/2018, serum creatinine based estimated GFR (eGFR) will be   calculated using the Chronic Kidney Disease Epidemiology Collaboration   (CKD-EPI) equation.       Calcium   Date Value Ref Range Status   02/25/2020 10.4 (H) 8.5 - 10.1 mg/dL Final     Mammogram: May 2018 - was told it was ok after doing additional imaging. Reordered.    Contraception: n/a due to hysterectomy.    PAP - hysterectomy - review of records via [facility] Everywhere says hysterectomy - pathology says there was cervix. On exam I don't see cervix but she says her surgeon (Dr. Chambers) told her the cervix was left behind - I previously collected a vaginal cuff PAP and HPV. But I told her she does not  need this any more. But she can also make an appointment with Dr. Chambers to clarify the issue.     Lab Results   Component Value Date    PAP NIL 10/15/2019     HPV was positive.    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    Gen: sounded healthy on the phone.     Assessment and Plan - Decision Making    1. Acute recurrent maxillary sinusitis    Per HPI    - amoxicillin-clavulanate (AUGMENTIN) 875-125 MG tablet; Take 1 tablet by mouth 2 times daily  Dispense: 14 tablet; Refill: 0    Total time on the phone and reviewing records: 12 min

## 2020-07-22 ENCOUNTER — MYC MEDICAL ADVICE (OUTPATIENT)
Dept: FAMILY MEDICINE | Facility: CLINIC | Age: 55
End: 2020-07-22

## 2020-07-22 ENCOUNTER — VIRTUAL VISIT (OUTPATIENT)
Dept: FAMILY MEDICINE | Facility: CLINIC | Age: 55
End: 2020-07-22
Payer: COMMERCIAL

## 2020-07-22 ENCOUNTER — OFFICE VISIT (OUTPATIENT)
Dept: URGENT CARE | Facility: URGENT CARE | Age: 55
End: 2020-07-22
Payer: COMMERCIAL

## 2020-07-22 VITALS
RESPIRATION RATE: 16 BRPM | SYSTOLIC BLOOD PRESSURE: 145 MMHG | DIASTOLIC BLOOD PRESSURE: 87 MMHG | TEMPERATURE: 97.6 F | WEIGHT: 198 LBS | BODY MASS INDEX: 23.18 KG/M2 | HEART RATE: 62 BPM | OXYGEN SATURATION: 97 %

## 2020-07-22 DIAGNOSIS — L03.114 CELLULITIS OF LEFT UPPER EXTREMITY: Primary | ICD-10-CM

## 2020-07-22 DIAGNOSIS — T63.441A BEE STING REACTION, ACCIDENTAL OR UNINTENTIONAL, INITIAL ENCOUNTER: Primary | ICD-10-CM

## 2020-07-22 DIAGNOSIS — R03.0 ELEVATED BP WITHOUT DIAGNOSIS OF HYPERTENSION: ICD-10-CM

## 2020-07-22 DIAGNOSIS — L03.114 LEFT ARM CELLULITIS: ICD-10-CM

## 2020-07-22 DIAGNOSIS — T63.451A HORNET STING, ACCIDENTAL OR UNINTENTIONAL, INITIAL ENCOUNTER: ICD-10-CM

## 2020-07-22 PROCEDURE — 99213 OFFICE O/P EST LOW 20 MIN: CPT | Mod: 95 | Performed by: NURSE PRACTITIONER

## 2020-07-22 PROCEDURE — 99213 OFFICE O/P EST LOW 20 MIN: CPT | Performed by: INTERNAL MEDICINE

## 2020-07-22 RX ORDER — CEPHALEXIN 500 MG/1
500 CAPSULE ORAL 4 TIMES DAILY
Qty: 28 CAPSULE | Refills: 0 | Status: SHIPPED | OUTPATIENT
Start: 2020-07-22 | End: 2020-07-29

## 2020-07-22 RX ORDER — CEPHALEXIN 500 MG/1
500 CAPSULE ORAL 4 TIMES DAILY
Qty: 40 CAPSULE | Refills: 0 | Status: SHIPPED | OUTPATIENT
Start: 2020-07-22 | End: 2020-08-01

## 2020-07-22 RX ORDER — PREDNISONE 20 MG/1
20 TABLET ORAL 2 TIMES DAILY
Qty: 10 TABLET | Refills: 0 | Status: SHIPPED | OUTPATIENT
Start: 2020-07-22 | End: 2020-07-27

## 2020-07-22 ASSESSMENT — ENCOUNTER SYMPTOMS
CHILLS: 0
FEVER: 0
COLOR CHANGE: 1

## 2020-07-22 NOTE — PROGRESS NOTES
"Saritha Pearson is a 54 year old female who is being evaluated via a billable video visit.      The patient has been notified of following:     \"This video visit will be conducted via a call between you and your physician/provider. We have found that certain health care needs can be provided without the need for an in-person physical exam.  This service lets us provide the care you need with a video conversation.  If a prescription is necessary we can send it directly to your pharmacy.  If lab work is needed we can place an order for that and you can then stop by our lab to have the test done at a later time.    Video visits are billed at different rates depending on your insurance coverage.  Please reach out to your insurance provider with any questions.    If during the course of the call the physician/provider feels a video visit is not appropriate, you will not be charged for this service.\"    Patient has given verbal consent for Video visit? Yes  How would you like to obtain your AVS? MyChart  If you are dropped from the video visit, the video invite should be resent to: Text to cell phone: [phone]  Will anyone else be joining your video visit? Her friend       Subjective     Saritha Pearson is a 54 year old female who presents today via video visit for the following health issues:    HPI  Two nights ago was stung by a bee on left arm.  Has been applying ice packs, Triple Antibiotic ointment, and Vinager/Water mixture.  Has also taken Benadryl.  Denies any known fever.      Video Start Time: 4:01      Patient Active Problem List   Diagnosis     Constipation     Overweight     Right knee pain     Other postprocedural status(V45.89)     Sebaceous cyst     Past Surgical History:   Procedure Laterality Date     ABDOMEN SURGERY      abdominal wall mass     ABDOMINOPLASTY      pt has post op infection     COSMETIC SURGERY       EXCISE LESION TRUNK  10/4/2011    Procedure:EXCISE LESION TRUNK; Excision Abdominal " Wall Mass ; Surgeon:CARLITO BETTS; Location:[facility] OR     GYN SURGERY       HYSTERECTOMY       HYSTERECTOMY, PAP NO LONGER INDICATED  11/02/2018       Social History     Tobacco Use     Smoking status: Never Smoker     Smokeless tobacco: Never Used   Substance Use Topics     Alcohol use: Yes     Comment: occasionally     Family History   Problem Relation Age of Onset     Cerebrovascular Disease Father 60     Cerebrovascular Disease Maternal Grandmother      Cancer - colorectal Maternal Grandfather 50     Cancer Paternal Grandmother      Cancer Paternal Grandfather      Cancer - colorectal Sister 40     Breast Cancer Paternal Aunt      Breast Cancer Paternal Aunt      Breast Cancer Paternal Aunt      Breast Cancer Paternal Aunt      Breast Cancer Paternal Aunt          Current Outpatient Medications   Medication Sig Dispense Refill     albuterol (PROAIR HFA/PROVENTIL HFA/VENTOLIN HFA) 108 (90 BASE) MCG/ACT Inhaler Inhale 2 puffs into the lungs every 4 hours as needed for shortness of breath / dyspnea or wheezing 1 Inhaler 1     cephALEXin (KEFLEX) 500 MG capsule Take 1 capsule (500 mg) by mouth 4 times daily for 7 days 28 capsule 0     predniSONE (DELTASONE) 20 MG tablet Take 1 tablet (20 mg) by mouth 2 times daily for 5 days 10 tablet 0     Allergies   Allergen Reactions     Sulfa Drugs Hives, Itching and Rash       Reviewed and updated as needed this visit by Provider         Review of Systems   Constitutional: Negative for chills and fever.   Skin: Positive for color change.            Objective             Physical Exam     GENERAL: Healthy, alert and no distress  EYES: Eyes grossly normal to inspection.  No discharge or erythema, or obvious scleral/conjunctival abnormalities.  RESP: No audible wheeze, cough, or visible cyanosis.  No visible retractions or increased work of breathing.    SKIN: Large are of erythema/inflammation - Left posterior arm  NEURO: Cranial nerves grossly intact.  Mentation and speech  appropriate for age.  PSYCH: Mentation appears normal, affect normal/bright, judgement and insight intact, normal speech and appearance well-groomed.      Diagnostic Test Results:  Labs reviewed in Epic        Assessment & Plan     1. Bee sting reaction, accidental or unintentional, initial encounter  - cephALEXin (KEFLEX) 500 MG capsule; Take 1 capsule (500 mg) by mouth 4 times daily for 7 days  Dispense: 28 capsule; Refill: 0  - predniSONE (DELTASONE) 20 MG tablet; Take 1 tablet (20 mg) by mouth 2 times daily for 5 days  Dispense: 10 tablet; Refill: 0    2. Left arm cellulitis  - cephALEXin (KEFLEX) 500 MG capsule; Take 1 capsule (500 mg) by mouth 4 times daily for 7 days  Dispense: 28 capsule; Refill: 0  - predniSONE (DELTASONE) 20 MG tablet; Take 1 tablet (20 mg) by mouth 2 times daily for 5 days  Dispense: 10 tablet; Refill: 0     Advised to start Keflex at soon as picked up and take another dose before bed.  May continue to apply ice packs.  Instructed to outline redness with pen to monitor for worsening symptoms.  Advised make an appt to be seen in clinic tomorrow.  If symptoms get worse, go to ER.       Return in about 1 day (around 7/23/2020) for bee sting site recheck.    ALEX Gorman CNP  [facility]      Video-Visit Details    Type of service:  Video Visit    Video End Time:4:14    Originating Location (pt. Location): Home    Distant Location (provider location):  [facility]     Platform used for Video Visit: Doximity    Return in about 1 day (around 7/23/2020) for bee sting site recheck.       ALEX Gorman CNP

## 2020-07-23 NOTE — PROGRESS NOTES
SUBJECTIVE:  Saritha Pearson is an 54 year old female who presents for hornet sting on left arm.  Got stung on back of upper left arm.  Seems worse now than was initially. Initial sting was about the size of a dime.  Last night started to more red and bigger area of redness.  She took a couple of a friend's amoxicillin tabs the first day she got stung.  Then today took a cephalexin tab from a friend. Has increased in size and become larger and feels hot and tender.  Itches some.  Juan M a line earlier today around the redness and now is larger than that.  No fevers, chills, sweats.  No n/v/d.  No swelling of lips, tongue, or throat.  No shortness of breath or trouble breathing.  No h/o allergies to stings.      PMH:   has a past medical history of Asthma, Constipation, Gastro-oesophageal reflux disease, PONV (postoperative nausea and vomiting), and Vaginal high risk HPV DNA test positive (2016, 2018, 2019).  Patient Active Problem List   Diagnosis     Constipation     Overweight     Right knee pain     Other postprocedural status(V45.89)     Sebaceous cyst     Social History     Socioeconomic History     Marital status: Single     Spouse name: Not on file     Number of children: Not on file     Years of education: Not on file     Highest education level: Not on file   Occupational History     Not on file   Social Needs     Financial resource strain: Not on file     Food insecurity     Worry: Not on file     Inability: Not on file     Transportation needs     Medical: Not on file     Non-medical: Not on file   Tobacco Use     Smoking status: Never Smoker     Smokeless tobacco: Never Used   Substance and Sexual Activity     Alcohol use: Yes     Comment: occasionally     Drug use: No     Sexual activity: Yes     Partners: Male     Birth control/protection: None   Lifestyle     Physical activity     Days per week: Not on file     Minutes per session: Not on file     Stress: Not on file   Relationships     Social  connections     Talks on phone: Not on file     Gets together: Not on file     Attends Buddhism service: Not on file     Active member of club or organization: Not on file     Attends meetings of clubs or organizations: Not on file     Relationship status: Not on file     Intimate partner violence     Fear of current or ex partner: Not on file     Emotionally abused: Not on file     Physically abused: Not on file     Forced sexual activity: Not on file   Other Topics Concern     Parent/sibling w/ CABG, MI or angioplasty before 65F 55M? Not Asked   Social History Narrative     Not on file     Family History   Problem Relation Age of Onset     Cerebrovascular Disease Father 60     Cerebrovascular Disease Maternal Grandmother      Cancer - colorectal Maternal Grandfather 50     Cancer Paternal Grandmother      Cancer Paternal Grandfather      Cancer - colorectal Sister 40     Breast Cancer Paternal Aunt      Breast Cancer Paternal Aunt      Breast Cancer Paternal Aunt      Breast Cancer Paternal Aunt      Breast Cancer Paternal Aunt        ALLERGIES:  Sulfa drugs    Current Outpatient Medications   Medication     cephALEXin (KEFLEX) 500 MG capsule     predniSONE (DELTASONE) 20 MG tablet     albuterol (PROAIR HFA/PROVENTIL HFA/VENTOLIN HFA) 108 (90 BASE) MCG/ACT Inhaler     No current facility-administered medications for this visit.          ROS:  ROS is done and is negative for general/constitutional, eye, ENT, Respiratory, cardiovascular, GI, , Skin, musculoskeletal except as noted elsewhere.  All other review of systems negative except as noted elsewhere.      OBJECTIVE:  BP (!) 145/87   Pulse 62   Temp 97.6  F (36.4  C) (Oral)   Resp 16   Wt 89.8 kg (198 lb)   SpO2 97%   BMI 23.18 kg/m    GENERAL APPEARANCE: Alert, in no acute distress  EYES: normal  NOSE:normal  OROPHARYNX:normal  NECK:No adenopathy,masses or thyromegaly  RESP: normal and clear to auscultation  CV:regular rate and rhythm and no murmurs,  clicks, or gallops  ABDOMEN: Abdomen soft, non-tender. BS normal. No masses, organomegaly  SKIN: left arm- posterior upper arm with approx 14x10 cm area with moderate erythema, moderate warmth, minimal edema, and mild tenderness to touch; no fluctuance or purulence.    MUSCULOSKELETAL:Musculoskeletal normal except left arm as noted above      RESULTS  No results found for any visits on 07/22/20..  No results found for this or any previous visit (from the past 48 hour(s)).    ASSESSMENT/PLAN:    ASSESSMENT / PLAN:  (L03.114) Cellulitis of left upper extremity  (primary encounter diagnosis)  Comment: currently c/w secondary cellulitis after hornet sting.  No evidence of drainage or purulence so will tx with keflex  Plan: cephALEXin (KEFLEX) 500 MG capsule        Reviewed medication instructions and side effects. Follow up if experiences side effects.. I reviewed supportive care, otc meds to use if needed, expected course, and signs of concern.  Follow up as needed or if she does not improve within 5 day(s) or if worsens in any way.  Reviewed red flag symptoms and is to go to the ER if experiences any of these.    (T63.451A) Hornet sting, accidental or unintentional, initial encounter  Comment: two days ago.  Treat cellulitis as above.  Plan: I reviewed supportive care, otc meds to use if needed including benadryl or zyrtec for itching, expected course, and signs of concern.  Follow up as needed or if she does not improve within 5 day(s) or if worsens in any way.  Reviewed red flag symptoms and is to go to the ER if experiences any of these.    (R03.0) Elevated BP without diagnosis of hypertension  Comment: likely due to current pain  Plan: Recheck BP in 1-2 weeks with a nurse visit, [place] pharmacy visit, or a visit to primary care doctor.      See [facility] for orders, medications, letters, patient instructions    Ernestina Contreras M.D.

## 2020-07-23 NOTE — PATIENT INSTRUCTIONS
Recheck BP in 1-2 weeks with a nurse visit, [place] pharmacy visit, or a visit to primary care doctor.

## 2020-09-11 ENCOUNTER — MYC MEDICAL ADVICE (OUTPATIENT)
Dept: FAMILY MEDICINE | Facility: CLINIC | Age: 55
End: 2020-09-11

## 2020-09-11 DIAGNOSIS — R05.9 COUGH: Primary | ICD-10-CM

## 2020-09-11 DIAGNOSIS — J01.90 ACUTE SINUSITIS WITH SYMPTOMS > 10 DAYS: ICD-10-CM

## 2020-09-11 RX ORDER — BENZONATATE 200 MG/1
200 CAPSULE ORAL 3 TIMES DAILY PRN
Qty: 21 CAPSULE | Refills: 0 | Status: SHIPPED | OUTPATIENT
Start: 2020-09-11 | End: 2020-12-17

## 2020-09-29 ENCOUNTER — RECORDS - HEALTHEAST (OUTPATIENT)
Dept: ADMINISTRATIVE | Facility: OTHER | Age: 55
End: 2020-09-29

## 2020-10-15 ENCOUNTER — TELEPHONE (OUTPATIENT)
Dept: FAMILY MEDICINE | Facility: CLINIC | Age: 55
End: 2020-10-15

## 2020-10-15 NOTE — TELEPHONE ENCOUNTER
Pt states she has a scratch from her cat.  The area is  and red.  She said it is a dark red and is not spreading.  Advised evaluation at urgent care this evening.  Pt will come to urgent care.    Pt asking if her tetanus shot is up to date.  Advised yes, it was updated 11/2/18.    Pt has no further questions.  Zahida Zuleta BSN, RN

## 2020-10-15 NOTE — TELEPHONE ENCOUNTER
Reason for call:  Symptom   Symptom or request: Cat scratch and soreness in area     Duration (how long have symptoms been present): 1 week   Have you been treated for this before? no    Additional comments: any    Phone number to reach patient:  Home number on file [phone] (home)    Best Time:  Any     Can we leave a detailed message on this number?  YES    Travel screening: Negative

## 2020-11-08 ENCOUNTER — HEALTH MAINTENANCE LETTER (OUTPATIENT)
Age: 55
End: 2020-11-08

## 2020-11-18 ENCOUNTER — OFFICE VISIT (OUTPATIENT)
Dept: FAMILY MEDICINE | Facility: CLINIC | Age: 55
End: 2020-11-18
Payer: COMMERCIAL

## 2020-11-18 VITALS
BODY MASS INDEX: 22.94 KG/M2 | HEART RATE: 76 BPM | TEMPERATURE: 96 F | DIASTOLIC BLOOD PRESSURE: 87 MMHG | SYSTOLIC BLOOD PRESSURE: 135 MMHG | OXYGEN SATURATION: 97 % | WEIGHT: 196 LBS

## 2020-11-18 DIAGNOSIS — M76.60 ACHILLES TENDON PAIN: ICD-10-CM

## 2020-11-18 DIAGNOSIS — M79.672 LEFT FOOT PAIN: Primary | ICD-10-CM

## 2020-11-18 DIAGNOSIS — R10.13 EPIGASTRIC PRESSURE: ICD-10-CM

## 2020-11-18 PROCEDURE — 99214 OFFICE O/P EST MOD 30 MIN: CPT | Performed by: FAMILY MEDICINE

## 2020-11-18 NOTE — PATIENT INSTRUCTIONS
1. Stop by the  and make podiatry appointment - otherwise call [phone].    2. See you early 2021 for a physical with fasting labs.

## 2020-11-18 NOTE — PROGRESS NOTES
HPI:    Jess is a 55 year old female here to discuss:    Please note: only the issues listed at the bottom under Assessment and Plan are addressed today. The rest of the medical problems are listed for the sake of completeness.    Left foot and achilles pain - she tells me a couple of weeks ago a dirty metal scratched her left achilles. Since then she has had pain at the left achilles. She also has pain at the bottom of the left heel. It is worse first thing in the morning or after prolonged inactivity. No swelling.   Evaluation and treatment:   I think she has achilles tendonitis and plantar fasciitis.   I am referring her to podiatry.   In the meantime, I suggested some stretching exercises, rolling on tennis ball and OTC brace for night time.     Epigastric pain - this is minor issue. She feels not so much pain but fullness or pressure only when she bends down. This is around the xyphoid process.  Evaluation and treatment:    She wonders if she has a hernia - but she denies any bulging.    She also said someone she knows has hiatal hernia - but she denies any burning pain or difficulty with swallowing etc.   This may be due to xyphoid pain when pressed or fullness in the stomach.   I reassured her. No further evaluation is needed.   I asked her to report worsening or new symptoms.      Recurrent maxillary sinusitis - this episode started 3 weeks ago. Symptoms are sinus congestion and pain on one side. Nasal discharge. No fever.   Evaluation and treatment:    Augmentin is used when symptoms occur. I refilled that for her.   I suggested seeing ENT once things settle down with this COVID19 pandemic.    Skin lumps - symptoms present since around early Jan 2020. Located on right axilla. There was swelling, redness, pain. Denies fevers or chills. No discharge. There is also non inflamed lump on the left side of the neck.   Evaluation and treatment:    On 1/24/20 I placed her on Doxy. This did not help.   I&D performed  2/4/20 with packing gauze and placed her on Clinda.   She was scheduled for excision in the OR on 3/18/20 but this got postponed due to COVID19 pandemic.    Constipation and chronic abd pain - more recently (about one month) she has had abd pain to the left of the umbilicus. She feels bloated. She has BM's every 2 days that are hard to evacuate. No blood in stool or black stools. No fevers. She has some urinary frequency with difficulty emptying the bladder. This is baseline for her. No dysuria or hematuria.  Evaluation and treatment:    She has had abdominal surgeries, including hernia surgery in the pat - I suspect adhesions.   Miralax bid helps. I asked her to increase to tid or qid until daily BM's that are soft.    She follows with GI - I got a CT report [facility] at [facility] dated 9/25/19 - showed 1.6 right adnexal cyst and otherwise was unremarkable.   She also had recent colonoscopy - polyp was resected.   Pelvic U/S 10/8/19 - 1.7 cm cyst right ovary - I told her I did not think this was causing her pain and does not require follow-up unless symptoms change.   U/A 10/4/19 unremarkable.   I asked her to eat daily fresh fruits and veggies.   I previously asked her to try Metamucil twice per day.     CT ABDOMEN AND PELVIS WITH CONTRAST 1/27/2016 2:50 PM        IMPRESSION:  1. Fluid distention of the colon without obvious obstructing mass or  debris. Oral contrast reaches the right colon and the small bowel is  nondistended. No evidence of obstruction or diverticulitis. Appendix  is normal.  2. Evidence of old granulomatous disease.    Overweight - Diet and exercise discussed.    Wt Readings from Last 5 Encounters:   11/18/20 88.9 kg (196 lb)   07/22/20 89.8 kg (198 lb)   03/03/20 86.2 kg (190 lb)   02/25/20 88.9 kg (196 lb)   02/06/20 86.6 kg (191 lb)     Body mass index is 22.94 kg/m .    Surgical history:   Multiple cosmetic surgeries   Hysterectomy - supracervical?   Right knee arthroscopic surgery    Preventive:   She declines flu shot. She declines Shingles vaccine.    Immunization History   Administered Date(s) Administered     HepB-Adult 12/09/2008, 02/04/2009     Influenza (IIV3) PF 12/09/2008     TDAP Vaccine (Adacel) 11/02/2018     Tdap (Adacel,Boostrix) 12/03/2008       Lipids screen:    Recent Labs   Lab Test 07/27/17  1432 04/26/16  1157   CHOL 168 147   HDL 65 64   LDL 87 66   TRIG 81 85     STD screen: collected today.    Anemia screen:     CBC RESULTS:   Recent Labs   Lab Test 04/22/18  0451   WBC 6.7   RBC 4.68   HGB 12.5   HCT 37.7   MCV 81   MCH 26.7   MCHC 33.2   RDW 13.2            Diabetes screen:     Last Comprehensive Metabolic Panel:  Sodium   Date Value Ref Range Status   02/25/2020 140 133 - 144 mmol/L Final     Potassium   Date Value Ref Range Status   02/25/2020 3.9 3.4 - 5.3 mmol/L Final     Chloride   Date Value Ref Range Status   02/25/2020 105 94 - 109 mmol/L Final     Carbon Dioxide   Date Value Ref Range Status   02/25/2020 29 20 - 32 mmol/L Final     Anion Gap   Date Value Ref Range Status   02/25/2020 6 3 - 14 mmol/L Final     Glucose   Date Value Ref Range Status   02/25/2020 94 70 - 99 mg/dL Final     Comment:     Fasting specimen     Urea Nitrogen   Date Value Ref Range Status   02/25/2020 12 7 - 30 mg/dL Final     Creatinine   Date Value Ref Range Status   02/25/2020 0.80 0.52 - 1.04 mg/dL Final     GFR Estimate   Date Value Ref Range Status   02/25/2020 84 >60 mL/min/[1.73_m2] Final     Comment:     Non  GFR Calc  Starting 12/18/2018, serum creatinine based estimated GFR (eGFR) will be   calculated using the Chronic Kidney Disease Epidemiology Collaboration   (CKD-EPI) equation.       Calcium   Date Value Ref Range Status   02/25/2020 10.4 (H) 8.5 - 10.1 mg/dL Final     Mammogram: May 2018 - was told it was ok after doing additional imaging. Reordered.    Contraception: n/a due to hysterectomy.    PAP - hysterectomy - review of records via Care Everywhere says  hysterectomy - pathology says there was cervix. On exam I don't see cervix but she says her surgeon (Dr. Chambers) told her the cervix was left behind - I previously collected a vaginal cuff PAP and HPV. But I told her she does not need this any more. But she can also make an appointment with Dr. Chambers to clarify the issue.     Lab Results   Component Value Date    PAP NIL 10/15/2019     HPV was positive.    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 Here with friend, Mel.  CV: left DP pulse normal.  Abd: Soft, non tender, non distended, with normal bowel sounds. No liver or spleen enlargement. No masses. No hernias.  MS: the left achilles, left ankle and foot are normal by inspection. There is no thickening or tenderness of the left achilles. There is typical tenderness over the plantar aspect of the calcaneous.    Assessment and Plan - Decision Making    1. Left foot pain    Per HPI    - Orthopedic & Spine  Referral; Future    2. Achilles tendon pain    Per HPI    - Orthopedic & Spine  Referral; Future    3. Epigastric pressure    Per HPI      Written instructions given as follows:    Patient Instructions   1. Stop by the  and make podiatry appointment - otherwise call [phone].    2. See you early 2021 for a physical with fasting labs.

## 2020-12-08 ENCOUNTER — OFFICE VISIT (OUTPATIENT)
Dept: PODIATRY | Facility: CLINIC | Age: 55
End: 2020-12-08
Attending: FAMILY MEDICINE
Payer: COMMERCIAL

## 2020-12-08 VITALS — DIASTOLIC BLOOD PRESSURE: 86 MMHG | SYSTOLIC BLOOD PRESSURE: 150 MMHG | HEART RATE: 86 BPM

## 2020-12-08 DIAGNOSIS — M76.60 ACHILLES TENDON PAIN: ICD-10-CM

## 2020-12-08 DIAGNOSIS — M79.672 LEFT FOOT PAIN: ICD-10-CM

## 2020-12-08 PROCEDURE — 99243 OFF/OP CNSLTJ NEW/EST LOW 30: CPT | Performed by: PODIATRIST

## 2020-12-08 NOTE — LETTER
12/8/2020         RE: Saritha Pearson  [address]  [facility] [address]        Dear Colleague,    Thank you for referring your patient, Saritha Pearson, to the [facility]. Please see a copy of my visit note below.    Subjective:    Patient is seen today in consult from Dr. Gold with a 1 month(s) hx of left heel pain.  Patient states that initially she was getting up out of a chair and a piece of rested metal hit her posterior Achilles at insertion.  She had some pain here.  She states this is almost totally resolved now.  Her pain now is more her plantar heel.  She has a positive history of post static dyskinesia.  Pain aggravated by activity and relieved by rest.  Points to the plantarmedial calcaneal tubercle.  Most painful upon rising in a.m. or after prolonged sitting.  Aggravated by activity and relieved by rest.    Denies erythema, edema, ecchymosis, numbness, loss of strength.  Patient currently not working.  Around the house she is wearing socks or slippers.    ROS:  A 10-point review of systems was performed and is positive for that noted in the HPI and as seen below.  All other areas are negative.        Allergies   Allergen Reactions     Sulfa Drugs Hives, Itching and Rash       Current Outpatient Medications   Medication Sig Dispense Refill     albuterol (PROAIR HFA/PROVENTIL HFA/VENTOLIN HFA) 108 (90 BASE) MCG/ACT Inhaler Inhale 2 puffs into the lungs every 4 hours as needed for shortness of breath / dyspnea or wheezing (Patient not taking: Reported on 7/22/2020) 1 Inhaler 1     benzonatate (TESSALON) 200 MG capsule Take 1 capsule (200 mg) by mouth 3 times daily as needed for cough (Patient not taking: Reported on 12/8/2020) 21 capsule 0       Patient Active Problem List   Diagnosis     Constipation     Overweight     Right knee pain     Other postprocedural status(V45.89)     Sebaceous cyst       Past Medical History:   Diagnosis Date     Asthma     asthma sx after resp  infections     Constipation      Gastro-oesophageal reflux disease      PONV (postoperative nausea and vomiting)      Vaginal high risk HPV DNA test positive 2016, 2018, 2019    see problem list       Past Surgical History:   Procedure Laterality Date     ABDOMEN SURGERY      abdominal wall mass     ABDOMINOPLASTY      pt has post op infection     COSMETIC SURGERY       EXCISE LESION TRUNK  10/4/2011    Procedure:EXCISE LESION TRUNK; Excision Abdominal Wall Mass ; Surgeon:CARLITO BETTS; Location:[facility] OR     GYN SURGERY       HYSTERECTOMY       HYSTERECTOMY, PAP NO LONGER INDICATED  11/02/2018       Family History   Problem Relation Age of Onset     Cerebrovascular Disease Father 60     Cerebrovascular Disease Maternal Grandmother      Cancer - colorectal Maternal Grandfather 50     Cancer Paternal Grandmother      Cancer Paternal Grandfather      Cancer - colorectal Sister 40     Breast Cancer Paternal Aunt      Breast Cancer Paternal Aunt      Breast Cancer Paternal Aunt      Breast Cancer Paternal Aunt      Breast Cancer Paternal Aunt        Social History     Tobacco Use     Smoking status: Never Smoker     Smokeless tobacco: Never Used   Substance Use Topics     Alcohol use: Yes     Comment: occasionally         Objective:    Vitals: BP (!) 150/86   Pulse 86   BMI: There is no height or weight on file to calculate BMI.  Height: Data Unavailable    Constitutional/ general:  Pt is in no apparent distress, appears well-nourished.  Cooperative with history and physical exam.     Psych:  The patient answered questions appropriately.  Normal affect.  Seems to have reasonable expectations, in terms of treatment.     Eyes:  Visual scanning/ tracking without deficit.     Ears:  Response to auditory stimuli is normal.  No hearing aid devices.  Auricles in proper alignment.     Lymphatic:  Popliteal lymph nodes not enlarged.     Lungs:  Non labored breathing, non labored speech. No cough.  No audible wheezing.  Even, quiet breathing.       Vascular:  Pedal pulses are palpable bilaterally for both the DP and PT arteries.  CFT < 3 sec.  No edema.  Pedal hair growth noted.     Neuro:  Alert and oriented x 3. Coordinated gait.  Light touch sensation is intact to the L4, L5, S1 distributions. No obvious deficits.  No evidence of neurological-based weakness, spasticity, or contracture in the lower extremities.     Derm: Normal texture and turgor.  No erythema, ecchymosis, or cyanosis.  No open lesions.     Musculoskeletal:    Lower extremity muscle strength is normal.  Patient is ambulatory without an assistive device or brace.  No gross deformities.     Pronated arch with weightbearing.   ROM is within normal limits.  Negative tinel's sign.  No side to side compression pain of the calcaneus.  Pain upon palpation to the left plantarmedial  of the calcaneus.  No erythema, edema, ecchymosis, or subcutaneous masses noted.  No pain on palpation or stressing any tendons.  Negative Tinel's sign.  The posterior left Achilles has very vague pain with palpation.  No erythema edema or scratches noted.        Assessment:    Left posterior heel pain status post blunt trauma   Plantar Fasciitis left foot     Plan:   Explained to patient that her posterior heel is mostly unremarkable.  They will just give this time for total healing.  Explained that her plantar heel pain is from plantar fasciitis.  Discussed with patient that I see no correlation between her posterior heel injury and her plantar fasciitis.  Discussed etiology and treatment options with the patient regarding plantar fasciitis.  The potential causes and nature of plantar fasciitis were discussed with the patient.  We reviewed the natural history/prognosis of the condition and risks if left untreated.  These include chronic pain, other sites of pain due to gait changes, and potential plantar fascial rupture.      We discussed possible causes of the condition as it relates to the  patients specific situation.      Conservative treatment options were reviewed:  appropriate shoes, avoidance of barefoot walking, inserts/orthoses, stretching, ice, massage, immobilization and NSAIDs.     We also reviewed the options of injection therapy and surgery.  However, it was made clear that surgery is only considered when conservative therapy fails.  The risks and benefits of injection therapy, and surgery were discussed.  Dispensed handout.       Explained to patient her plantar fasciitis probably from being at home a lot and not wearing shoes.  After thorough discussion and answering all questions, the patient elected to modifying activities, supportive shoes, ice, stretching, and not going barefoot.  Good house shoes at all times and I made recommendations.  Discussed good shoes outside of the house.  RETURN TO CLINIC prn.  Thank you for allowing me participate in the care of this patient.        Tristan Weiss DPM DPM, FACFAS        Again, thank you for allowing me to participate in the care of your patient.        Sincerely,        Tristan Weiss DPM

## 2020-12-08 NOTE — PATIENT INSTRUCTIONS
We wish you continued good healing. If you have any questions or concerns, please do not hesitate to contact us at [phone]    [employer] (secure e-mail communication and access to your chart) to send a message or to make an appointment.    Please remember to call and schedule a follow up appointment if one was recommended at your earliest convenience.     +++OF MARCH 2020+++ LOCATION AND HOURS HAVE CHANGED    PLEASE CALL CLINICS TO VERIFY DAYS AND TIMES  PODIATRY CLINIC HOURS  TELEPHONE NUMBER    Dr. Tristan RAMIREZPJUAN FRANCISCO [facility]        Clinics:  [facility]   Tuesday 1PM-6PM  [place]  Wednesday 745AM-330PM  [place]  Thursday/Friday 745AM-230PM  [facility] APPOINTMENTS  [phone]    [place] APPOINTMENTS  [phone]          If you need a medication refill, please contact us you may need lab work and/or a follow up visit prior to your refill (i.e. Antifungal medications).    If MRI needed please call Imaging at [phone] or [phone]    HOW DO I GET MY KNEE SCOOTER? Knee scooters can be picked up at ANY Medical Supply stores with your knee scooter Prescription.  OR    Bring your signed prescription to an [facility] showroom.

## 2020-12-17 ENCOUNTER — VIRTUAL VISIT (OUTPATIENT)
Dept: FAMILY MEDICINE | Facility: CLINIC | Age: 55
End: 2020-12-17
Payer: COMMERCIAL

## 2020-12-17 DIAGNOSIS — L02.92 BOIL: Primary | ICD-10-CM

## 2020-12-17 PROCEDURE — 99213 OFFICE O/P EST LOW 20 MIN: CPT | Mod: 95 | Performed by: NURSE PRACTITIONER

## 2020-12-17 RX ORDER — CEPHALEXIN 500 MG/1
500 CAPSULE ORAL 4 TIMES DAILY
Qty: 28 CAPSULE | Refills: 0 | Status: SHIPPED | OUTPATIENT
Start: 2020-12-17 | End: 2020-12-24

## 2020-12-17 NOTE — PATIENT INSTRUCTIONS
Patient Education     Folliculitis  Folliculitis is an infection of a hair follicle. A hair follicle is the little pocket where a hair grows out of the skin. Bacteria normally live on the skin. But sometimes bacteria can get trapped in a follicle and cause infection. This causes a bumpy rash. The area over the follicles is red and raised. It may itch or be painful. The bumps may have fluid (pus) inside. The pus may leak and then form crusts. Sores can spread to other areas of the body. Once it goes away, folliculitis can come back at any time. Severe cases may cause lasting (permanent) hair loss and scarring.   Folliculitis can happen anywhere on the body where hair grows. It can be caused by rubbing from tight clothing. Ingrown hairs can cause it. Soaking in a hot tub or swimming pool that has bacteria in the water can cause it. It may also occur if a hair follicle is blocked by a bandage.   Sores often go away in a few days with no treatment. In some cases, medicine may be given. A small piece of skin or pus may be taken to find the type of bacteria causing the infection.   Home care  The healthcare provider may prescribe an antibiotic cream or ointment. Antibiotics taken by mouth (oral) may also be prescribed. Or you may be told to use an over-the-counter antibiotic cream. Follow all instructions when using any of these medicines.   General care    Apply warm, moist compresses to the sores for 20 minutes up to 3 times a day. You can make a compress by soaking a cloth in warm water. Squeeze out excess water.    Don t cut, poke, or squeeze the sores. This can be painful and spread infection.    Don t scratch the affected area. Scratching can delay healing.    Don t shave the areas affected by folliculitis.    If the sores leak fluid, cover the area with a nonstick gauze bandage. Use as little tape as possible. Carefully get rid of all soiled bandages.    Dress in loose cotton clothing.    Change sheets and  blankets if they are soiled by pus. Wash all clothes, towels, sheets, and cloth diapers in soap and hot water. Don't share clothes, towels, or sheets with other family members.    Don't soak the sores in bath water. This can spread infection. Instead keep the area clean by gently washing sores with soap and warm water.    Wash your hands or use antibacterial gels often to prevent spreading the bacteria.    Follow-up care  Follow up with your healthcare provider, or as advised.  When to get medical advice  Call your healthcare provider right away if any of these occur:    Fever of 100.4 F (38 C) or higher, or as directed by your provider    Rash spreads    Rash does not get better with treatment    Redness or swelling that gets worse    Rash becomes more painful    Foul-smelling fluid leaking from the skin    Rash improves, but then comes back   Tristen last reviewed this educational content on 8/1/2019 2000-2020 [employer]. [address] [place] [address], [place] [address]. All rights reserved. This information is not intended as a substitute for professional medical care. Always follow your healthcare professional's instructions.

## 2020-12-17 NOTE — PROGRESS NOTES
"Saritha Pearson is a 55 year old female who is being evaluated via a billable video visit.      The patient has been notified of following:     \"This video visit will be conducted via a call between you and your physician/provider. We have found that certain health care needs can be provided without the need for an in-person physical exam.  This service lets us provide the care you need with a video conversation.  If a prescription is necessary we can send it directly to your pharmacy.  If lab work is needed we can place an order for that and you can then stop by our lab to have the test done at a later time.    Video visits are billed at different rates depending on your insurance coverage.  Please reach out to your insurance provider with any questions.    If during the course of the call the physician/provider feels a video visit is not appropriate, you will not be charged for this service.\"    Patient has given verbal consent for Video visit? Yes  How would you like to obtain your AVS? MyChart  If you are dropped from the video visit, the video invite should be resent to: Text to cell phone: [phone]  Will anyone else be joining your video visit? No    Subjective     Saritha Pearson is a 55 year old female who presents today via video visit for the following health issues:    HPI       Derm Issue    Duration: 4-5 days    Description  Location: right arm pit   Character: boil, pain,    Accompanying signs and symptoms: recurrent-has had these before- treated with KEFLEX in the past.  Was supposed to have surgery for it but it was cancelled due to COVID. No fever, chills or drainage.     Therapies tried and outcome: peroxide, warm towel, heating pad, witch hazel        Video Start Time: 1640    Review of Systems   Constitutional, HEENT, cardiovascular, pulmonary, GI, , musculoskeletal, neuro, skin, endocrine and psych systems are negative, except as otherwise noted.      Objective           Vitals:  No vitals " were obtained today due to virtual visit.    Physical Exam     GENERAL: Healthy, alert and no distress  EYES: Eyes grossly normal to inspection.  No discharge or erythema, or obvious scleral/conjunctival abnormalities.  RESP: No audible wheeze, cough, or visible cyanosis.  No visible retractions or increased work of breathing.    SKIN: POSITIVE for erythematous, raised boil to R axilla seen on video  NEURO: Cranial nerves grossly intact.  Mentation and speech appropriate for age.  PSYCH: Mentation appears normal, affect normal/bright, judgement and insight intact, normal speech and appearance well-groomed.          Assessment & Plan     Saritha was seen today for derm problem.    Diagnoses and all orders for this visit:    Boil  -     cephALEXin (KEFLEX) 500 MG capsule; Take 1 capsule (500 mg) by mouth 4 times daily for 7 days With daily yogurt or probiotics.  -     diclofenac (VOLTAREN) 1 % topical gel; Apply 4 g topically 4 times daily            See Patient Instructions: advised to take the full course of antibiotics with daily yogurt or probiotics. Follow up if symptoms persist or worsen.     Return in about 1 week (around 12/24/2020), or if symptoms worsen or fail to improve.    SHIRA Martinez  [facility]      Video-Visit Details    Type of service:  Video Visit    Video End Time:1700    Originating Location (pt. Location): Home    Distant Location (provider location):  [facility]     Platform used for Video Visit: Selin

## 2020-12-18 ENCOUNTER — MYC MEDICAL ADVICE (OUTPATIENT)
Dept: FAMILY MEDICINE | Facility: CLINIC | Age: 55
End: 2020-12-18

## 2020-12-18 DIAGNOSIS — L02.92 BOIL: Primary | ICD-10-CM

## 2020-12-18 RX ORDER — CLINDAMYCIN HCL 300 MG
300 CAPSULE ORAL 3 TIMES DAILY
Qty: 7 CAPSULE | Refills: 0 | Status: SHIPPED | OUTPATIENT
Start: 2020-12-18 | End: 2020-12-28

## 2020-12-25 ENCOUNTER — MYC MEDICAL ADVICE (OUTPATIENT)
Dept: FAMILY MEDICINE | Facility: CLINIC | Age: 55
End: 2020-12-25

## 2020-12-25 DIAGNOSIS — L02.92 BOIL: ICD-10-CM

## 2020-12-28 RX ORDER — CLINDAMYCIN HCL 300 MG
300 CAPSULE ORAL 3 TIMES DAILY
Qty: 28 CAPSULE | Refills: 0 | Status: SHIPPED | OUTPATIENT
Start: 2020-12-28 | End: 2021-01-06

## 2021-01-22 ENCOUNTER — VIRTUAL VISIT (OUTPATIENT)
Dept: FAMILY MEDICINE | Facility: CLINIC | Age: 56
End: 2021-01-22
Payer: COMMERCIAL

## 2021-01-22 DIAGNOSIS — Z12.31 ENCOUNTER FOR SCREENING MAMMOGRAM FOR BREAST CANCER: ICD-10-CM

## 2021-01-22 DIAGNOSIS — Z79.890 HORMONE REPLACEMENT THERAPY: Primary | ICD-10-CM

## 2021-01-22 PROCEDURE — 99213 OFFICE O/P EST LOW 20 MIN: CPT | Mod: 95 | Performed by: FAMILY MEDICINE

## 2021-01-22 RX ORDER — ESTRADIOL 0.5 MG/1
0.5 TABLET ORAL DAILY
Qty: 90 TABLET | Refills: 3 | Status: SHIPPED | OUTPATIENT
Start: 2021-01-22

## 2021-01-22 NOTE — PROGRESS NOTES
Saritha is a 55 year old who is being evaluated via a billable video visit.      How would you like to obtain your AVS? MyChart  If the video visit is dropped, the invitation should be resent by: Text to cell phone: [phone]  Will anyone else be joining your video visit? No      Video did not work so converted to telephone visit.    Please note: only the issues listed at the bottom under Assessment and Plan are addressed today. The rest of the medical problems are listed for the sake of completeness.    Menopausal - has history of hysterectomy. Symptoms present since 2018. Symptoms hot flushes, dry vagina, irritability.  Evaluation and treatment:    Discussed options for treatment - patch would be preferable but pill more convenient?   We will try Estradiol 0.5 mg daily with option to go to 1 mg daily.    Discussed risks including breast cancer, blood clots - she verbalized understanding.   No need for Progesterone since hysterectomy.    Left foot and achilles pain - she tells me a couple of weeks ago a dirty metal scratched her left achilles. Since then she has had pain at the left achilles. She also has pain at the bottom of the left heel. It is worse first thing in the morning or after prolonged inactivity. No swelling.   Evaluation and treatment:   I think she has achilles tendonitis and plantar fasciitis.   I am referring her to podiatry.   In the meantime, I suggested some stretching exercises, rolling on tennis ball and OTC brace for night time.     Epigastric pain - this is minor issue. She feels not so much pain but fullness or pressure only when she bends down. This is around the xyphoid process.  Evaluation and treatment:    She wonders if she has a hernia - but she denies any bulging.    She also said someone she knows has hiatal hernia - but she denies any burning pain or difficulty with swallowing etc.   This may be due to xyphoid pain when pressed or fullness in the stomach.   I reassured her. No  further evaluation is needed.   I asked her to report worsening or new symptoms.    Recurrent maxillary sinusitis - this episode started 3 weeks ago. Symptoms are sinus congestion and pain on one side. Nasal discharge. No fever.   Evaluation and treatment:    Augmentin is used when symptoms occur. I refilled that for her.   I suggested seeing ENT once things settle down with this COVID19 pandemic.    Skin lumps - symptoms present since around early Jan 2020. Located on right axilla. There was swelling, redness, pain. Denies fevers or chills. No discharge. There is also non inflamed lump on the left side of the neck.   Evaluation and treatment:    On 1/24/20 I placed her on Doxy. This did not help.   I&D performed 2/4/20 with packing gauze and placed her on Clinda.   She was scheduled for excision in the OR on 3/18/20 but this got postponed due to COVID19 pandemic.    Constipation and chronic abd pain - more recently (about one month) she has had abd pain to the left of the umbilicus. She feels bloated. She has BM's every 2 days that are hard to evacuate. No blood in stool or black stools. No fevers. She has some urinary frequency with difficulty emptying the bladder. This is baseline for her. No dysuria or hematuria.  Evaluation and treatment:    She has had abdominal surgeries, including hernia surgery in the pat - I suspect adhesions.   Miralax bid helps. I asked her to increase to tid or qid until daily BM's that are soft.    She follows with GI - I got a CT report done at [facility] dated 9/25/19 - showed 1.6 right adnexal cyst and otherwise was unremarkable.   She also had recent colonoscopy - polyp was resected.   Pelvic U/S 10/8/19 - 1.7 cm cyst right ovary - I told her I did not think this was causing her pain and does not require follow-up unless symptoms change.   U/A 10/4/19 unremarkable.   I asked her to eat daily fresh fruits and veggies.   I previously asked her to try Metamucil twice per day.     CT  ABDOMEN AND PELVIS WITH CONTRAST 1/27/2016 2:50 PM        IMPRESSION:  1. Fluid distention of the colon without obvious obstructing mass or  debris. Oral contrast reaches the right colon and the small bowel is  nondistended. No evidence of obstruction or diverticulitis. Appendix  is normal.  2. Evidence of old granulomatous disease.    Overweight - Diet and exercise discussed.    Wt Readings from Last 5 Encounters:   11/18/20 88.9 kg (196 lb)   07/22/20 89.8 kg (198 lb)   03/03/20 86.2 kg (190 lb)   02/25/20 88.9 kg (196 lb)   02/06/20 86.6 kg (191 lb)     There is no height or weight on file to calculate BMI.    Surgical history:   Multiple cosmetic surgeries   Hysterectomy - supracervical?   Right knee arthroscopic surgery    Preventive:  She declines flu shot. She declines Shingles vaccine.    Immunization History   Administered Date(s) Administered     HepB-Adult 12/09/2008, 02/04/2009     Influenza (IIV3) PF 12/09/2008     TDAP Vaccine (Adacel) 11/02/2018     Tdap (Adacel,Boostrix) 12/03/2008       Lipids screen:    Recent Labs   Lab Test 07/27/17  1432 04/26/16  1157   CHOL 168 147   HDL 65 64   LDL 87 66   TRIG 81 85     STD screen: collected today.    Anemia screen:     CBC RESULTS:   Recent Labs   Lab Test 04/22/18  0451   WBC 6.7   RBC 4.68   HGB 12.5   HCT 37.7   MCV 81   MCH 26.7   MCHC 33.2   RDW 13.2            Diabetes screen:     Last Comprehensive Metabolic Panel:  Sodium   Date Value Ref Range Status   02/25/2020 140 133 - 144 mmol/L Final     Potassium   Date Value Ref Range Status   02/25/2020 3.9 3.4 - 5.3 mmol/L Final     Chloride   Date Value Ref Range Status   02/25/2020 105 94 - 109 mmol/L Final     Carbon Dioxide   Date Value Ref Range Status   02/25/2020 29 20 - 32 mmol/L Final     Anion Gap   Date Value Ref Range Status   02/25/2020 6 3 - 14 mmol/L Final     Glucose   Date Value Ref Range Status   02/25/2020 94 70 - 99 mg/dL Final     Comment:     Fasting specimen     Urea Nitrogen    Date Value Ref Range Status   02/25/2020 12 7 - 30 mg/dL Final     Creatinine   Date Value Ref Range Status   02/25/2020 0.80 0.52 - 1.04 mg/dL Final     GFR Estimate   Date Value Ref Range Status   02/25/2020 84 >60 mL/min/[1.73_m2] Final     Comment:     Non  GFR Calc  Starting 12/18/2018, serum creatinine based estimated GFR (eGFR) will be   calculated using the Chronic Kidney Disease Epidemiology Collaboration   (CKD-EPI) equation.       Calcium   Date Value Ref Range Status   02/25/2020 10.4 (H) 8.5 - 10.1 mg/dL Final     Mammogram: 1/23/20 negative. Reordered today.    Contraception: n/a due to hysterectomy.    PAP - hysterectomy - review of records via Care Everywhere says hysterectomy - pathology says there was cervix. On exam I don't see cervix but she says her surgeon (Dr. Chambers) told her the cervix was left behind - I previously collected a vaginal cuff PAP and HPV. But I told her she does not need this any more. But she can also make an appointment with Dr. Chambers to clarify the issue.     Lab Results   Component Value Date    PAP NIL 10/15/2019     HPV was positive.    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    Gen: Healthy appearing female in no acute distress. Here with friend, Mel.  CV: left DP pulse normal.  Abd: Soft, non tender, non distended, with normal bowel sounds. No liver or spleen enlargement. No masses. No hernias.  MS: the left achilles, left ankle and foot are normal by inspection. There is no thickening or tenderness of the left achilles. There is typical tenderness over the plantar aspect of the calcaneous.    Assessment and Plan - Decision Making    1. Hormone replacement therapy    Per HPI    - estradiol (ESTRACE) 0.5 MG tablet; Take 1 tablet  (0.5 mg) by mouth daily  Dispense: 90 tablet; Refill: 3    2. Encounter for screening mammogram for breast cancer    Per HPI    - *MA Screening Digital Bilateral; Future      Written instructions given as follows:    Patient Instructions   See you soon for a physical with fasting labs.        Total time on the phone and reviewing records: 11 min

## 2021-01-23 PROBLEM — Z79.890 HORMONE REPLACEMENT THERAPY: Status: ACTIVE | Noted: 2021-01-23

## 2021-03-17 ENCOUNTER — TRANSFERRED RECORDS (OUTPATIENT)
Dept: HEALTH INFORMATION MANAGEMENT | Facility: CLINIC | Age: 56
End: 2021-03-17

## 2021-03-28 ENCOUNTER — HEALTH MAINTENANCE LETTER (OUTPATIENT)
Age: 56
End: 2021-03-28

## 2021-04-22 ENCOUNTER — TRANSFERRED RECORDS (OUTPATIENT)
Dept: HEALTH INFORMATION MANAGEMENT | Facility: CLINIC | Age: 56
End: 2021-04-22

## 2021-04-26 ENCOUNTER — IMMUNIZATION (OUTPATIENT)
Dept: NURSING | Facility: CLINIC | Age: 56
End: 2021-04-26
Payer: COMMERCIAL

## 2021-04-26 PROCEDURE — 0001A PR COVID VAC PFIZER DIL RECON 30 MCG/0.3 ML IM: CPT

## 2021-04-26 PROCEDURE — 91300 PR COVID VAC PFIZER DIL RECON 30 MCG/0.3 ML IM: CPT

## 2021-05-17 ENCOUNTER — IMMUNIZATION (OUTPATIENT)
Dept: NURSING | Facility: CLINIC | Age: 56
End: 2021-05-17
Attending: INTERNAL MEDICINE
Payer: COMMERCIAL

## 2021-05-17 PROCEDURE — 0002A PR COVID VAC PFIZER DIL RECON 30 MCG/0.3 ML IM: CPT

## 2021-05-17 PROCEDURE — 91300 PR COVID VAC PFIZER DIL RECON 30 MCG/0.3 ML IM: CPT

## 2021-09-11 ENCOUNTER — HEALTH MAINTENANCE LETTER (OUTPATIENT)
Age: 56
End: 2021-09-11

## 2021-09-29 ENCOUNTER — MYC MEDICAL ADVICE (OUTPATIENT)
Dept: FAMILY MEDICINE | Facility: CLINIC | Age: 56
End: 2021-09-29

## 2021-09-29 DIAGNOSIS — N63.0 LUMP OR MASS IN BREAST: Primary | ICD-10-CM

## 2021-10-05 ENCOUNTER — TRANSFERRED RECORDS (OUTPATIENT)
Dept: HEALTH INFORMATION MANAGEMENT | Facility: CLINIC | Age: 56
End: 2021-10-05

## 2021-10-06 ENCOUNTER — OFFICE VISIT (OUTPATIENT)
Dept: FAMILY MEDICINE | Facility: CLINIC | Age: 56
End: 2021-10-06
Payer: COMMERCIAL

## 2021-10-06 VITALS
SYSTOLIC BLOOD PRESSURE: 131 MMHG | DIASTOLIC BLOOD PRESSURE: 82 MMHG | OXYGEN SATURATION: 96 % | BODY MASS INDEX: 25.4 KG/M2 | WEIGHT: 217 LBS | RESPIRATION RATE: 18 BRPM | TEMPERATURE: 97.4 F | HEART RATE: 87 BPM

## 2021-10-06 DIAGNOSIS — L72.9 SKIN CYST: Primary | ICD-10-CM

## 2021-10-06 PROCEDURE — 99213 OFFICE O/P EST LOW 20 MIN: CPT | Performed by: PHYSICIAN ASSISTANT

## 2021-10-06 NOTE — PROGRESS NOTES
Assessment & Plan       ICD-10-CM    1. Skin cyst  L72.9 Ob/Gyn Referral     Talk to patient about her concerns at this point no signs of infection noted.  Or follow-up with OB/GYN for possible removal or advice on cystic type lesion.  warning signs discussed.     Return in about 1 week (around 10/13/2021) for recheck.    KELLY [facility]   Saritha is a 56 year old who presents for the following health issues   HPI     Concern - Derm Problem   Onset: 2 weeks  Description: vaginal area  Intensity: moderate  Progression of Symptoms:  worsening  Accompanying Signs & Symptoms: pressure feeling   Previous history of similar problem: none  Precipitating factors:        Worsened by: certain movements   Alleviating factors:        Improved by: none  Therapies tried and outcome: None  No previous problems.     History of boils in arm pits.  No fevers.     Review of Systems   Constitutional, HEENT, cardiovascular, pulmonary, gi and gu systems are negative, except as otherwise noted.      Objective    /82   Pulse 87   Temp 97.4  F (36.3  C) (Tympanic)   Resp 18   Wt 98.4 kg (217 lb)   SpO2 96%   BMI 25.40 kg/m    Body mass index is 25.4 kg/m .  Physical Exam   GENERAL: healthy, alert and no distress  Skin: 1 cm cystic type lesion on the left perineal region.  No signs of infection mildly tender.  No discharge noted.  MA was present on exam.

## 2021-10-14 ENCOUNTER — OFFICE VISIT (OUTPATIENT)
Dept: OBGYN | Facility: CLINIC | Age: 56
End: 2021-10-14
Payer: COMMERCIAL

## 2021-10-14 VITALS
SYSTOLIC BLOOD PRESSURE: 131 MMHG | BODY MASS INDEX: 33.87 KG/M2 | HEART RATE: 101 BPM | HEIGHT: 67 IN | OXYGEN SATURATION: 97 % | WEIGHT: 215.8 LBS | DIASTOLIC BLOOD PRESSURE: 81 MMHG

## 2021-10-14 DIAGNOSIS — L02.92 BOIL: Primary | ICD-10-CM

## 2021-10-14 PROCEDURE — 99203 OFFICE O/P NEW LOW 30 MIN: CPT | Performed by: OBSTETRICS & GYNECOLOGY

## 2021-10-14 ASSESSMENT — MIFFLIN-ST. JEOR: SCORE: 1607.23

## 2021-10-14 NOTE — PROGRESS NOTES
"Saritha is a 56 year old   is here today complaining of a vulvar lesion.  She was seen and treated with Augmentin for a sinus infection, but was also diagnosed with a boil on her vulva.  She has finished the course of antibiotics and had a significant improvement in the lesion.  She reports it felt like a golf ball.       ROS: Pertinent ROS as above.    Gyn Hx:      Past Medical History:   Diagnosis Date     Asthma     asthma sx after resp infections     Constipation      Gastro-oesophageal reflux disease      PONV (postoperative nausea and vomiting)      Vaginal high risk HPV DNA test positive , ,     see problem list     Past Surgical History:   Procedure Laterality Date     ABDOMEN SURGERY      abdominal wall mass     ABDOMINOPLASTY      pt has post op infection     COSMETIC SURGERY       EXCISE LESION TRUNK  10/4/2011    Procedure:EXCISE LESION TRUNK; Excision Abdominal Wall Mass ; Surgeon:CARLITO BETTS; Location:[facility]     GYN SURGERY       HYSTERECTOMY       HYSTERECTOMY, PAP NO LONGER INDICATED  2018     Patient Active Problem List   Diagnosis     Constipation     Overweight     Right knee pain     Other postprocedural status(V45.89)     Sebaceous cyst     Hormone replacement therapy       ALL/Meds: Her medication and allergy histories were reviewed and are documented in their appropriate chart areas.    SH: Reviewed and documented in the appropriate area of the chart.  FH:  Her family history is reviewed and updated in the chart, today.  PMH: Her past medical, surgical, and obstetric histories were reviewed and updated today in the appropriate chart areas.    PE: /81 (BP Location: Right arm, Patient Position: Sitting, Cuff Size: Adult Large)   Pulse 101   Ht 1.703 m (5' 7.05\")   Wt 97.9 kg (215 lb 12.8 oz)   SpO2 97%   BMI 33.75 kg/m    Body mass index is 33.75 kg/m .    Pertinent Physical exam findings:    General Appearance:  healthy, alert, active, no " distress  Pelvic:       - Ext: Vulva and perineum are normal except for a small nodule at the 4 o'clock position approx 3-4 cm from the introitus.  On exam, it feel mildly firm, but there is minimal erythema or induration.  It is approx 1 cm in diameter and she also reports it has been draining.    I think another course of ABX should resolve it.  I also recommended warm moist compresses.      A/P:(L02.92) Boil  (primary encounter diagnosis)  Comment:   Plan: amoxicillin-clavulanate (AUGMENTIN) 875-125 MG         tablet                 -     - No orders of the defined types were placed in this encounter.

## 2021-10-29 ENCOUNTER — TRANSFERRED RECORDS (OUTPATIENT)
Dept: HEALTH INFORMATION MANAGEMENT | Facility: CLINIC | Age: 56
End: 2021-10-29
Payer: COMMERCIAL

## 2021-11-04 ENCOUNTER — OFFICE VISIT (OUTPATIENT)
Dept: OBGYN | Facility: CLINIC | Age: 56
End: 2021-11-04
Payer: COMMERCIAL

## 2021-11-04 VITALS
SYSTOLIC BLOOD PRESSURE: 155 MMHG | WEIGHT: 223 LBS | DIASTOLIC BLOOD PRESSURE: 78 MMHG | HEART RATE: 109 BPM | BODY MASS INDEX: 34.88 KG/M2 | OXYGEN SATURATION: 99 %

## 2021-11-04 DIAGNOSIS — T14.8XXA OPEN WOUND: Primary | ICD-10-CM

## 2021-11-04 PROCEDURE — 99212 OFFICE O/P EST SF 10 MIN: CPT | Performed by: OBSTETRICS & GYNECOLOGY

## 2021-11-05 NOTE — PROGRESS NOTES
"Saritha presents today needing attention with her vulvar wound.  She had a small vulvar cyst/boil when I saw her last.  At that time it there was no active infection and she seemed to be improving.  She did see Dr. Kamara and had it I&D's, with little production of pus.  She then went to see another Gynecologist, Dr. Topete.  He excised a sebaceous cyst and left the incision open with packing.  The follow up plan was for her to see Urgent care for dressing changes and see him in a month.  She had already scheduled an appointment with me, and decided to see me for her wound care.    Incisions: There is a 1.5-2 cm incision at 4 o'clock  On the left side of the vulva, approximately 4 cm from the introitus.  The old packing is removed.  The skin edges are infiltrated with 1%lidocaine.  The wound is gently cleaned and the the defect packed with 1/4\" string gauze.  I did show her friend how to pack the incision so she can do further dressing changes.  I did encourage her to make sure she follows up with Dr. Topete.    Assessment:   1 week(s) status post excision sebaceous cyst.  Plan: Daily packing changes   Return to see her surgeon in 1 month or as needed.  Efrain Lino MD FACOG        "

## 2021-11-16 ENCOUNTER — TELEPHONE (OUTPATIENT)
Dept: FAMILY MEDICINE | Facility: CLINIC | Age: 56
End: 2021-11-16

## 2021-11-16 ENCOUNTER — MYC MEDICAL ADVICE (OUTPATIENT)
Dept: FAMILY MEDICINE | Facility: CLINIC | Age: 56
End: 2021-11-16

## 2021-11-16 ENCOUNTER — E-VISIT (OUTPATIENT)
Dept: FAMILY MEDICINE | Facility: CLINIC | Age: 56
End: 2021-11-16
Payer: COMMERCIAL

## 2021-11-16 DIAGNOSIS — J01.90 ACUTE BACTERIAL SINUSITIS: Primary | ICD-10-CM

## 2021-11-16 DIAGNOSIS — B96.89 ACUTE BACTERIAL SINUSITIS: Primary | ICD-10-CM

## 2021-11-16 PROCEDURE — 99421 OL DIG E/M SVC 5-10 MIN: CPT | Performed by: FAMILY MEDICINE

## 2021-11-16 RX ORDER — AZITHROMYCIN 250 MG/1
TABLET, FILM COATED ORAL
Qty: 6 TABLET | Refills: 0 | Status: SHIPPED | OUTPATIENT
Start: 2021-11-16 | End: 2022-03-28

## 2021-11-16 NOTE — PATIENT INSTRUCTIONS
Sinusitis (Antibiotic Treatment)    The sinuses are air-filled spaces within the bones of the face. They connect to the inside of the nose. Sinusitis is an inflammation of the tissue that lines the sinuses. Sinusitis can occur during a cold. It can also happen due to allergies to pollens and other particles in the air. Sinusitis can cause symptoms of sinus congestion and a feeling of fullness. A sinus infection causes fever, headache, and facial pain. There is often green or yellow fluid draining from the nose or into the back of the throat (post-nasal drip). You have been given antibiotics to treat this condition.   Home care    Take the full course of antibiotics as instructed. Don't stop taking them, even when you feel better.    Drink plenty of water, hot tea, and other liquids as directed by the healthcare provider. This may help thin nasal mucus. It also may help your sinuses drain fluids.    Heat may help soothe painful areas of your face. Use a towel soaked in hot water. Or,  the shower and direct the warm spray onto your face. Using a vaporizer along with a menthol rub at night may also help soothe symptoms.     An expectorant with guaifenesin may help thin nasal mucus and help your sinuses drain fluids. Talk with your provider or pharmacists before taking an over-the-counter (OTC) medicine if you have any questions about it or its side effects..    You can use an OTC decongestant, unless a similar medicine was prescribed to you. Nasal sprays work the fastest. Use one that contains phenylephrine or oxymetazoline. First blow your nose gently. Then use the spray. Don't use these medicines more often than directed on the label. If you do, your symptoms may get worse. You may also take pills that contain pseudoephedrine. Don t use products that combine multiple medicines. This is because side effects may be increased. Read labels. You can also ask the pharmacist for help. (People with high blood  pressure should not use decongestants. They can raise blood pressure.) Talk with your provider or pharmacist if you have any questions about the medicine..    OTC antihistamines may help if allergies contributed to your sinusitis. Talk with your provider or pharmacist if you have any questions about the medicine..    Don't use nasal rinses or irrigation during an acute sinus infection, unless your healthcare provider tells you to. Rinsing may spread the infection to other areas in your sinuses.    Use acetaminophen or ibuprofen to control pain, unless another pain medicine was prescribed to you. If you have chronic liver or kidney disease or ever had a stomach ulcer, talk with your healthcare provider before using these medicines. Never give aspirin to anyone under age 18 who is ill with a fever. It may cause severe liver damage.    Don't smoke. This can make symptoms worse.    Follow-up care  Follow up with your healthcare provider, or as advised.   When to seek medical advice  Call your healthcare provider if any of these occur:     Facial pain or headache that gets worse    Stiff neck    Unusual drowsiness or confusion    Swelling of your forehead or eyelids    Symptoms don't go away in 10 days    Vision problems, such as blurred or double vision    Fever of 100.4 F (38 C) or higher, or as directed by your healthcare provider  Call 911  Call 911 if any of these occur:     Seizure    Trouble breathing    Feeling dizzy or faint    Fingernails, skin or lips look blue, purple , or gray  Prevention  Here are steps you can take to help prevent an infection:     Keep good hand washing habits.    Don t have close contact with people who have sore throats, colds, or other upper respiratory infections.    Don t smoke, and stay away from secondhand smoke.    Stay up to date with of your vaccines.  [employer] last reviewed this educational content on 12/1/2019 2000-2021 [employer]. All rights reserved. This  information is not intended as a substitute for professional medical care. Always follow your healthcare professional's instructions.        Dear Saritha Pearson    After reviewing your responses, I've been able to diagnose you with?a sinus infection caused by bacteria.?     Based on your responses and diagnosis, I have prescribed Zpak to treat your symptoms. I have sent this to your pharmacy.?     It is also important to stay well hydrated, get lots of rest and take over-the-counter decongestants,?tylenol?or ibuprofen if you?are able to?take those medications per your primary care provider to help relieve discomfort.?     It is important that you take?all of?your prescribed medication even if your symptoms are improving after a few doses.? Taking?all of?your medicine helps prevent the symptoms from returning.?     If your symptoms worsen, you develop severe headache, vomiting, high fever (>102), or are not improving in 7 days, please contact your primary care provider for an appointment or visit any of our convenient Walk-in Care or Urgent Care Centers to be seen which can be found on our website?here.?     Thanks again for choosing?us?as your health care partner,?   ?  BENNETT POLLARD MD?

## 2021-11-16 NOTE — TELEPHONE ENCOUNTER
patient is at the [facility] and they have not received the Z-kayy.  Please send again.  Please call patient when sent.  Thank you

## 2021-12-11 ENCOUNTER — E-VISIT (OUTPATIENT)
Dept: URGENT CARE | Facility: URGENT CARE | Age: 56
End: 2021-12-11
Payer: COMMERCIAL

## 2021-12-11 DIAGNOSIS — J01.90 ACUTE BACTERIAL SINUSITIS: Primary | ICD-10-CM

## 2021-12-11 DIAGNOSIS — B96.89 ACUTE BACTERIAL SINUSITIS: Primary | ICD-10-CM

## 2021-12-11 PROCEDURE — 99421 OL DIG E/M SVC 5-10 MIN: CPT | Performed by: PHYSICIAN ASSISTANT

## 2021-12-12 NOTE — PATIENT INSTRUCTIONS
Dear Saritha Pearson    After reviewing your responses, I've been able to diagnose you with?a sinus infection caused by bacteria.?     Based on your responses and diagnosis, I have prescribed augmentin to treat your symptoms. I have sent this to your pharmacy.?     It is also important to stay well hydrated, get lots of rest and take over-the-counter decongestants,?tylenol?or ibuprofen if you?are able to?take those medications per your primary care provider to help relieve discomfort.?     It is important that you take?all of?your prescribed medication even if your symptoms are improving after a few doses.? Taking?all of?your medicine helps prevent the symptoms from returning.?     If your symptoms worsen, you develop severe headache, vomiting, high fever (>102), or are not improving in 7 days, please contact your primary care provider for an appointment or visit any of our convenient Walk-in Care or Urgent Care Centers to be seen which can be found on our website?here.?     Thanks again for choosing?us?as your health care partner,?   ?  Trav Rios PA-C?

## 2021-12-25 ENCOUNTER — MYC MEDICAL ADVICE (OUTPATIENT)
Dept: FAMILY MEDICINE | Facility: CLINIC | Age: 56
End: 2021-12-25
Payer: COMMERCIAL

## 2022-02-01 ENCOUNTER — TRANSFERRED RECORDS (OUTPATIENT)
Dept: HEALTH INFORMATION MANAGEMENT | Facility: CLINIC | Age: 57
End: 2022-02-01
Payer: COMMERCIAL

## 2022-02-09 ENCOUNTER — TRANSFERRED RECORDS (OUTPATIENT)
Dept: HEALTH INFORMATION MANAGEMENT | Facility: CLINIC | Age: 57
End: 2022-02-09
Payer: COMMERCIAL

## 2022-02-21 ENCOUNTER — E-VISIT (OUTPATIENT)
Dept: URGENT CARE | Facility: URGENT CARE | Age: 57
End: 2022-02-21
Payer: COMMERCIAL

## 2022-02-21 DIAGNOSIS — B96.89 ACUTE BACTERIAL SINUSITIS: Primary | ICD-10-CM

## 2022-02-21 DIAGNOSIS — J01.90 ACUTE BACTERIAL SINUSITIS: Primary | ICD-10-CM

## 2022-02-21 PROCEDURE — 99207 PR NO CHARGE LOS: CPT | Performed by: NURSE PRACTITIONER

## 2022-02-21 NOTE — PATIENT INSTRUCTIONS
When to Use Antibiotics    Antibiotics are medicines used to treat infections caused by bacteria. They don t work for an illness caused by a virus. And they don't work for an allergic reaction. In fact, taking antibiotics for reasons other than an infection by bacteria can cause problems. You may have side effects from the medicine. And if you need an antibiotic in the future, it may not work well. This is because the bacteria can become immune to the medicine. You can also get a type of diarrhea that's hard to treat. This diarrhea is called C. diff.   When antibiotics likely won t help  Your healthcare provider won t usually give you antibiotics for the conditions listed below. You can help by not asking for them if you have:     A cold. This type of illness is caused by a virus. It can cause a runny nose, stuffed-up nose, sneezing, coughing, and headache. You may also have mild body aches and low fever. A cold gets better on its own in a few days to a week.    The flu (influenza). This is a respiratory illness caused by a virus. The flu usually goes away on its own in a week or so. It can cause fever, body aches, sore throat, and tiredness.    Bronchitis. This is an infection in the lungs. It is most often caused by a virus. You may have coughing, phlegm, body aches, and a low fever. A common type of bronchitis is known as a chest cold. This is called acute bronchitis. This often happens after you have a respiratory infection like a cold. Bronchitis can take weeks to go away. Antibiotics often don t help.    Most sore throats. Sore throats are most often caused by viruses. Your throat may feel scratchy or achy. It may hurt to swallow. You may also have a low fever and body aches. A sore throat usually gets better in a few days.    Most outer ear infections. An ear infection may be caused by a virus or bacteria. It causes pain in the ear. Antibiotics by mouth usually don t help. Low-dose antibiotic ear drops  work much better.    Some inner ear infections. An inner ear infection (otitis media) can be caused by a virus in the ear. It can also cause pain and a high fever. Most older children with low-grade fever don't need to be treated with antibiotics.    Most sinus infections. This is also known as sinusitis. This kind of infection causes sinus pain and swelling, and a runny nose. In most cases, it goes away on its own. Antibiotics don t make recovery quicker.    Allergic rhinitis. This is a set of symptoms caused by an allergic reaction. You may have sneezing, a runny nose, itchy or watery eyes, or a sore throat. Allergies are not treated with antibiotics.    Low fever. A mild fever that s less than 100.4 F (38 C) most likely doesn t need to be treated with antibiotics.   When antibiotics can help  Antibiotics can be used to treat:                                                       Strep throat. This is a throat infection caused by a certain type of bacteria. Symptoms of strep throat include a sore throat, white patches on the tonsils, red spots on the roof of the mouth, fever, body aches, and nausea and vomiting. Strep throat almost never causes a cough.    Urinary tract infection (UTI). This is an infection of the bladder and the tube that takes urine out of the body. It is caused by bacteria. It can cause burning pain and urine that s cloudy or tinted with blood. UTIs are very common. Antibiotics usually help treat them.    Some outer ear infections. In some cases, a healthcare provider may prescribe antibiotics by mouth for an ear infection. You may need a test to show the cause of the ear infection.    Some sinus infections. In some cases, your healthcare provider may give you antibiotics. He or she may first need to make sure your symptoms aren t caused by something else. This may be a virus, fungus, allergies, or air pollutants such as smoke.   Your healthcare provider may give you antibiotics if you have a  condition that can affect your immune system. This includes diabetes or cancer.  Self-care at home  If your infection can t be treated with antibiotics, you can take other steps to feel better. Try the remedies below. In general:     Rest and sleep as much as needed.    Drink water and other clear fluids.    Don t smoke. Stay away from smoke from other people.    Use over-the-counter medicine such as acetaminophen or ibuprofen to ease pain or fever, as directed by your healthcare provider.  To treat sinus pain or nasal stuffiness:    Put a warm, moist cloth on your face where you feel sinus pain or pressure.    Try a nasal spray with medicine or saline. Use as directed by your healthcare provider.    Breathe in steam from a hot shower.    Use a humidifier or cool mist vaporizer.   To quiet a cough:     Use a humidifier or cool mist vaporizer.    Breathe in steam from a hot shower.    Suck on cough lozenges.   To sooth a sore throat:     Suck on ice chips, frozen ice pops, or lozenges.    Use a sore throat spray.    Use a humidifier or cool mist vaporizer.    Gargle with saltwater.    Drink warm liquids.    Take ibuprofen to reduce swelling and pain.  To ease ear pain:     Hold a warm, moist washcloth on the ear for 10 minutes at a time.  [employer] last reviewed this educational content on 12/1/2019 2000-2021 [employer]. All rights reserved. This information is not intended as a substitute for professional medical care. Always follow your healthcare professional's instructions.          Sinusitis (Antibiotic Treatment)    The sinuses are air-filled spaces within the bones of the face. They connect to the inside of the nose. Sinusitis is an inflammation of the tissue that lines the sinuses. Sinusitis can occur during a cold. It can also happen due to allergies to pollens and other particles in the air. Sinusitis can cause symptoms of sinus congestion and a feeling of fullness. A sinus infection causes  fever, headache, and facial pain. There is often green or yellow fluid draining from the nose or into the back of the throat (post-nasal drip). You have been given antibiotics to treat this condition.   Home care    Take the full course of antibiotics as instructed. Don't stop taking them, even when you feel better.    Drink plenty of water, hot tea, and other liquids as directed by the healthcare provider. This may help thin nasal mucus. It also may help your sinuses drain fluids.    Heat may help soothe painful areas of your face. Use a towel soaked in hot water. Or,  the shower and direct the warm spray onto your face. Using a vaporizer along with a menthol rub at night may also help soothe symptoms.     An expectorant with guaifenesin may help thin nasal mucus and help your sinuses drain fluids. Talk with your provider or pharmacists before taking an over-the-counter (OTC) medicine if you have any questions about it or its side effects..    You can use an OTC decongestant, unless a similar medicine was prescribed to you. Nasal sprays work the fastest. Use one that contains phenylephrine or oxymetazoline. First blow your nose gently. Then use the spray. Don't use these medicines more often than directed on the label. If you do, your symptoms may get worse. You may also take pills that contain pseudoephedrine. Don t use products that combine multiple medicines. This is because side effects may be increased. Read labels. You can also ask the pharmacist for help. (People with high blood pressure should not use decongestants. They can raise blood pressure.) Talk with your provider or pharmacist if you have any questions about the medicine..    OTC antihistamines may help if allergies contributed to your sinusitis. Talk with your provider or pharmacist if you have any questions about the medicine..    Don't use nasal rinses or irrigation during an acute sinus infection, unless your healthcare provider tells  you to. Rinsing may spread the infection to other areas in your sinuses.    Use acetaminophen or ibuprofen to control pain, unless another pain medicine was prescribed to you. If you have chronic liver or kidney disease or ever had a stomach ulcer, talk with your healthcare provider before using these medicines. Never give aspirin to anyone under age 18 who is ill with a fever. It may cause severe liver damage.    Don't smoke. This can make symptoms worse.    Follow-up care  Follow up with your healthcare provider, or as advised.   When to seek medical advice  Call your healthcare provider if any of these occur:     Facial pain or headache that gets worse    Stiff neck    Unusual drowsiness or confusion    Swelling of your forehead or eyelids    Symptoms don't go away in 10 days    Vision problems, such as blurred or double vision    Fever of 100.4 F (38 C) or higher, or as directed by your healthcare provider  Call 911  Call 911 if any of these occur:     Seizure    Trouble breathing    Feeling dizzy or faint    Fingernails, skin or lips look blue, purple , or gray  Prevention  Here are steps you can take to help prevent an infection:     Keep good hand washing habits.    Don t have close contact with people who have sore throats, colds, or other upper respiratory infections.    Don t smoke, and stay away from secondhand smoke.    Stay up to date with of your vaccines.  [employer] last reviewed this educational content on 12/1/2019 2000-2021 [employer]. All rights reserved. This information is not intended as a substitute for professional medical care. Always follow your healthcare professional's instructions.        Dear Saritha Pearson    After reviewing your responses, I've been able to diagnose you with?a sinus infection caused by bacteria.?     Based on your responses and diagnosis, I have prescribed Augmentin to treat your symptoms. Zpak is not recommended evidenced based treatment for  sinusitis. I have sent this to your pharmacy.?     It is also important to stay well hydrated, get lots of rest and take over-the-counter decongestants,?tylenol?or ibuprofen if you?are able to?take those medications per your primary care provider to help relieve discomfort.?     It is important that you take?all of?your prescribed medication even if your symptoms are improving after a few doses.? Taking?all of?your medicine helps prevent the symptoms from returning.?     If your symptoms worsen, you develop severe headache, vomiting, high fever (>102), or are not improving in 7 days, please contact your primary care provider for an appointment or visit any of our convenient Walk-in Care or Urgent Care Centers to be seen which can be found on our website?here.?     Thanks again for choosing?us?as your health care partner,?   ?  ALEX Valadez CNP?

## 2022-03-03 ENCOUNTER — HOSPITAL ENCOUNTER (OUTPATIENT)
Dept: NUCLEAR MEDICINE | Facility: CLINIC | Age: 57
Setting detail: NUCLEAR MEDICINE
Discharge: HOME | End: 2022-03-03
Attending: PHYSICIAN ASSISTANT | Admitting: PHYSICIAN ASSISTANT
Payer: COMMERCIAL

## 2022-03-03 DIAGNOSIS — R68.81 EARLY SATIETY: ICD-10-CM

## 2022-03-03 DIAGNOSIS — R10.13 EPIGASTRIC PAIN: ICD-10-CM

## 2022-03-03 DIAGNOSIS — K58.1 IRRITABLE BOWEL SYNDROME WITH CONSTIPATION: ICD-10-CM

## 2022-03-03 PROCEDURE — 78264 GASTRIC EMPTYING IMG STUDY: CPT

## 2022-03-03 PROCEDURE — A9541 TC99M SULFUR COLLOID: HCPCS | Performed by: PHYSICIAN ASSISTANT

## 2022-03-03 PROCEDURE — 343N000001 HC RX 343: Performed by: PHYSICIAN ASSISTANT

## 2022-03-03 RX ADMIN — Medication 1 MILLICURIE: at 07:47

## 2022-03-04 ENCOUNTER — E-VISIT (OUTPATIENT)
Dept: URGENT CARE | Facility: URGENT CARE | Age: 57
End: 2022-03-04
Payer: COMMERCIAL

## 2022-03-04 DIAGNOSIS — J01.90 ACUTE SINUSITIS WITH SYMPTOMS > 10 DAYS: Primary | ICD-10-CM

## 2022-03-04 PROCEDURE — 99207 PR NO CHARGE LOS: CPT | Performed by: PHYSICIAN ASSISTANT

## 2022-03-04 NOTE — PATIENT INSTRUCTIONS
You may want to try a nasal lavage (also known as nasal irrigation). You can find over-the-counter products, such as Neti-Pot, at retail locations or make your own at home. Instructions for homemade nasal lavage and more information on the process are available online at http://www.aafp.org/afp/2009/1115/p1121.html.      Sinusitis (Antibiotic Treatment)    The sinuses are air-filled spaces within the bones of the face. They connect to the inside of the nose. Sinusitis is an inflammation of the tissue that lines the sinuses. Sinusitis can occur during a cold. It can also happen due to allergies to pollens and other particles in the air. Sinusitis can cause symptoms of sinus congestion and a feeling of fullness. A sinus infection causes fever, headache, and facial pain. There is often green or yellow fluid draining from the nose or into the back of the throat (post-nasal drip). You have been given antibiotics to treat this condition.   Home care    Take the full course of antibiotics as instructed. Don't stop taking them, even when you feel better.    Drink plenty of water, hot tea, and other liquids as directed by the healthcare provider. This may help thin nasal mucus. It also may help your sinuses drain fluids.    Heat may help soothe painful areas of your face. Use a towel soaked in hot water. Or,  the shower and direct the warm spray onto your face. Using a vaporizer along with a menthol rub at night may also help soothe symptoms.     An expectorant with guaifenesin may help thin nasal mucus and help your sinuses drain fluids. Talk with your provider or pharmacists before taking an over-the-counter (OTC) medicine if you have any questions about it or its side effects..    You can use an OTC decongestant, unless a similar medicine was prescribed to you. Nasal sprays work the fastest. Use one that contains phenylephrine or oxymetazoline. First blow your nose gently. Then use the spray. Don't use these  medicines more often than directed on the label. If you do, your symptoms may get worse. You may also take pills that contain pseudoephedrine. Don t use products that combine multiple medicines. This is because side effects may be increased. Read labels. You can also ask the pharmacist for help. (People with high blood pressure should not use decongestants. They can raise blood pressure.) Talk with your provider or pharmacist if you have any questions about the medicine..    OTC antihistamines may help if allergies contributed to your sinusitis. Talk with your provider or pharmacist if you have any questions about the medicine..    Don't use nasal rinses or irrigation during an acute sinus infection, unless your healthcare provider tells you to. Rinsing may spread the infection to other areas in your sinuses.    Use acetaminophen or ibuprofen to control pain, unless another pain medicine was prescribed to you. If you have chronic liver or kidney disease or ever had a stomach ulcer, talk with your healthcare provider before using these medicines. Never give aspirin to anyone under age 18 who is ill with a fever. It may cause severe liver damage.    Don't smoke. This can make symptoms worse.    Follow-up care  Follow up with your healthcare provider, or as advised.   When to seek medical advice  Call your healthcare provider if any of these occur:     Facial pain or headache that gets worse    Stiff neck    Unusual drowsiness or confusion    Swelling of your forehead or eyelids    Symptoms don't go away in 10 days    Vision problems, such as blurred or double vision    Fever of 100.4 F (38 C) or higher, or as directed by your healthcare provider  Call 911  Call 911 if any of these occur:     Seizure    Trouble breathing    Feeling dizzy or faint    Fingernails, skin or lips look blue, purple , or gray  Prevention  Here are steps you can take to help prevent an infection:     Keep good hand washing habits.    Don t  have close contact with people who have sore throats, colds, or other upper respiratory infections.    Don t smoke, and stay away from secondhand smoke.    Stay up to date with of your vaccines.  [employer] last reviewed this educational content on 12/1/2019 2000-2021 [employer]. All rights reserved. This information is not intended as a substitute for professional medical care. Always follow your healthcare professional's instructions.        Dear Saritha Pearson    After reviewing your responses, I've been able to diagnose you with?a sinus infection caused by bacteria.?     Based on your responses and diagnosis, I have prescribed Agumentin to treat your symptoms. I have sent this to your pharmacy.?     It is also important to stay well hydrated, get lots of rest and take over-the-counter decongestants,?tylenol?or ibuprofen if you?are able to?take those medications per your primary care provider to help relieve discomfort.?     It is important that you take?all of?your prescribed medication even if your symptoms are improving after a few doses.? Taking?all of?your medicine helps prevent the symptoms from returning.?     If your symptoms worsen, you develop severe headache, vomiting, high fever (>102), or are not improving in 7 days, please contact your primary care provider for an appointment or visit any of our convenient Walk-in Care or Urgent Care Centers to be seen which can be found on our website?here.?     Thanks again for choosing?us?as your health care partner,?   ?  Maritza Humphrey PA-C?

## 2022-03-24 ENCOUNTER — TRANSFERRED RECORDS (OUTPATIENT)
Dept: HEALTH INFORMATION MANAGEMENT | Facility: CLINIC | Age: 57
End: 2022-03-24
Payer: COMMERCIAL

## 2022-03-28 ENCOUNTER — OFFICE VISIT (OUTPATIENT)
Dept: FAMILY MEDICINE | Facility: CLINIC | Age: 57
End: 2022-03-28
Payer: COMMERCIAL

## 2022-03-28 VITALS
HEART RATE: 97 BPM | WEIGHT: 215 LBS | TEMPERATURE: 99 F | BODY MASS INDEX: 33.74 KG/M2 | OXYGEN SATURATION: 95 % | HEIGHT: 67 IN | SYSTOLIC BLOOD PRESSURE: 120 MMHG | DIASTOLIC BLOOD PRESSURE: 80 MMHG

## 2022-03-28 DIAGNOSIS — L90.5 SCAR OF ABDOMEN: ICD-10-CM

## 2022-03-28 DIAGNOSIS — Z01.818 PREOP GENERAL PHYSICAL EXAM: Primary | ICD-10-CM

## 2022-03-28 DIAGNOSIS — Z41.1 ENCOUNTER FOR COSMETIC PROCEDURE: ICD-10-CM

## 2022-03-28 LAB
ANION GAP SERPL CALCULATED.3IONS-SCNC: 3 MMOL/L (ref 3–14)
BASOPHILS # BLD AUTO: 0 10E3/UL (ref 0–0.2)
BASOPHILS NFR BLD AUTO: 0 %
BUN SERPL-MCNC: 12 MG/DL (ref 7–30)
CALCIUM SERPL-MCNC: 10.3 MG/DL (ref 8.5–10.1)
CHLORIDE BLD-SCNC: 107 MMOL/L (ref 94–109)
CO2 SERPL-SCNC: 29 MMOL/L (ref 20–32)
CREAT SERPL-MCNC: 0.69 MG/DL (ref 0.52–1.04)
EOSINOPHIL # BLD AUTO: 0.1 10E3/UL (ref 0–0.7)
EOSINOPHIL NFR BLD AUTO: 1 %
ERYTHROCYTE [DISTWIDTH] IN BLOOD BY AUTOMATED COUNT: 13.4 % (ref 10–15)
GFR SERPL CREATININE-BSD FRML MDRD: >90 ML/MIN/1.73M2
GLUCOSE BLD-MCNC: 105 MG/DL (ref 70–99)
HCT VFR BLD AUTO: 40.3 % (ref 35–47)
HGB BLD-MCNC: 12.8 G/DL (ref 11.7–15.7)
LYMPHOCYTES # BLD AUTO: 2.9 10E3/UL (ref 0.8–5.3)
LYMPHOCYTES NFR BLD AUTO: 45 %
MCH RBC QN AUTO: 25.6 PG (ref 26.5–33)
MCHC RBC AUTO-ENTMCNC: 31.8 G/DL (ref 31.5–36.5)
MCV RBC AUTO: 81 FL (ref 78–100)
MONOCYTES # BLD AUTO: 0.5 10E3/UL (ref 0–1.3)
MONOCYTES NFR BLD AUTO: 7 %
NEUTROPHILS # BLD AUTO: 3 10E3/UL (ref 1.6–8.3)
NEUTROPHILS NFR BLD AUTO: 47 %
PLATELET # BLD AUTO: 326 10E3/UL (ref 150–450)
POTASSIUM BLD-SCNC: 4 MMOL/L (ref 3.4–5.3)
RBC # BLD AUTO: 5 10E6/UL (ref 3.8–5.2)
SODIUM SERPL-SCNC: 139 MMOL/L (ref 133–144)
WBC # BLD AUTO: 6.4 10E3/UL (ref 4–11)

## 2022-03-28 PROCEDURE — 93000 ELECTROCARDIOGRAM COMPLETE: CPT | Performed by: FAMILY MEDICINE

## 2022-03-28 PROCEDURE — 85025 COMPLETE CBC W/AUTO DIFF WBC: CPT | Performed by: FAMILY MEDICINE

## 2022-03-28 PROCEDURE — 80048 BASIC METABOLIC PNL TOTAL CA: CPT | Performed by: FAMILY MEDICINE

## 2022-03-28 PROCEDURE — 99214 OFFICE O/P EST MOD 30 MIN: CPT | Performed by: FAMILY MEDICINE

## 2022-03-28 PROCEDURE — 36415 COLL VENOUS BLD VENIPUNCTURE: CPT | Performed by: FAMILY MEDICINE

## 2022-03-28 RX ORDER — TIMOLOL MALEATE 5 MG/ML
SOLUTION/ DROPS OPHTHALMIC
COMMUNITY
Start: 2022-03-25

## 2022-03-28 RX ORDER — POLYETHYLENE GLYCOL 3350 17 G/17G
POWDER, FOR SOLUTION ORAL
COMMUNITY
Start: 2021-10-05

## 2022-03-28 RX ORDER — CYCLOSPORINE 0.5 MG/ML
EMULSION OPHTHALMIC
COMMUNITY
Start: 2022-03-18

## 2022-03-28 RX ORDER — TRETINOIN 0.1 %
CREAM (GRAM) TOPICAL
COMMUNITY
Start: 2022-02-16

## 2022-03-28 RX ORDER — LATANOPROST 50 UG/ML
SOLUTION/ DROPS OPHTHALMIC
COMMUNITY
Start: 2022-03-22

## 2022-03-28 RX ORDER — DICYCLOMINE HYDROCHLORIDE 10 MG/1
CAPSULE ORAL
COMMUNITY
Start: 2021-10-05 | End: 2022-11-08

## 2022-03-28 RX ORDER — DEXTROAMPHETAMINE SACCHARATE, AMPHETAMINE ASPARTATE, DEXTROAMPHETAMINE SULFATE AND AMPHETAMINE SULFATE 2.5; 2.5; 2.5; 2.5 MG/1; MG/1; MG/1; MG/1
10 TABLET ORAL
COMMUNITY
Start: 2021-09-22

## 2022-03-28 ASSESSMENT — PAIN SCALES - GENERAL: PAINLEVEL: NO PAIN (0)

## 2022-03-28 NOTE — PATIENT INSTRUCTIONS
Preparing for Your Surgery  Getting started  A nurse will call you to review your health history and instructions. They will give you an arrival time based on your scheduled surgery time. Please be ready to share:    Your doctor's clinic name and phone number    Your medical, surgical and anesthesia history    A list of allergies and sensitivities    A list of medicines, including herbal treatments and over-the-counter drugs    Whether the patient has a legal guardian (ask how to send us the papers in advance)  Please tell us if you're pregnant--or if there's any chance you might be pregnant. Some surgeries may injure a fetus (unborn baby), so they require a pregnancy test. Surgeries that are safe for a fetus don't always need a test, and you can choose whether to have one.   If you have a child who's having surgery, please ask for a copy of Preparing for Your Child's Surgery.    Preparing for surgery    Within 30 days of surgery: Have a pre-op exam (sometimes called an H&P, or History and Physical). This can be done at a clinic or pre-operative center.  ? If you're having a , you may not need this exam. Talk to your care team.    At your pre-op exam, talk to your care team about all medicines you take. If you need to stop any medicines before surgery, ask when to start taking them again.  ? We do this for your safety. Many medicines can make you bleed too much during surgery. Some change how well surgery (anesthesia) drugs work.    Call your insurance company to let them know you're having surgery. (If you don't have insurance, call [phone].)    Call your clinic if there's any change in your health. This includes signs of a cold or flu (sore throat, runny nose, cough, rash, fever). It also includes a scrape or scratch near the surgery site.    If you have questions on the day of surgery, call your hospital or surgery center.  COVID testing  You may need to be tested for COVID-19 before having  surgery. If so, your surgical team will give you instructions for scheduling this test, separate from your preoperative history and physical.  Eating and drinking guidelines  For your safety: Unless your surgeon tells you otherwise, follow the guidelines below.    Eat and drink as usual until 8 hours before surgery. After that, no food or milk.    Drink clear liquids until 2 hours before surgery. These are liquids you can see through, like water, Gatorade and Propel Water. You may also have black coffee and tea (no cream or milk).    Nothing by mouth within 2 hours of surgery. This includes gum, candy and breath mints.    If you drink alcohol: Stop drinking it the night before surgery.    If your care team tells you to take medicine on the morning of surgery, it's okay to take it with a sip of water.  Preventing infection    Shower or bathe the night before and morning of your surgery. Follow the instructions your clinic gave you. (If no instructions, use regular soap.)    Don't shave or clip hair near your surgery site. We'll remove the hair if needed.    Don't smoke or vape the morning of surgery. You may chew nicotine gum up to 2 hours before surgery. A nicotine patch is okay.  ? Note: Some surgeries require you to completely quit smoking and nicotine. Check with your surgeon.    Your care team will make every effort to keep you safe from infection. We will:  ? Clean our hands often with soap and water (or an alcohol-based hand rub).  ? Clean the skin at your surgery site with a special soap that kills germs.  ? Give you a special gown to keep you warm. (Cold raises the risk of infection.)  ? Wear special hair covers, masks, gowns and gloves during surgery.  ? Give antibiotic medicine, if prescribed. Not all surgeries need antibiotics.  What to bring on the day of surgery    Photo ID and insurance card    Copy of your health care directive, if you have one    Glasses and hearing aides (bring cases)  ? You can't  wear contacts during surgery    Inhaler and eye drops, if you use them (tell us about these when you arrive)    CPAP machine or breathing device, if you use them    A few personal items, if spending the night    If you have . . .  ? A pacemaker, ICD (cardiac defibrillator) or other implant: Bring the ID card.  ? An implanted stimulator: Bring the remote control.  ? A legal guardian: Bring a copy of the certified (court-stamped) guardianship papers.  Please remove any jewelry, including body piercings. Leave jewelry and other valuables at home.  If you're going home the day of surgery    You must have a responsible adult drive you home. They should stay with you overnight as well.    If you don't have someone to stay with you, and you aren't safe to go home alone, we may keep you overnight. Insurance often won't pay for this.  After surgery  If it's hard to control your pain or you need more pain medicine, please call your surgeon's office.  Questions?   If you have any questions for your care team, list them here: _________________________________________________________________________________________________________________________________________________________________________ ____________________________________ ____________________________________ ____________________________________  For informational purposes only. Not to replace the advice of your health care provider. Copyright   2003, 2019 [facility]. All rights reserved. Clinically reviewed by Urmila Del Cid MD. [employer] [id number] - REV 07/21.

## 2022-03-28 NOTE — PROGRESS NOTES
[facility]  [address] [address] [address]  Phone: [phone]  Primary Provider: Lalo Gold  Pre-op Performing Provider: KATARZYNA CAMPOS      PREOPERATIVE EVALUATION:  Today's date: 3/28/2022    Saritha Pearson is a 56 year old female who presents for a preoperative evaluation.    Surgical Information:  Surgery/Procedure: Rhinoplasty  Surgery Location: [facility]   Surgeon: Dr Jalil Vargas  Surgery Date: 4/7/2022  Time of Surgery: TBD  Where patient plans to recover: At home with family  Fax number for surgical facility: [phone]    Type of Anesthesia Anticipated: General    Assessment & Plan     The proposed surgical procedure is considered INTERMEDIATE risk.    Preop general physical exam  There is no contraindication for the surgery  - EKG 12-lead complete w/read - Clinics  - Basic metabolic panel  (Ca, Cl, CO2, Creat, Gluc, K, Na, BUN); Future  - CBC with platelets and differential; Future    Encounter for cosmetic procedure  Patient is going for rhinoplasty    Scar of abdomen  Healed surgical scar, patient is going for cosmetic repair         Risks and Recommendations:  The patient has the following additional risks and recommendations for perioperative complications:   - No identified additional risk factors other than previously addressed    Medication Instructions:  Patient is to take all scheduled medications on the day of surgery    RECOMMENDATION:  APPROVAL GIVEN to proceed with proposed procedure, without further diagnostic evaluation.                      Subjective     HPI related to upcoming procedure:       Patient is here for preop evaluation for upcomign procedure.  She is going for rhinoplasty and abdominal scar repair    She has hx of hysterectomy in 2010 due to fibroid     She is doing well, she denies any current concern.  She is very active  Preop Questions 3/28/2022   1. Have you ever had a heart attack or stroke? No   2. Have you ever  had surgery on your heart or blood vessels, such as a stent placement, a coronary artery bypass, or surgery on an artery in your head, neck, heart, or legs? No   3. Do you have chest pain with activity? No   4. Do you have a history of  heart failure? No   5. Do you currently have a cold, bronchitis or symptoms of other infection? No   6. Do you have a cough, shortness of breath, or wheezing? No   7. Do you or anyone in your family have previous history of blood clots? No   8. Do you or does anyone in your family have a serious bleeding problem such as prolonged bleeding following surgeries or cuts? No   9. Have you ever had problems with anemia or been told to take iron pills? No   10. Have you had any abnormal blood loss such as black, tarry or bloody stools, or abnormal vaginal bleeding? No   11. Have you ever had a blood transfusion? No   12. Are you willing to have a blood transfusion if it is medically needed before, during, or after your surgery? Yes   13. Have you or any of your relatives ever had problems with anesthesia? No   14. Do you have sleep apnea, excessive snoring or daytime drowsiness? No   15. Do you have any artifical heart valves or other implanted medical devices like a pacemaker, defibrillator, or continuous glucose monitor? No   16. Do you have artificial joints? No   17. Are you allergic to latex? No   18. Is there any chance that you may be pregnant? No       Health Care Directive:  Patient does not have a Health Care Directive or Living Will: Discussed advance care planning with patient; however, patient declined at this time.    Preoperative Review of :   reviewed - controlled substances prescribed by other outside provider(s).      Status of Chronic Conditions:  See problem list for active medical problems.  Problems all longstanding and stable, except as noted/documented.  See ROS for pertinent symptoms related to these conditions.      Review of Systems  CONSTITUTIONAL: NEGATIVE  for fever, chills, change in weight  INTEGUMENTARY/SKIN: NEGATIVE for worrisome rashes, moles or lesions  EYES: NEGATIVE for vision changes or irritation  ENT/MOUTH: NEGATIVE for ear, mouth and throat problems  RESP: NEGATIVE for significant cough or SOB  CV: NEGATIVE for chest pain, palpitations or peripheral edema  GI: NEGATIVE for nausea, abdominal pain, heartburn, or change in bowel habits  : NEGATIVE for frequency, dysuria, or hematuria  MUSCULOSKELETAL: NEGATIVE for significant arthralgias or myalgia  NEURO: NEGATIVE for weakness, dizziness or paresthesias  ENDOCRINE: NEGATIVE for temperature intolerance, skin/hair changes  HEME: NEGATIVE for bleeding problems  PSYCHIATRIC: NEGATIVE for changes in mood or affect    Patient Active Problem List    Diagnosis Date Noted     Hormone replacement therapy 01/23/2021     Priority: Medium     Sebaceous cyst 02/25/2020     Priority: Medium     Added automatically from request for surgery [id number]       Other postprocedural status(V45.89) 07/21/2016     Priority: Medium     Right knee pain 04/28/2016     Priority: Medium     Overweight 12/07/2015     Priority: Medium     Constipation 05/18/2015     Priority: Medium      Past Medical History:   Diagnosis Date     Asthma     asthma sx after resp infections     Constipation      Gastro-oesophageal reflux disease      PONV (postoperative nausea and vomiting)      Vaginal high risk HPV DNA test positive 2016, 2018, 2019    see problem list     Past Surgical History:   Procedure Laterality Date     ABDOMEN SURGERY      abdominal wall mass     ABDOMINOPLASTY      pt has post op infection     COSMETIC SURGERY       EXCISE LESION TRUNK  10/4/2011    Procedure:EXCISE LESION TRUNK; Excision Abdominal Wall Mass ; Surgeon:CARLITO BETTS; Location:[facility]     GYN SURGERY       HYSTERECTOMY       HYSTERECTOMY, PAP NO LONGER INDICATED  11/02/2018     Current Outpatient Medications   Medication Sig Dispense Refill      amphetamine-dextroamphetamine (ADDERALL) 10 MG tablet Take 10 mg by mouth       dicyclomine (BENTYL) 10 MG capsule take 1 capsule by oral route up to 4 times per day for abdominal cramping       omeprazole (PRILOSEC) 20 MG DR capsule take 1 capsule by oral route 2 times every day 30 minutes to 1 hour before first meal and 12 hours later       polyethylene glycol (MIRALAX) 17 GM/Dose powder TAKE 3 CAPFULS BY MOUTH 3 times DAILY for constipation       albuterol (PROAIR HFA/PROVENTIL HFA/VENTOLIN HFA) 108 (90 BASE) MCG/ACT Inhaler Inhale 2 puffs into the lungs every 4 hours as needed for shortness of breath / dyspnea or wheezing (Patient not taking: Reported on 10/14/2021) 1 Inhaler 1     diclofenac (VOLTAREN) 1 % topical gel Apply 4 g topically 4 times daily (Patient not taking: Reported on 10/14/2021) 150 g 0     estradiol (ESTRACE) 0.5 MG tablet Take 1 tablet (0.5 mg) by mouth daily (Patient not taking: Reported on 10/14/2021) 90 tablet 3     latanoprost (XALATAN) 0.005 % ophthalmic solution        RESTASIS 0.05 % ophthalmic emulsion        RETIN-A 0.1 % external cream        timolol maleate (TIMOPTIC) 0.5 % ophthalmic solution          Allergies   Allergen Reactions     Sulfa Drugs Hives, Itching and Rash        Social History     Tobacco Use     Smoking status: Never Smoker     Smokeless tobacco: Never Used   Substance Use Topics     Alcohol use: Yes     Comment: occasionally     Family History   Problem Relation Age of Onset     Cerebrovascular Disease Father 60     Cerebrovascular Disease Maternal Grandmother      Cancer - colorectal Maternal Grandfather 50     Cancer Paternal Grandmother      Cancer Paternal Grandfather      Cancer - colorectal Sister 40     Breast Cancer Paternal Aunt      Breast Cancer Paternal Aunt      Breast Cancer Paternal Aunt      Breast Cancer Paternal Aunt      Breast Cancer Paternal Aunt      Breast Cancer Other      History   Drug Use No         Objective     BP (!) 153/91 (BP  "Location: Left arm, Patient Position: Sitting, Cuff Size: Adult Large)   Pulse 97   Temp 99  F (37.2  C) (Tympanic)   Ht 1.702 m (5' 7\")   Wt 97.5 kg (215 lb)   SpO2 95%   BMI 33.67 kg/m      Physical Exam    GENERAL APPEARANCE: healthy, alert and no distress     EYES: EOMI, PERRL     HENT: ear canals and TM's normal and nose and mouth without ulcers or lesions     NECK: no adenopathy, no asymmetry, masses, or scars and thyroid normal to palpation     RESP: lungs clear to auscultation - no rales, rhonchi or wheezes     CV: regular rates and rhythm, normal S1 S2, no S3 or S4 and no murmur, click or rub     ABDOMEN:  soft, nontender, no HSM or masses and bowel sounds normal     MS: extremities normal- no gross deformities noted, no evidence of inflammation in joints, FROM in all extremities.     SKIN: no suspicious lesions or rashes     NEURO: Normal strength and tone, sensory exam grossly normal, mentation intact and speech normal     PSYCH: mentation appears normal. and affect normal/bright     LYMPHATICS: No cervical adenopathy    No results for input(s): HGB, PLT, INR, NA, POTASSIUM, CR, A1C in the last 87532 hours.     Diagnostics:  Labs pending at this time.  Results will be reviewed when available.   EKG: appears normal, NSR, normal axis, normal intervals, no acute ST/T changes c/w ischemia, no LVH by voltage criteria, unchanged from previous tracings    Revised Cardiac Risk Index (RCRI):  The patient has the following serious cardiovascular risks for perioperative complications:   - No serious cardiac risks = 0 points     RCRI Interpretation: 0 points: Class I (very low risk - 0.4% complication rate)           Signed Electronically by: Shivam Pulido MD  Copy of this evaluation report is provided to requesting physician.      "

## 2022-04-23 ENCOUNTER — HEALTH MAINTENANCE LETTER (OUTPATIENT)
Age: 57
End: 2022-04-23

## 2022-05-02 ENCOUNTER — E-VISIT (OUTPATIENT)
Dept: URGENT CARE | Facility: URGENT CARE | Age: 57
End: 2022-05-02
Payer: COMMERCIAL

## 2022-05-02 DIAGNOSIS — B96.89 ACUTE BACTERIAL SINUSITIS: Primary | ICD-10-CM

## 2022-05-02 DIAGNOSIS — J01.90 ACUTE BACTERIAL SINUSITIS: Primary | ICD-10-CM

## 2022-05-02 PROCEDURE — 99207 PR NO CHARGE LOS: CPT | Performed by: NURSE PRACTITIONER

## 2022-05-02 NOTE — PATIENT INSTRUCTIONS
Sinusitis (Antibiotic Treatment)    The sinuses are air-filled spaces within the bones of the face. They connect to the inside of the nose. Sinusitis is an inflammation of the tissue that lines the sinuses. Sinusitis can occur during a cold. It can also happen due to allergies to pollens and other particles in the air. Sinusitis can cause symptoms of sinus congestion and a feeling of fullness. A sinus infection causes fever, headache, and facial pain. There is often green or yellow fluid draining from the nose or into the back of the throat (post-nasal drip). You have been given antibiotics to treat this condition.   Home care  Take the full course of antibiotics as instructed. Don't stop taking them, even when you feel better.  Drink plenty of water, hot tea, and other liquids as directed by the healthcare provider. This may help thin nasal mucus. It also may help your sinuses drain fluids.  Heat may help soothe painful areas of your face. Use a towel soaked in hot water. Or,  the shower and direct the warm spray onto your face. Using a vaporizer along with a menthol rub at night may also help soothe symptoms.   An expectorant with guaifenesin may help thin nasal mucus and help your sinuses drain fluids. Talk with your provider or pharmacists before taking an over-the-counter (OTC) medicine if you have any questions about it or its side effects..  You can use an OTC decongestant, unless a similar medicine was prescribed to you. Nasal sprays work the fastest. Use one that contains phenylephrine or oxymetazoline. First blow your nose gently. Then use the spray. Don't use these medicines more often than directed on the label. If you do, your symptoms may get worse. You may also take pills that contain pseudoephedrine. Don t use products that combine multiple medicines. This is because side effects may be increased. Read labels. You can also ask the pharmacist for help. (People with high blood pressure  should not use decongestants. They can raise blood pressure.) Talk with your provider or pharmacist if you have any questions about the medicine..  OTC antihistamines may help if allergies contributed to your sinusitis. Talk with your provider or pharmacist if you have any questions about the medicine..  Don't use nasal rinses or irrigation during an acute sinus infection, unless your healthcare provider tells you to. Rinsing may spread the infection to other areas in your sinuses.  Use acetaminophen or ibuprofen to control pain, unless another pain medicine was prescribed to you. If you have chronic liver or kidney disease or ever had a stomach ulcer, talk with your healthcare provider before using these medicines. Never give aspirin to anyone under age 18 who is ill with a fever. It may cause severe liver damage.  Don't smoke. This can make symptoms worse.    Follow-up care  Follow up with your healthcare provider, or as advised.   When to seek medical advice  Call your healthcare provider if any of these occur:   Facial pain or headache that gets worse  Stiff neck  Unusual drowsiness or confusion  Swelling of your forehead or eyelids  Symptoms don't go away in 10 days  Vision problems, such as blurred or double vision  Fever of 100.4 F (38 C) or higher, or as directed by your healthcare provider  Call 911  Call 911 if any of these occur:   Seizure  Trouble breathing  Feeling dizzy or faint  Fingernails, skin or lips look blue, purple , or gray  Prevention  Here are steps you can take to help prevent an infection:   Keep good hand washing habits.  Don t have close contact with people who have sore throats, colds, or other upper respiratory infections.  Don t smoke, and stay away from secondhand smoke.  Stay up to date with of your vaccines.  [employer] last reviewed this educational content on 12/1/2019 2000-2021 [employer]. All rights reserved. This information is not intended as a substitute for  professional medical care. Always follow your healthcare professional's instructions.        Dear Saritha Pearson    After reviewing your responses, I've been able to diagnose you with?a sinus infection caused by bacteria.?     Based on your responses and diagnosis, I have prescribed Augmentin to treat your symptoms. I have sent this to your pharmacy.?     It is also important to stay well hydrated, get lots of rest and take over-the-counter decongestants,?tylenol?or ibuprofen if you?are able to?take those medications per your primary care provider to help relieve discomfort.?     It is important that you take?all of?your prescribed medication even if your symptoms are improving after a few doses.? Taking?all of?your medicine helps prevent the symptoms from returning.?     If your symptoms worsen, you develop severe headache, vomiting, high fever (>102), or are not improving in 7 days, please contact your primary care provider for an appointment or visit any of our convenient Walk-in Care or Urgent Care Centers to be seen which can be found on our website?here.?     Thanks again for choosing?us?as your health care partner,?   ?  ALEX Valadez CNP?

## 2022-07-18 ENCOUNTER — E-VISIT (OUTPATIENT)
Dept: FAMILY MEDICINE | Facility: CLINIC | Age: 57
End: 2022-07-18
Payer: COMMERCIAL

## 2022-07-18 DIAGNOSIS — J01.90 ACUTE BACTERIAL SINUSITIS: Primary | ICD-10-CM

## 2022-07-18 DIAGNOSIS — B96.89 ACUTE BACTERIAL SINUSITIS: Primary | ICD-10-CM

## 2022-07-18 PROCEDURE — 99421 OL DIG E/M SVC 5-10 MIN: CPT | Performed by: PHYSICIAN ASSISTANT

## 2022-08-13 ENCOUNTER — E-VISIT (OUTPATIENT)
Dept: URGENT CARE | Facility: URGENT CARE | Age: 57
End: 2022-08-13
Payer: COMMERCIAL

## 2022-08-13 DIAGNOSIS — J01.90 ACUTE SINUSITIS WITH SYMPTOMS > 10 DAYS: Primary | ICD-10-CM

## 2022-08-13 PROCEDURE — 99421 OL DIG E/M SVC 5-10 MIN: CPT | Performed by: NURSE PRACTITIONER

## 2022-08-13 NOTE — PATIENT INSTRUCTIONS
Sinusitis (Antibiotic Treatment)    The sinuses are air-filled spaces within the bones of the face. They connect to the inside of the nose. Sinusitis is an inflammation of the tissue that lines the sinuses. Sinusitis can occur during a cold. It can also happen due to allergies to pollens and other particles in the air. Sinusitis can cause symptoms of sinus congestion and a feeling of fullness. A sinus infection causes fever, headache, and facial pain. There is often green or yellow fluid draining from the nose or into the back of the throat (post-nasal drip). You have been given antibiotics to treat this condition.   Home care  Take the full course of antibiotics as instructed. Don't stop taking them, even when you feel better.  Drink plenty of water, hot tea, and other liquids as directed by the healthcare provider. This may help thin nasal mucus. It also may help your sinuses drain fluids.  Heat may help soothe painful areas of your face. Use a towel soaked in hot water. Or,  the shower and direct the warm spray onto your face. Using a vaporizer along with a menthol rub at night may also help soothe symptoms.   An expectorant with guaifenesin may help thin nasal mucus and help your sinuses drain fluids. Talk with your provider or pharmacists before taking an over-the-counter (OTC) medicine if you have any questions about it or its side effects..  You can use an OTC decongestant, unless a similar medicine was prescribed to you. Nasal sprays work the fastest. Use one that contains phenylephrine or oxymetazoline. First blow your nose gently. Then use the spray. Don't use these medicines more often than directed on the label. If you do, your symptoms may get worse. You may also take pills that contain pseudoephedrine. Don t use products that combine multiple medicines. This is because side effects may be increased. Read labels. You can also ask the pharmacist for help. (People with high blood pressure  should not use decongestants. They can raise blood pressure.) Talk with your provider or pharmacist if you have any questions about the medicine..  OTC antihistamines may help if allergies contributed to your sinusitis. Talk with your provider or pharmacist if you have any questions about the medicine..  Don't use nasal rinses or irrigation during an acute sinus infection, unless your healthcare provider tells you to. Rinsing may spread the infection to other areas in your sinuses.  Use acetaminophen or ibuprofen to control pain, unless another pain medicine was prescribed to you. If you have chronic liver or kidney disease or ever had a stomach ulcer, talk with your healthcare provider before using these medicines. Never give aspirin to anyone under age 18 who is ill with a fever. It may cause severe liver damage.  Don't smoke. This can make symptoms worse.    Follow-up care  Follow up with your healthcare provider, or as advised.   When to seek medical advice  Call your healthcare provider if any of these occur:   Facial pain or headache that gets worse  Stiff neck  Unusual drowsiness or confusion  Swelling of your forehead or eyelids  Symptoms don't go away in 10 days  Vision problems, such as blurred or double vision  Fever of 100.4 F (38 C) or higher, or as directed by your healthcare provider  Call 911  Call 911 if any of these occur:   Seizure  Trouble breathing  Feeling dizzy or faint  Fingernails, skin or lips look blue, purple , or gray  Prevention  Here are steps you can take to help prevent an infection:   Keep good hand washing habits.  Don t have close contact with people who have sore throats, colds, or other upper respiratory infections.  Don t smoke, and stay away from secondhand smoke.  Stay up to date with of your vaccines.  [employer] last reviewed this educational content on 12/1/2019 2000-2021 [employer]. All rights reserved. This information is not intended as a substitute for  professional medical care. Always follow your healthcare professional's instructions.        Dear Saritha Pearson    After reviewing your responses, I've been able to diagnose you with?a sinus infection caused by bacteria.?     Based on your responses and diagnosis, I have prescribed Augmentin to treat your symptoms. I have sent this to your pharmacy.?     It is also important to stay well hydrated, get lots of rest and take over-the-counter decongestants,?tylenol?or ibuprofen if you?are able to?take those medications per your primary care provider to help relieve discomfort.?     It is important that you take?all of?your prescribed medication even if your symptoms are improving after a few doses.? Taking?all of?your medicine helps prevent the symptoms from returning.?     If your symptoms worsen, you develop severe headache, vomiting, high fever (>102), or are not improving in 7 days, please contact your primary care provider for an appointment or visit any of our convenient Walk-in Care or Urgent Care Centers to be seen which can be found on our website?here.?     Thanks again for choosing?us?as your health care partner,?   ?  ALEX Valadez CNP?

## 2022-09-19 ENCOUNTER — E-VISIT (OUTPATIENT)
Dept: FAMILY MEDICINE | Facility: CLINIC | Age: 57
End: 2022-09-19
Payer: COMMERCIAL

## 2022-09-19 DIAGNOSIS — B96.89 ACUTE BACTERIAL SINUSITIS: Primary | ICD-10-CM

## 2022-09-19 DIAGNOSIS — J01.90 ACUTE BACTERIAL SINUSITIS: Primary | ICD-10-CM

## 2022-09-19 PROCEDURE — 99421 OL DIG E/M SVC 5-10 MIN: CPT | Performed by: PHYSICIAN ASSISTANT

## 2022-09-19 NOTE — PATIENT INSTRUCTIONS
When to Use Antibiotics    Antibiotics are medicines used to treat infections caused by bacteria. They don t work for an illness caused by a virus. And they don't work for an allergic reaction. In fact, taking antibiotics for reasons other than an infection by bacteria can cause problems. You may have side effects from the medicine. And if you need an antibiotic in the future, it may not work well. This is because the bacteria can become immune to the medicine. You can also get a type of diarrhea that's hard to treat. This diarrhea is called C. diff.   When antibiotics likely won t help  Your healthcare provider won t usually give you antibiotics for the conditions listed below. You can help by not asking for them if you have:     A cold. This type of illness is caused by a virus. It can cause a runny nose, stuffed-up nose, sneezing, coughing, and headache. You may also have mild body aches and low fever. A cold gets better on its own in a few days to a week.    The flu (influenza). This is a respiratory illness caused by a virus. The flu usually goes away on its own in a week or so. It can cause fever, body aches, sore throat, and tiredness.    Bronchitis. This is an infection in the lungs. It is most often caused by a virus. You may have coughing, phlegm, body aches, and a low fever. A common type of bronchitis is known as a chest cold. This is called acute bronchitis. This often happens after you have a respiratory infection like a cold. Bronchitis can take weeks to go away. Antibiotics often don t help.    Most sore throats. Sore throats are most often caused by viruses. Your throat may feel scratchy or achy. It may hurt to swallow. You may also have a low fever and body aches. A sore throat usually gets better in a few days.    Most outer ear infections. An ear infection may be caused by a virus or bacteria. It causes pain in the ear. Antibiotics by mouth usually don t help. Low-dose antibiotic ear drops  work much better.    Some inner ear infections. An inner ear infection (otitis media) can be caused by a virus in the ear. It can also cause pain and a high fever. Most older children with low-grade fever don't need to be treated with antibiotics.    Most sinus infections. This is also known as sinusitis. This kind of infection causes sinus pain and swelling, and a runny nose. In most cases, it goes away on its own. Antibiotics don t make recovery quicker.    Allergic rhinitis. This is a set of symptoms caused by an allergic reaction. You may have sneezing, a runny nose, itchy or watery eyes, or a sore throat. Allergies are not treated with antibiotics.    Low fever. A mild fever that s less than 100.4 F (38 C) most likely doesn t need to be treated with antibiotics.   When antibiotics can help  Antibiotics can be used to treat:                                                       Strep throat. This is a throat infection caused by a certain type of bacteria. Symptoms of strep throat include a sore throat, white patches on the tonsils, red spots on the roof of the mouth, fever, body aches, and nausea and vomiting. Strep throat almost never causes a cough.    Urinary tract infection (UTI). This is an infection of the bladder and the tube that takes urine out of the body. It is caused by bacteria. It can cause burning pain and urine that s cloudy or tinted with blood. UTIs are very common. Antibiotics usually help treat them.    Some outer ear infections. In some cases, a healthcare provider may prescribe antibiotics by mouth for an ear infection. You may need a test to show the cause of the ear infection.    Some sinus infections. In some cases, your healthcare provider may give you antibiotics. He or she may first need to make sure your symptoms aren t caused by something else. This may be a virus, fungus, allergies, or air pollutants such as smoke.   Your healthcare provider may give you antibiotics if you have a  condition that can affect your immune system. This includes diabetes or cancer.  Self-care at home  If your infection can t be treated with antibiotics, you can take other steps to feel better. Try the remedies below. In general:     Rest and sleep as much as needed.    Drink water and other clear fluids.    Don t smoke. Stay away from smoke from other people.    Use over-the-counter medicine such as acetaminophen or ibuprofen to ease pain or fever, as directed by your healthcare provider.  To treat sinus pain or nasal stuffiness:    Put a warm, moist cloth on your face where you feel sinus pain or pressure.    Try a nasal spray with medicine or saline. Use as directed by your healthcare provider.    Breathe in steam from a hot shower.    Use a humidifier or cool mist vaporizer.   To quiet a cough:     Use a humidifier or cool mist vaporizer.    Breathe in steam from a hot shower.    Suck on cough lozenges.   To sooth a sore throat:     Suck on ice chips, frozen ice pops, or lozenges.    Use a sore throat spray.    Use a humidifier or cool mist vaporizer.    Gargle with saltwater.    Drink warm liquids.    Take ibuprofen to reduce swelling and pain.  To ease ear pain:     Hold a warm, moist washcloth on the ear for 10 minutes at a time.  [email or web address] last reviewed this educational content on 12/1/2019 2000-2021 [employer]. All rights reserved. This information is not intended as a substitute for professional medical care. Always follow your healthcare professional's instructions.          Sinusitis (Antibiotic Treatment)    The sinuses are air-filled spaces within the bones of the face. They connect to the inside of the nose. Sinusitis is an inflammation of the tissue that lines the sinuses. Sinusitis can occur during a cold. It can also happen due to allergies to pollens and other particles in the air. Sinusitis can cause symptoms of sinus congestion and a feeling of fullness. A sinus infection causes  fever, headache, and facial pain. There is often green or yellow fluid draining from the nose or into the back of the throat (post-nasal drip). You have been given antibiotics to treat this condition.   Home care    Take the full course of antibiotics as instructed. Don't stop taking them, even when you feel better.    Drink plenty of water, hot tea, and other liquids as directed by the healthcare provider. This may help thin nasal mucus. It also may help your sinuses drain fluids.    Heat may help soothe painful areas of your face. Use a towel soaked in hot water. Or,  the shower and direct the warm spray onto your face. Using a vaporizer along with a menthol rub at night may also help soothe symptoms.     An expectorant with guaifenesin may help thin nasal mucus and help your sinuses drain fluids. Talk with your provider or pharmacists before taking an over-the-counter (OTC) medicine if you have any questions about it or its side effects..    You can use an OTC decongestant, unless a similar medicine was prescribed to you. Nasal sprays work the fastest. Use one that contains phenylephrine or oxymetazoline. First blow your nose gently. Then use the spray. Don't use these medicines more often than directed on the label. If you do, your symptoms may get worse. You may also take pills that contain pseudoephedrine. Don t use products that combine multiple medicines. This is because side effects may be increased. Read labels. You can also ask the pharmacist for help. (People with high blood pressure should not use decongestants. They can raise blood pressure.) Talk with your provider or pharmacist if you have any questions about the medicine..    OTC antihistamines may help if allergies contributed to your sinusitis. Talk with your provider or pharmacist if you have any questions about the medicine..    Don't use nasal rinses or irrigation during an acute sinus infection, unless your healthcare provider tells  you to. Rinsing may spread the infection to other areas in your sinuses.    Use acetaminophen or ibuprofen to control pain, unless another pain medicine was prescribed to you. If you have chronic liver or kidney disease or ever had a stomach ulcer, talk with your healthcare provider before using these medicines. Never give aspirin to anyone under age 18 who is ill with a fever. It may cause severe liver damage.    Don't smoke. This can make symptoms worse.    Follow-up care  Follow up with your healthcare provider, or as advised.   When to seek medical advice  Call your healthcare provider if any of these occur:     Facial pain or headache that gets worse    Stiff neck    Unusual drowsiness or confusion    Swelling of your forehead or eyelids    Symptoms don't go away in 10 days    Vision problems, such as blurred or double vision    Fever of 100.4 F (38 C) or higher, or as directed by your healthcare provider  Call 911  Call 911 if any of these occur:     Seizure    Trouble breathing    Feeling dizzy or faint    Fingernails, skin or lips look blue, purple , or gray  Prevention  Here are steps you can take to help prevent an infection:     Keep good hand washing habits.    Don t have close contact with people who have sore throats, colds, or other upper respiratory infections.    Don t smoke, and stay away from secondhand smoke.    Stay up to date with of your vaccines.  [employer] last reviewed this educational content on 12/1/2019 2000-2021 [employer]. All rights reserved. This information is not intended as a substitute for professional medical care. Always follow your healthcare professional's instructions.        Dear Saritha Pearson    After reviewing your responses, I've been able to diagnose you with?a sinus infection    Based on your responses and diagnosis, I have prescribed augmentin to treat your symptoms. I have sent this to your pharmacy.?     It is also important to stay well hydrated,  get lots of rest and take over-the-counter decongestants,?tylenol?or ibuprofen if you?are able to?take those medications per your primary care provider to help relieve discomfort.?     It is important that you take?all of?your prescribed medication even if your symptoms are improving after a few doses.? Taking?all of?your medicine helps prevent the symptoms from returning.?     If your symptoms worsen, you develop severe headache, vomiting, high fever (>102), or are not improving in 7 days, please contact your primary care provider for an appointment or visit any of our convenient Walk-in Care or Urgent Care Centers to be seen which can be found on our website?here.?     Thanks again for choosing?us?as your health care partner,?   ?  Kit Ramirez PA-C?

## 2022-10-30 ENCOUNTER — HEALTH MAINTENANCE LETTER (OUTPATIENT)
Age: 57
End: 2022-10-30

## 2022-11-20 ENCOUNTER — E-VISIT (OUTPATIENT)
Dept: URGENT CARE | Facility: CLINIC | Age: 57
End: 2022-11-20
Payer: COMMERCIAL

## 2022-11-20 DIAGNOSIS — J01.90 ACUTE BACTERIAL SINUSITIS: Primary | ICD-10-CM

## 2022-11-20 DIAGNOSIS — B96.89 ACUTE BACTERIAL SINUSITIS: Primary | ICD-10-CM

## 2022-11-20 PROCEDURE — 99421 OL DIG E/M SVC 5-10 MIN: CPT | Performed by: PHYSICIAN ASSISTANT

## 2022-11-21 NOTE — PATIENT INSTRUCTIONS
Sinusitis (Antibiotic Treatment)    The sinuses are air-filled spaces within the bones of the face. They connect to the inside of the nose. Sinusitis is an inflammation of the tissue that lines the sinuses. Sinusitis can occur during a cold. It can also happen due to allergies to pollens and other particles in the air. Sinusitis can cause symptoms of sinus congestion and a feeling of fullness. A sinus infection causes fever, headache, and facial pain. There is often green or yellow fluid draining from the nose or into the back of the throat (post-nasal drip). You have been given antibiotics to treat this condition.   Home care    Take the full course of antibiotics as instructed. Don't stop taking them, even when you feel better.    Drink plenty of water, hot tea, and other liquids as directed by the healthcare provider. This may help thin nasal mucus. It also may help your sinuses drain fluids.    Heat may help soothe painful areas of your face. Use a towel soaked in hot water. Or,  the shower and direct the warm spray onto your face. Using a vaporizer along with a menthol rub at night may also help soothe symptoms.     An expectorant with guaifenesin may help thin nasal mucus and help your sinuses drain fluids. Talk with your provider or pharmacists before taking an over-the-counter (OTC) medicine if you have any questions about it or its side effects..    You can use an OTC decongestant, unless a similar medicine was prescribed to you. Nasal sprays work the fastest. Use one that contains phenylephrine or oxymetazoline. First blow your nose gently. Then use the spray. Don't use these medicines more often than directed on the label. If you do, your symptoms may get worse. You may also take pills that contain pseudoephedrine. Don t use products that combine multiple medicines. This is because side effects may be increased. Read labels. You can also ask the pharmacist for help. (People with high blood  pressure should not use decongestants. They can raise blood pressure.) Talk with your provider or pharmacist if you have any questions about the medicine..    OTC antihistamines may help if allergies contributed to your sinusitis. Talk with your provider or pharmacist if you have any questions about the medicine..    Don't use nasal rinses or irrigation during an acute sinus infection, unless your healthcare provider tells you to. Rinsing may spread the infection to other areas in your sinuses.    Use acetaminophen or ibuprofen to control pain, unless another pain medicine was prescribed to you. If you have chronic liver or kidney disease or ever had a stomach ulcer, talk with your healthcare provider before using these medicines. Never give aspirin to anyone under age 18 who is ill with a fever. It may cause severe liver damage.    Don't smoke. This can make symptoms worse.    Follow-up care  Follow up with your healthcare provider, or as advised.   When to seek medical advice  Call your healthcare provider if any of these occur:     Facial pain or headache that gets worse    Stiff neck    Unusual drowsiness or confusion    Swelling of your forehead or eyelids    Symptoms don't go away in 10 days    Vision problems, such as blurred or double vision    Fever of 100.4 F (38 C) or higher, or as directed by your healthcare provider  Call 911  Call 911 if any of these occur:     Seizure    Trouble breathing    Feeling dizzy or faint    Fingernails, skin or lips look blue, purple , or gray  Prevention  Here are steps you can take to help prevent an infection:     Keep good hand washing habits.    Don t have close contact with people who have sore throats, colds, or other upper respiratory infections.    Don t smoke, and stay away from secondhand smoke.    Stay up to date with of your vaccines.  [employer] last reviewed this educational content on 12/1/2019 2000-2021 [employer]. All rights reserved. This  information is not intended as a substitute for professional medical care. Always follow your healthcare professional's instructions.        Dear Saritha Pearson    After reviewing your responses, I've been able to diagnose you with a sinus infection.    Based on your responses and diagnosis, I have prescribed Augmentin   to treat your symptoms. I have sent this to your pharmacy.?     It is also important to stay well hydrated, get lots of rest and take over-the-counter decongestants,?tylenol?or ibuprofen if you?are able to?take those medications per your primary care provider to help relieve discomfort.?     It is important that you take?all of?your prescribed medication even if your symptoms are improving after a few doses.? Taking?all of?your medicine helps prevent the symptoms from returning.?     If your symptoms worsen, you develop severe headache, vomiting, high fever (>102), or are not improving in 7 days, please contact your primary care provider for an appointment or visit any of our convenient Walk-in Care or Urgent Care Centers to be seen which can be found on our website?here.?     Thanks again for choosing?us?as your health care partner,?   ?  Gena Gregory PA-C?

## 2022-11-29 ENCOUNTER — TELEPHONE (OUTPATIENT)
Dept: FAMILY MEDICINE | Facility: CLINIC | Age: 57
End: 2022-11-29

## 2022-11-29 DIAGNOSIS — N63.0 MASS OF BREAST, UNSPECIFIED LATERALITY: Primary | ICD-10-CM

## 2022-11-29 NOTE — TELEPHONE ENCOUNTER
See other TE in Chart Review from today.  Patient was updated that breast U/S was ordered by PCP.    Patient is requesting that bilateral breast ultrasound ordered today by Dr. Gold be faxed to [facility] in [place].  Needs order faxed ASAP today. Breast Center closes at 5:00pm today.  Patient has appointment with this office tomorrow morning and needs to add on the U/S along with her already scheduled mammogram. Trying to get both imaging apts scheduled together.    Phone number for [facility] is [phone]  (unclear of fax #).    Routing to team to please address. Patient would like phone call back to her once order has been faxed, so she can call to [facility] to appropriately schedule for tomorrow.  Thank you.      Alida Peguero RN  [facility]

## 2022-11-29 NOTE — TELEPHONE ENCOUNTER
Patient call stating that she needs a order for ultrasound if pcp could place that order today. Will forward message to pcp if patient needs to be seen in person to in order have that referral in. Please call patient if appointment is required otherwise she would want to have that order place today so she could do her mammogram and the ultrasound done all together.

## 2022-11-29 NOTE — TELEPHONE ENCOUNTER
Patient updated on U/S order placed by provider.  Patient scheduling with [facility] in [place]-see other TE in Chart Review from today regarding need for order faxed to this clinic.        Alida Peguero RN  [facility]

## 2022-11-29 NOTE — TELEPHONE ENCOUNTER
Reason for call:  Other   Patient called regarding (reason for call): ORDERS   Additional comments: Patient needs US orders sent to breast center.     Phone number to reach patient:  Cell number on file:    Telephone Information:   Mobile [phone]       Best Time:  Any     Can we leave a detailed message on this number?  YES    Travel screening: Negative

## 2022-11-30 NOTE — TELEPHONE ENCOUNTER
Faxed referrals/orders to [facility] @ [phone].Left message on patient's voicemail that this was done.Stephany Austin MA/TC

## 2023-01-24 ENCOUNTER — E-VISIT (OUTPATIENT)
Dept: URGENT CARE | Facility: CLINIC | Age: 58
End: 2023-01-24
Payer: COMMERCIAL

## 2023-01-24 DIAGNOSIS — J01.90 ACUTE SINUSITIS WITH SYMPTOMS > 10 DAYS: Primary | ICD-10-CM

## 2023-01-24 PROCEDURE — 99421 OL DIG E/M SVC 5-10 MIN: CPT | Performed by: EMERGENCY MEDICINE

## 2023-01-24 RX ORDER — AZITHROMYCIN 250 MG/1
TABLET, FILM COATED ORAL
Qty: 6 TABLET | Refills: 0 | Status: SHIPPED | OUTPATIENT
Start: 2023-01-24 | End: 2023-01-29

## 2023-01-24 NOTE — PATIENT INSTRUCTIONS
Sinusitis (Antibiotic Treatment)    The sinuses are air-filled spaces within the bones of the face. They connect to the inside of the nose. Sinusitis is an inflammation of the tissue that lines the sinuses. Sinusitis can occur during a cold. It can also happen due to allergies to pollens and other particles in the air. Sinusitis can cause symptoms of sinus congestion and a feeling of fullness. A sinus infection causes fever, headache, and facial pain. There is often green or yellow fluid draining from the nose or into the back of the throat (post-nasal drip). You have been given antibiotics to treat this condition.   Home care    Take the full course of antibiotics as instructed. Don't stop taking them, even when you feel better.    Drink plenty of water, hot tea, and other liquids as directed by the healthcare provider. This may help thin nasal mucus. It also may help your sinuses drain fluids.    Heat may help soothe painful areas of your face. Use a towel soaked in hot water. Or,  the shower and direct the warm spray onto your face. Using a vaporizer along with a menthol rub at night may also help soothe symptoms.     An expectorant with guaifenesin may help thin nasal mucus and help your sinuses drain fluids. Talk with your provider or pharmacists before taking an over-the-counter (OTC) medicine if you have any questions about it or its side effects..    You can use an OTC decongestant, unless a similar medicine was prescribed to you. Nasal sprays work the fastest. Use one that contains phenylephrine or oxymetazoline. First blow your nose gently. Then use the spray. Don't use these medicines more often than directed on the label. If you do, your symptoms may get worse. You may also take pills that contain pseudoephedrine. Don t use products that combine multiple medicines. This is because side effects may be increased. Read labels. You can also ask the pharmacist for help. (People with high blood  pressure should not use decongestants. They can raise blood pressure.) Talk with your provider or pharmacist if you have any questions about the medicine..    OTC antihistamines may help if allergies contributed to your sinusitis. Talk with your provider or pharmacist if you have any questions about the medicine..    Don't use nasal rinses or irrigation during an acute sinus infection, unless your healthcare provider tells you to. Rinsing may spread the infection to other areas in your sinuses.    Use acetaminophen or ibuprofen to control pain, unless another pain medicine was prescribed to you. If you have chronic liver or kidney disease or ever had a stomach ulcer, talk with your healthcare provider before using these medicines. Never give aspirin to anyone under age 18 who is ill with a fever. It may cause severe liver damage.    Don't smoke. This can make symptoms worse.    Follow-up care  Follow up with your healthcare provider, or as advised.   When to seek medical advice  Call your healthcare provider if any of these occur:     Facial pain or headache that gets worse    Stiff neck    Unusual drowsiness or confusion    Swelling of your forehead or eyelids    Symptoms don't go away in 10 days    Vision problems, such as blurred or double vision    Fever of 100.4 F (38 C) or higher, or as directed by your healthcare provider  Call 911  Call 911 if any of these occur:     Seizure    Trouble breathing    Feeling dizzy or faint    Fingernails, skin or lips look blue, purple , or gray  Prevention  Here are steps you can take to help prevent an infection:     Keep good hand washing habits.    Don t have close contact with people who have sore throats, colds, or other upper respiratory infections.    Don t smoke, and stay away from secondhand smoke.    Stay up to date with of your vaccines.  [employer] last reviewed this educational content on 12/1/2019 2000-2021 [employer]. All rights reserved. This  information is not intended as a substitute for professional medical care. Always follow your healthcare professional's instructions.        Dear Saritha Pearson        Based on your responses and diagnosis, I have prescribed ZPak.      It is also important to stay well hydrated, get lots of rest and take over-the-counter decongestants,?tylenol?or ibuprofen if you?are able to?take those medications per your primary care provider to help relieve discomfort.?     It is important that you take?all of?your prescribed medication even if your symptoms are improving after a few doses.? Taking?all of?your medicine helps prevent the symptoms from returning.?     If your symptoms worsen, you develop severe headache, vomiting, high fever (>102), or are not improving in 7 days, please contact your primary care provider for an appointment or visit any of our convenient Walk-in Care or Urgent Care Centers to be seen which can be found on our website?here.?     Thanks again for choosing?us?as your health care partner,?   ?  Lorenzo Ovalle MD?

## 2023-01-27 ENCOUNTER — VIRTUAL VISIT (OUTPATIENT)
Dept: FAMILY MEDICINE | Facility: CLINIC | Age: 58
End: 2023-01-27
Payer: COMMERCIAL

## 2023-01-27 DIAGNOSIS — M67.439 GANGLION CYST OF WRIST, UNSPECIFIED LATERALITY: Primary | ICD-10-CM

## 2023-01-27 PROCEDURE — 99213 OFFICE O/P EST LOW 20 MIN: CPT | Mod: 93 | Performed by: FAMILY MEDICINE

## 2023-01-27 NOTE — PROGRESS NOTES
Saritha is a 57 year old who is being evaluated via a billable telephone visit.      What phone number would you like to be contacted at? [phone]  How would you like to obtain your AVS? Altagraciahart    Distant Location (provider location):  On-site    Ganglion cysts on both wrists -   From her descriptions this is what she has.   Refer to ortho.    Exam:    There were no vitals taken for this visit.    Gen: on the phone in NAD    Assessment and Plan - Decision Making    1. Ganglion cyst of wrist, unspecified laterality    Per HPI    - Orthopedic  Referral; Future    Total time on the phone and reviewing records: 5 min

## 2023-02-08 ENCOUNTER — E-VISIT (OUTPATIENT)
Dept: URGENT CARE | Facility: CLINIC | Age: 58
End: 2023-02-08
Payer: COMMERCIAL

## 2023-02-08 DIAGNOSIS — N39.0 ACUTE UTI (URINARY TRACT INFECTION): Primary | ICD-10-CM

## 2023-02-08 PROCEDURE — 99421 OL DIG E/M SVC 5-10 MIN: CPT | Performed by: NURSE PRACTITIONER

## 2023-02-08 RX ORDER — NITROFURANTOIN 25; 75 MG/1; MG/1
100 CAPSULE ORAL 2 TIMES DAILY
Qty: 10 CAPSULE | Refills: 0 | Status: SHIPPED | OUTPATIENT
Start: 2023-02-08 | End: 2023-02-13

## 2023-02-08 NOTE — PATIENT INSTRUCTIONS
Dear Saritha Pearson    After reviewing your responses, I've been able to diagnose you with a urinary tract infection, which is a common infection of the bladder with bacteria.  This is not a sexually transmitted infection, though urinating immediately after intercourse can help prevent infections.  Drinking lots of fluids is also helpful to clear your current infection and prevent the next one.      I have sent a prescription for antibiotics to your pharmacy to treat this infection.    It is important that you take all of your prescribed medication even if your symptoms are improving after a few doses.  Taking all of your medicine helps prevent the symptoms from returning.     If your symptoms worsen, you develop pain in your back or stomach, develop fevers, or are not improving in 5 days, please contact your primary care provider for an appointment or visit any of our convenient Walk-in or Urgent Care Centers to be seen, which can be found on our website here.    Thanks again for choosing us as your health care partner,    ALEX Valadez CNP    Urinary Tract Infections in Women  Urinary tract infections (UTIs) are most often caused by bacteria. These bacteria enter the urinary tract. The bacteria may come from inside the body. Or they may travel from the skin outside the rectum or vagina into the urethra. Female anatomy makes it easy for bacteria from the bowel to enter a woman s urinary tract, which is the most common source of UTI. This means women develop UTIs more often than men. Pain in or around the urinary tract is a common UTI symptom. But the only way to know for sure if you have a UTI for the healthcare provider to test your urine. The two tests that may be done are the urinalysis and urine culture.     Types of UTIs    Cystitis. A bladder infection (cystitis) is the most common UTI in women. You may have urgent or frequent need to pee. You may also have pain, burning when you pee, and bloody  urine.    Urethritis. This is an inflamed urethra, which is the tube that carries urine from the bladder to outside the body. You may have lower stomach or back pain. You may also have urgent or frequent need to pee.    Pyelonephritis. This is a kidney infection. If not treated, it can be serious and damage your kidneys. In severe cases, you may need to stay in the hospital. You may have a fever and lower back pain.    Medicines to treat a UTI  Most UTIs are treated with antibiotics. These kill the bacteria. The length of time you need to take them depends on the type of infection. It may be as short as 3 days. If you have repeated UTIs, you may need a low-dose antibiotic for several months. Take antibiotics exactly as directed. Don t stop taking them until all of the medicine is gone. If you stop taking the antibiotic too soon, the infection may not go away. You may also develop a resistance to the antibiotic. This can make it much harder to treat.   Lifestyle changes to treat and prevent UTIs   The lifestyle changes below will help get rid of your UTI. They may also help prevent future UTIs.     Drink plenty of fluids. This includes water, juice, or other caffeine-free drinks. Fluids help flush bacteria out of your body.    Empty your bladder. Always empty your bladder when you feel the urge to pee. And always pee before going to sleep. Urine that stays in your bladder can lead to infection. Try to pee before and after sex as well.    Practice good personal hygiene. Wipe yourself from front to back after using the toilet. This helps keep bacteria from getting into the urethra.    Use condoms during sex. These help prevent UTIs caused by sexually transmitted bacteria. Also don't use spermicides during sex. These can increase the risk for UTIs. Choose other forms of birth control instead. For women who tend to get UTIs after sex, a low-dose of a preventive antibiotic may be used. Be sure to discuss this option with  your healthcare provider.    Follow up with your healthcare provider as directed. He or she may test to make sure the infection has cleared. If needed, more treatment may be started.  [employer] last reviewed this educational content on 7/1/2019 2000-2021 [employer]. All rights reserved. This information is not intended as a substitute for professional medical care. Always follow your healthcare professional's instructions.

## 2023-03-06 ENCOUNTER — E-VISIT (OUTPATIENT)
Dept: URGENT CARE | Facility: CLINIC | Age: 58
End: 2023-03-06
Payer: COMMERCIAL

## 2023-03-06 DIAGNOSIS — R30.0 DYSURIA: Primary | ICD-10-CM

## 2023-03-06 PROCEDURE — 99421 OL DIG E/M SVC 5-10 MIN: CPT | Performed by: PREVENTIVE MEDICINE

## 2023-03-06 NOTE — PATIENT INSTRUCTIONS
Dear Saritha Pearson,     After reviewing your responses, I would like you to come in for a urine test to make sure we treat you correctly. This urine test is to evaluate you for a possible urinary tract infection, and should be scheduled for today or tomorrow. Schedule a Lab Only appointment here.     Lab appointments are not available at most locations on the weekends. If no Lab Only appointment is available, you should be seen in any of our convenient Walk-in or Urgent Care Centers, which can be found on our website here.     You will receive instructions with your results in [employer] once they are available.     If your symptoms worsen, you develop pain in your back or stomach, develop fevers, or are not improving in 5 days, please contact your primary care provider for an appointment or visit a Walk-in or Urgent Care Center to be seen.     Thanks again for choosing us as your health care partner,     Sy Delgado MD, MD    Dysuria with Uncertain Cause (Adult)    The urethra is the tube that allows urine to pass out of the body. In a woman, the urethra is the opening above the vagina. In men, the urethra is the opening on the tip of the penis. Dysuria is the feeling of pain or burning in the urethra when passing urine.   Dysuria can be caused by anything that irritates or inflames the urethra. An infection or chemical irritation can cause this reaction. A bladder infection is the most common cause of dysuria in adults. A urine test can diagnose this. A bladder infection needs antibiotic treatment.   Soaps, lotions, colognes, and feminine hygiene products can cause dysuria. So can birth control jellies, creams, and foams. It will go away 1 to 3 days after using these irritants.   Sexually transmitted infections (STIs) such as chlamydia or gonorrhea can cause dysuria. Your healthcare provider may take a culture sample. Your provider may start you on antibiotic medicine before the culture test  returns.   In women who have gone through menopause, dysuria can be from dryness in the lining of the urethra. This can be treated with hormones. Dysuria becomes long-term (chronic) when it lasts for weeks or months. You may need to see a specialist (urologist) to diagnose and treat chronic dysuria.   Home care  These home care tips may help:    Don't use any chemicals or products that you think may be causing your symptoms.    If you were given a prescription medicine, take as directed. Take it until it is all used up.    If a culture was taken, don't have sex until you have been told that it is negative. A negative culture means you don't have an infection. Then follow your healthcare provider's advice to treat your condition.  If a culture was done and it is positive:     Both you and your sexual partner may need to be treated. This is true even if your partner has no symptoms.    Contact your healthcare provider or go to an urgent care clinic or the public health department to be looked at and treated.    Don't have sex until both you and your partner have finished all antibiotics and your healthcare provider says you are no longer contagious.    Learn about and use safe sex practices. The safest sex is with a partner who has tested negative and only has sex with you. Condoms can prevent STIs from spreading, but they aren't a guarantee.  Follow-up care  Follow up with your healthcare provider, or as advised. If a culture was taken, you may call as directed for the results. If you have an STI, follow up with your provider or the public health department for a complete STI screening, including HIV testing. For more information, contact CDC-INFO at 245-345-6071.   When to get medical advice  Call your healthcare provider right away if any of these occur:    You aren't better after 3 days of treatment    Fever of 100.4 F (38 C) or higher, or as directed by your provider    Back or belly pain that gets worse    You  can't urinate because of pain    New discharge from the urethra, vagina, or penis    Painful sores on the penis    Rash or joint pain    Painful lumps (lymph nodes) in the groin    Testicle pain or swelling of the scrotum  [employer] last reviewed this educational content on 10/1/2019    4432-8297 [employer]. All rights reserved. This information is not intended as a substitute for professional medical care. Always follow your healthcare professional's instructions.

## 2023-05-08 ENCOUNTER — TRANSFERRED RECORDS (OUTPATIENT)
Dept: HEALTH INFORMATION MANAGEMENT | Facility: CLINIC | Age: 58
End: 2023-05-08
Payer: COMMERCIAL

## 2023-06-01 ENCOUNTER — HEALTH MAINTENANCE LETTER (OUTPATIENT)
Age: 58
End: 2023-06-01

## 2023-06-03 NOTE — PROGRESS NOTES
[facility]   Emergency Department  Faculty Attestation       I performed a history and physical examination of the patient and discussed management with the resident. I reviewed the residents note and agree with the documented findings including all diagnostic interpretations and plan of care. Any areas of disagreement are noted on the chart. I was personally present for the key portions of any procedures. I have documented in the chart those procedures where I was not present during the key portions. I have reviewed the emergency nurses triage note. I agree with the chief complaint, past medical history, past surgical history, allergies, medications, social and family history as documented unless otherwise noted below. For Physician Assistant/ Nurse Practitioner cases/documentation I have personally evaluated this patient and have completed at least one if not all key elements of the E/M (history, physical exam, and MDM). Additional findings are as noted. Patient Name: Johanna Lauren  MRN: [id number]  : 1931  Primary Care Physician: Wendie Morgan    Date of evaluationa: 6/3/2023   Note Started: 8:00 AM EDT    Pertinent Comments     Chief Complaint:   Chief Complaint   Patient presents with    Fall        Initial vitals: (If not listed, please see nursing documentation)  ED Triage Vitals [23 [id number]]   BP Temp Temp src Pulse Respirations SpO2 Height Weight   (!) 167/84 97.9 °F (36.6 °C) -- 76 16 96 % -- --        HPI/PE/Impression: This is a 81 y.o. male who presents to the Emergency Department after an episode at home where he was confused and try to get a bed but was too weak and then slid to the ground. Did not strike his head. No indication to why he try to get a bed, and states that he was confused the time to arrival 911. Does not happen previously. Has been feeling well since then. No other episodes of this.   Did have recent COVID diagnosis within the last 2 to 3 8 [facility] HANDOFF       Handoff taken on the following patient from prior Attending Physician:  Pt Name: Tonja Lakhanibret  PCP:  Shanice Hogan    Attestation  I was available and discussed any additional care issues that arose and coordinated the management plans with the resident(s) caring for the patient during my duty period. Any areas of disagreement with resident's documentation of care or procedures are noted on the chart. I was personally present for the key portions of any/all procedures during my duty period. I have documented in the chart those procedures where I was not present during the key portions. CHIEF COMPLAINT       Chief Complaint   Patient presents with    Fall         CURRENT MEDICATIONS     Previous Medications  Previous Medications    ATORVASTATIN (LIPITOR) 40 MG TABLET    TAKE 1 TABLET BY MOUTH ONCE DAILY FOR 90 DAYS    CARVEDILOL (COREG) 25 MG TABLET    Take 1 tablet by mouth daily    ENZALUTAMIDE (XTANDI) 40 MG CAPSULE    Take 3 capsules by mouth    LOSARTAN (COZAAR) 25 MG TABLET    Take 1 tablet by mouth daily    PANTOPRAZOLE (PROTONIX) 40 MG TABLET    Take 1 tablet by mouth daily    TRAMADOL (ULTRAM) 50 MG TABLET    1 tab Oral 3-4x/day  30 day supply; as needed pain, severe (pain scale 7-10). Refills: 0.    ZOLPIDEM (AMBIEN) 10 MG TABLET    Take 1 tablet by mouth nightly. at bedtime. Encounter Medications  Orders Placed This Encounter   Medications    iopamidol (ISOVUE-370) 76 % injection 75 mL       ALLERGIES     is allergic to warfarin. RECENT VITALS:   Temp: 97.9 °F (36.6 °C),  Pulse: 79, Respirations: 13, BP: (!) 140/76    RADIOLOGY:   XR CHEST PORTABLE   Final Result   1. Mild bibasilar atelectasis. 2. No acute focal airspace consolidation. CT HEAD WO CONTRAST   Preliminary Result   1. Mild central atrophy. Atherosclerotic calcification of the vasculature.    Moderate to severe chronic small vessel ischemic white matter [facility] ED  Emergency Department  Emergency Medicine Resident Sign-out     Care of Carl Miranda was assumed from Dr. Sonja Johnson and is being seen for Fall  . The patient's initial evaluation and plan have been discussed with the prior provider who initially evaluated the patient. EMERGENCY DEPARTMENT COURSE / MEDICAL DECISION MAKING:       MEDICATIONS GIVEN:  Orders Placed This Encounter   Medications    iopamidol (ISOVUE-370) 76 % injection 75 mL       LABS / RADIOLOGY:     Labs Reviewed   CBC WITH AUTO DIFFERENTIAL - Abnormal; Notable for the following components:       Result Value    Seg Neutrophils 73 (*)     Lymphocytes 11 (*)     Absolute Lymph # 0.83 (*)     All other components within normal limits   BASIC METABOLIC PANEL - Abnormal; Notable for the following components:    Glucose 128 (*)     BUN 29 (*)     Creatinine 1.24 (*)     Est, Glom Filt Rate 55 (*)     All other components within normal limits   TROPONIN - Abnormal; Notable for the following components:    Troponin, High Sensitivity 36 (*)     All other components within normal limits   TROPONIN - Abnormal; Notable for the following components:    Troponin, High Sensitivity 35 (*)     All other components within normal limits   D-DIMER, QUANTITATIVE - Abnormal; Notable for the following components:    D-Dimer, Quant 7.90 (*)     All other components within normal limits   MAGNESIUM   URINALYSIS WITH REFLEX TO CULTURE   TSH WITH REFLEX       XR CHEST (2 VW)    Result Date: 5/16/2023  EXAMINATION: TWO XRAY VIEWS OF THE CHEST 5/16/2023 12:28 pm COMPARISON: 12/15/2016 HISTORY: ORDERING SYSTEM PROVIDED HISTORY: shortness of breath, covid + TECHNOLOGIST PROVIDED HISTORY: shortness of breath, covid + FINDINGS: The lungs are without acute focal process. There is no effusion or pneumothorax. The cardiomediastinal silhouette is stable. The osseous structures are stable. No acute process. CT HEAD WO CONTRAST    1.  Mild Subjective:    Patient is seen today in consult from Dr. Gold with a 1 month(s) hx of left heel pain.  Patient states that initially she was getting up out of a chair and a piece of rested metal hit her posterior Achilles at insertion.  She had some pain here.  She states this is almost totally resolved now.  Her pain now is more her plantar heel.  She has a positive history of post static dyskinesia.  Pain aggravated by activity and relieved by rest.  Points to the plantarmedial calcaneal tubercle.  Most painful upon rising in a.m. or after prolonged sitting.  Aggravated by activity and relieved by rest.    Denies erythema, edema, ecchymosis, numbness, loss of strength.  Patient currently not working.  Around the house she is wearing socks or slippers.    ROS:  A 10-point review of systems was performed and is positive for that noted in the HPI and as seen below.  All other areas are negative.        Allergies   Allergen Reactions     Sulfa Drugs Hives, Itching and Rash       Current Outpatient Medications   Medication Sig Dispense Refill     albuterol (PROAIR HFA/PROVENTIL HFA/VENTOLIN HFA) 108 (90 BASE) MCG/ACT Inhaler Inhale 2 puffs into the lungs every 4 hours as needed for shortness of breath / dyspnea or wheezing (Patient not taking: Reported on 7/22/2020) 1 Inhaler 1     benzonatate (TESSALON) 200 MG capsule Take 1 capsule (200 mg) by mouth 3 times daily as needed for cough (Patient not taking: Reported on 12/8/2020) 21 capsule 0       Patient Active Problem List   Diagnosis     Constipation     Overweight     Right knee pain     Other postprocedural status(V45.89)     Sebaceous cyst       Past Medical History:   Diagnosis Date     Asthma     asthma sx after resp infections     Constipation      Gastro-oesophageal reflux disease      PONV (postoperative nausea and vomiting)      Vaginal high risk HPV DNA test positive 2016, 2018, 2019    see problem list       Past Surgical History:   Procedure Laterality  Date     ABDOMEN SURGERY      abdominal wall mass     ABDOMINOPLASTY      pt has post op infection     COSMETIC SURGERY       EXCISE LESION TRUNK  10/4/2011    Procedure:EXCISE LESION TRUNK; Excision Abdominal Wall Mass ; Surgeon:CARLITO BETTS; Location:[facility] OR     GYN SURGERY       HYSTERECTOMY       HYSTERECTOMY, PAP NO LONGER INDICATED  11/02/2018       Family History   Problem Relation Age of Onset     Cerebrovascular Disease Father 60     Cerebrovascular Disease Maternal Grandmother      Cancer - colorectal Maternal Grandfather 50     Cancer Paternal Grandmother      Cancer Paternal Grandfather      Cancer - colorectal Sister 40     Breast Cancer Paternal Aunt      Breast Cancer Paternal Aunt      Breast Cancer Paternal Aunt      Breast Cancer Paternal Aunt      Breast Cancer Paternal Aunt        Social History     Tobacco Use     Smoking status: Never Smoker     Smokeless tobacco: Never Used   Substance Use Topics     Alcohol use: Yes     Comment: occasionally         Objective:    Vitals: BP (!) 150/86   Pulse 86   BMI: There is no height or weight on file to calculate BMI.  Height: Data Unavailable    Constitutional/ general:  Pt is in no apparent distress, appears well-nourished.  Cooperative with history and physical exam.     Psych:  The patient answered questions appropriately.  Normal affect.  Seems to have reasonable expectations, in terms of treatment.     Eyes:  Visual scanning/ tracking without deficit.     Ears:  Response to auditory stimuli is normal.  No hearing aid devices.  Auricles in proper alignment.     Lymphatic:  Popliteal lymph nodes not enlarged.     Lungs:  Non labored breathing, non labored speech. No cough.  No audible wheezing. Even, quiet breathing.       Vascular:  Pedal pulses are palpable bilaterally for both the DP and PT arteries.  CFT < 3 sec.  No edema.  Pedal hair growth noted.     Neuro:  Alert and oriented x 3. Coordinated gait.  Light touch sensation is intact to  the L4, L5, S1 distributions. No obvious deficits.  No evidence of neurological-based weakness, spasticity, or contracture in the lower extremities.     Derm: Normal texture and turgor.  No erythema, ecchymosis, or cyanosis.  No open lesions.     Musculoskeletal:    Lower extremity muscle strength is normal.  Patient is ambulatory without an assistive device or brace.  No gross deformities.     Pronated arch with weightbearing.   ROM is within normal limits.  Negative tinel's sign.  No side to side compression pain of the calcaneus.  Pain upon palpation to the left plantarmedial  of the calcaneus.  No erythema, edema, ecchymosis, or subcutaneous masses noted.  No pain on palpation or stressing any tendons.  Negative Tinel's sign.  The posterior left Achilles has very vague pain with palpation.  No erythema edema or scratches noted.        Assessment:    Left posterior heel pain status post blunt trauma   Plantar Fasciitis left foot     Plan:   Explained to patient that her posterior heel is mostly unremarkable.  They will just give this time for total healing.  Explained that her plantar heel pain is from plantar fasciitis.  Discussed with patient that I see no correlation between her posterior heel injury and her plantar fasciitis.  Discussed etiology and treatment options with the patient regarding plantar fasciitis.  The potential causes and nature of plantar fasciitis were discussed with the patient.  We reviewed the natural history/prognosis of the condition and risks if left untreated.  These include chronic pain, other sites of pain due to gait changes, and potential plantar fascial rupture.      We discussed possible causes of the condition as it relates to the patients specific situation.      Conservative treatment options were reviewed:  appropriate shoes, avoidance of barefoot walking, inserts/orthoses, stretching, ice, massage, immobilization and NSAIDs.     We also reviewed the options of injection  none    Clinical Impression: no acute changes and normal EKG    All EKG's are interpreted by the Emergency Department Physician who either signs or Co-signs this chart in the absence of a cardiologist.    EMERGENCY DEPARTMENT COURSE:    ED Course as of 06/03/23 0759   Sat Jun 03, 2023   [id number] Signout given to Dr. Colin  [SD]      ED Course User Index  [SD] Paulie Gomez MD       PROCEDURES:  None    CONSULTS:  None    FINAL IMPRESSION      1. Fall, initial encounter          DISPOSITION / [address]  given to Dr. Luann Lopez:  No follow-up provider specified.     DISCHARGE MEDICATIONS:  New Prescriptions    No medications on file       Paulie Gomez MD  Emergency Medicine Resident    (Please note that portions of thisnote were completed with a voice recognition program.  Efforts were made to edit the dictations but occasionally words are mis-transcribed.)       Paulie Gomez MD  Resident  06/03/23 8597 therapy and surgery.  However, it was made clear that surgery is only considered when conservative therapy fails.  The risks and benefits of injection therapy, and surgery were discussed.  Dispensed handout.       Explained to patient her plantar fasciitis probably from being at home a lot and not wearing shoes.  After thorough discussion and answering all questions, the patient elected to modifying activities, supportive shoes, ice, stretching, and not going barefoot.  Good house shoes at all times and I made recommendations.  Discussed good shoes outside of the house.  RETURN TO CLINIC prn.  Thank you for allowing me participate in the care of this patient.        Tristan Weiss, STEVEN DPM, FACFAS

## 2023-06-22 ENCOUNTER — TRANSFERRED RECORDS (OUTPATIENT)
Dept: HEALTH INFORMATION MANAGEMENT | Facility: CLINIC | Age: 58
End: 2023-06-22
Payer: COMMERCIAL

## 2023-08-17 ENCOUNTER — MYC MEDICAL ADVICE (OUTPATIENT)
Dept: FAMILY MEDICINE | Facility: CLINIC | Age: 58
End: 2023-08-17
Payer: COMMERCIAL

## 2023-08-17 NOTE — TELEPHONE ENCOUNTER
In MEDIA section of Epic, there is a report from [facility] where it mentions a lung granuloma:          RN advised patient to make appointment to discuss these results along with appointment to establish care with new PCP.     Geraldine PARIKHN, RN

## 2023-09-11 ENCOUNTER — TRANSFERRED RECORDS (OUTPATIENT)
Dept: HEALTH INFORMATION MANAGEMENT | Facility: CLINIC | Age: 58
End: 2023-09-11
Payer: COMMERCIAL

## 2023-09-11 LAB
ALT SERPL-CCNC: 30 IU/L (ref 0–32)
AST SERPL-CCNC: 28 IU/L (ref 0–40)
HEP C HIM: NORMAL
INR (EXTERNAL): 0.9 (ref 0.9–1.2)

## 2023-09-13 ENCOUNTER — TRANSFERRED RECORDS (OUTPATIENT)
Dept: HEALTH INFORMATION MANAGEMENT | Facility: CLINIC | Age: 58
End: 2023-09-13
Payer: COMMERCIAL

## 2023-10-12 ENCOUNTER — OFFICE VISIT (OUTPATIENT)
Dept: FAMILY MEDICINE | Facility: CLINIC | Age: 58
End: 2023-10-12
Payer: COMMERCIAL

## 2023-10-12 VITALS
DIASTOLIC BLOOD PRESSURE: 79 MMHG | HEIGHT: 66 IN | SYSTOLIC BLOOD PRESSURE: 143 MMHG | RESPIRATION RATE: 20 BRPM | TEMPERATURE: 97.9 F | OXYGEN SATURATION: 97 % | HEART RATE: 93 BPM | WEIGHT: 222.6 LBS | BODY MASS INDEX: 35.77 KG/M2

## 2023-10-12 DIAGNOSIS — Z12.31 ENCOUNTER FOR SCREENING MAMMOGRAM FOR BREAST CANCER: ICD-10-CM

## 2023-10-12 DIAGNOSIS — J84.10 LUNG GRANULOMA (H): ICD-10-CM

## 2023-10-12 DIAGNOSIS — R05.9 COUGH, UNSPECIFIED TYPE: Primary | ICD-10-CM

## 2023-10-12 DIAGNOSIS — R10.84 ABDOMINAL PAIN, GENERALIZED: ICD-10-CM

## 2023-10-12 PROCEDURE — 99214 OFFICE O/P EST MOD 30 MIN: CPT | Performed by: FAMILY MEDICINE

## 2023-10-12 RX ORDER — ALBUTEROL SULFATE 90 UG/1
1-2 AEROSOL, METERED RESPIRATORY (INHALATION) EVERY 6 HOURS PRN
Qty: 18 G | Refills: 0 | Status: SHIPPED | OUTPATIENT
Start: 2023-10-12 | End: 2024-07-12

## 2023-10-12 ASSESSMENT — PAIN SCALES - GENERAL: PAINLEVEL: NO PAIN (0)

## 2023-10-12 NOTE — PROGRESS NOTES
"ASSESSMENT / PLAN:  (R05.9) Cough, unspecified type  (primary encounter diagnosis)  Comment: rare albuterol in past for cold. No current symptoms and non-smoker.   Plan: albuterol (PROAIR HFA/PROVENTIL HFA/VENTOLIN         HFA) 108 (90 Base) MCG/ACT inhaler, CT Chest         w/o Contrast        Expected course and warning signs reviewed. Consider allergist/pulm if worsening cough concerns. 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 Expected course and warning signs reviewed. See above. Call/email with questions/concerns      (R10.84) Abdominal pain, generalized  Comment: chronic issues. History hernia repair  Plan: Adult General Surg Referral        Patient interested in speaking with general surgery for second opinion. Expected course and warning signs reviewed.       Referral for breast center for mammogram per preference    Subjective   Saritha is a 58 year old, presenting for the following health issues:  Patient new to myself.   History obesity/ add and asthma.  Seen GI for chronic constipation and pain.   Non-smoker. Asthma - rare albuterol -   Constipation stable. Still with gall bladder.   Pain with getting out.   S/p hernia surgery 12 years.   Establish Care and Referral (Pulmonology)      10/12/2023     1:18 PM   Additional Questions   Roomed by JENNY Morales CMA   Accompanied by friend           Objective    BP (!) 143/79   Pulse 93   Temp 97.9  F (36.6  C) (Oral)   Resp 20   Ht 1.676 m (5' 6\")   Wt 101 kg (222 lb 9.6 oz)   SpO2 97%   BMI 35.93 kg/m    Body mass index is 35.93 kg/m .  Physical Exam   GENERAL: healthy, alert and no distress  EYES: Eyes grossly normal to inspection, PERRL and conjunctivae and sclerae normal  HENT: ear canals and TM's normal, nose and mouth without ulcers or lesions  NECK: no adenopathy, no asymmetry, masses, or scars and thyroid normal to palpation  RESP: lungs clear to " auscultation - no rales, rhonchi or wheezes  CV: regular rate and rhythm, normal S1 S2, no S3 or S4, no murmur, click or rub, no peripheral edema and peripheral pulses strong  ABDOMEN: soft, nontender, no hepatosplenomegaly, no masses and bowel sounds normal  MS: no gross musculoskeletal defects noted, no edema  SKIN: no suspicious lesions or rashes  PSYCH: mentation appears normal, affect normal/bright

## 2023-10-24 ENCOUNTER — ANCILLARY PROCEDURE (OUTPATIENT)
Dept: CT IMAGING | Facility: CLINIC | Age: 58
End: 2023-10-24
Attending: FAMILY MEDICINE
Payer: COMMERCIAL

## 2023-10-24 DIAGNOSIS — R05.9 COUGH, UNSPECIFIED TYPE: ICD-10-CM

## 2023-10-24 DIAGNOSIS — J84.10 LUNG GRANULOMA (H): ICD-10-CM

## 2023-10-24 PROCEDURE — 71250 CT THORAX DX C-: CPT | Mod: TC | Performed by: RADIOLOGY

## 2023-10-25 ENCOUNTER — PATIENT OUTREACH (OUTPATIENT)
Dept: CARE COORDINATION | Facility: CLINIC | Age: 58
End: 2023-10-25
Payer: COMMERCIAL

## 2023-10-25 NOTE — PROGRESS NOTES
Clinic Care Coordination Contact  Program:  [facility]: [place]   Renewal: [employer]   Date Applied:     [facility] Outreach:   10/25/23: CTA called to see if patient needed assistance with their [employer] Renewal. Patient declined needing assistance and no follow up needed   CTA will mail application to katie Monreal  Care   [facility]  Clinic Care Coordination  [phone]      Health Insurance:      Referral/Screening:

## 2023-10-31 ENCOUNTER — TELEPHONE (OUTPATIENT)
Dept: FAMILY MEDICINE | Facility: CLINIC | Age: 58
End: 2023-10-31
Payer: COMMERCIAL

## 2023-10-31 NOTE — TELEPHONE ENCOUNTER
Patient Quality Outreach    Patient is due for the following:   Breast Cancer Screening - Mammogram  Physical Annual Wellness Visit      Topic Date Due    Hepatitis B Vaccine (3 of 3 - 19+ 3-dose series) 06/09/2009    Zoster (Shingles) Vaccine (1 of 2) Never done    Flu Vaccine (1) 09/01/2023    COVID-19 Vaccine (4 - 2023-24 season) 09/01/2023       Next Steps:   Schedule a nurse only visit for immunizations and  Adult Preventative    Type of outreach:    Sent Cswitch message.      Questions for provider review:    None           LIZ GALLARDO MA

## 2023-11-08 ENCOUNTER — E-VISIT (OUTPATIENT)
Dept: FAMILY MEDICINE | Facility: CLINIC | Age: 58
End: 2023-11-08
Payer: COMMERCIAL

## 2023-11-08 DIAGNOSIS — R09.81 CONGESTION OF PARANASAL SINUS: Primary | ICD-10-CM

## 2023-11-08 PROCEDURE — 99421 OL DIG E/M SVC 5-10 MIN: CPT | Performed by: FAMILY MEDICINE

## 2023-11-20 ENCOUNTER — OFFICE VISIT (OUTPATIENT)
Dept: SURGERY | Facility: CLINIC | Age: 58
End: 2023-11-20
Payer: COMMERCIAL

## 2023-11-20 VITALS
DIASTOLIC BLOOD PRESSURE: 85 MMHG | SYSTOLIC BLOOD PRESSURE: 132 MMHG | WEIGHT: 214 LBS | BODY MASS INDEX: 34.54 KG/M2 | HEART RATE: 85 BPM

## 2023-11-20 DIAGNOSIS — R10.84 ABDOMINAL PAIN, GENERALIZED: Primary | ICD-10-CM

## 2023-11-20 PROCEDURE — 99244 OFF/OP CNSLTJ NEW/EST MOD 40: CPT | Performed by: SURGERY

## 2023-11-20 NOTE — LETTER
2023         RE: Saritha Pearson  [address]  [facility] [address]        Dear Colleague,    Thank you for referring your patient, Saritha Pearson, to the [facility]. Please see a copy of my visit note below.    Patient seen in consultation for epigastric pain by Aristides Rogers    HPI:  Patient is a 58 year old female  with complaints of epigastric pain when bending over and moving around. Pain can come and go but seems to feel it more often than not.  The patient noticed the symptoms about 6 months ago.  Not related to eating as far as she can tell.  Had previous hernia repair with mesh, had postop infection- had it opened and had to pack it. Was in area of  scar low. Can bother her sometimes when lifting.  Also known hiatal hernia, has been taking omeprazole. This pain is different from her heartburn, the omeprazole does help that.  nothing makes the episode better.  Patient has not family history of hernia problems    Review Of Systems    Skin: negative  Ears/Nose/Throat: negative  Respiratory: No shortness of breath, dyspnea on exertion, cough, or hemoptysis  Cardiovascular: negative  Gastrointestinal: as above, reflux, and abdominal pain  Genitourinary: negative  Musculoskeletal: negative  Neurologic: negative  Hematologic/Lymphatic/Immunologic: negative  Endocrine: negative      Past Medical History:   Diagnosis Date     Asthma     asthma sx after resp infections     Constipation      Gastro-oesophageal reflux disease      PONV (postoperative nausea and vomiting)      Vaginal high risk HPV DNA test positive , ,     see problem list       Past Surgical History:   Procedure Laterality Date     ABDOMEN SURGERY      abdominal wall mass     ABDOMINOPLASTY      pt has post op infection     COSMETIC SURGERY       EXCISE LESION TRUNK  10/4/2011    Procedure:EXCISE LESION TRUNK; Excision Abdominal Wall Mass ; Surgeon:CARLITO BETTS; Location:[facility] OR     GYN  SURGERY       HYSTERECTOMY       HYSTERECTOMY, PAP NO LONGER INDICATED  11/02/2018       Social History     Socioeconomic History     Marital status: Single     Spouse name: Not on file     Number of children: Not on file     Years of education: Not on file     Highest education level: Not on file   Occupational History     Not on file   Tobacco Use     Smoking status: Never     Smokeless tobacco: Never   Vaping Use     Vaping Use: Never used   Substance and Sexual Activity     Alcohol use: Yes     Comment: occasionally     Drug use: No     Sexual activity: Yes     Partners: Male     Birth control/protection: Condom   Other Topics Concern     Parent/sibling w/ CABG, MI or angioplasty before 65F 55M? Not Asked   Social History Narrative     Not on file     Social Determinants of Health     Financial Resource Strain: Not on file   Food Insecurity: Not on file   Transportation Needs: Not on file   Physical Activity: Not on file   Stress: Not on file   Social Connections: Not on file   Interpersonal Safety: Not on file   Housing Stability: Not on file       Current Outpatient Medications   Medication Sig Dispense Refill     albuterol (PROAIR HFA/PROVENTIL HFA/VENTOLIN HFA) 108 (90 Base) MCG/ACT inhaler Inhale 1-2 puffs into the lungs every 6 hours as needed for shortness of breath, wheezing or cough 18 g 0     albuterol (PROAIR HFA/PROVENTIL HFA/VENTOLIN HFA) 108 (90 BASE) MCG/ACT Inhaler Inhale 2 puffs into the lungs every 4 hours as needed for shortness of breath / dyspnea or wheezing 1 Inhaler 1     amphetamine-dextroamphetamine (ADDERALL) 10 MG tablet Take 10 mg by mouth (Patient not taking: Reported on 1/27/2023)       diclofenac (VOLTAREN) 1 % topical gel Apply 4 g topically 4 times daily (Patient not taking: Reported on 10/14/2021) 150 g 0     estradiol (ESTRACE) 0.5 MG tablet Take 1 tablet (0.5 mg) by mouth daily (Patient not taking: Reported on 10/14/2021) 90 tablet 3     latanoprost (XALATAN) 0.005 %  ophthalmic solution        omeprazole (PRILOSEC) 20 MG DR capsule take 1 capsule by oral route 2 times every day 30 minutes to 1 hour before first meal and 12 hours later       polyethylene glycol (MIRALAX) 17 GM/Dose powder TAKE 3 CAPFULS BY MOUTH 3 times DAILY for constipation       RESTASIS 0.05 % ophthalmic emulsion        RETIN-A 0.1 % external cream        timolol maleate (TIMOPTIC) 0.5 % ophthalmic solution          Medications and history reviewed    Physical exam:  Vitals: /85   Pulse 85   Wt 97.1 kg (214 lb)   BMI 34.54 kg/m    BMI= Body mass index is 34.54 kg/m .    Constitutional: healthy, alert, and no distress  Head: Normocephalic. No masses, lesions, tenderness or abnormalities  Gastrointestinal: positive findings: obese, tender in epigastrium no palpable abnormalities  : Deferred  Musculoskeletal: extremities normal- no gross deformities noted, gait normal, and normal muscle tone  Skin: no suspicious lesions or rashes  Psychiatric: mentation appears normal and affect normal/bright      Imaging shows:  In [facility] system  CT ABDOMEN PELVIS W  Order: [id number]  Impression    1.  No evidence for bowel obstruction or inflammatory changes.  2.  Small esophageal hiatal hernia.  3.  Mild hepatic steatosis.  4.  Prior hysterectomy. No adnexal lesions or free fluid.  5.  Large calcified granuloma right lung base.    EGD 2021  No hiatal hernia noted  Biopsies for Schwab's and H.pylori negative.    Gastric Emptying  EXAM: NM GASTRIC EMPTYING  LOCATION: [facility]  DATE/TIME: 3/3/2022 7:30 AM     INDICATION: Abdominal pain and early satiety. Gastroparesis evaluation.   COMPARISON: CT of the abdomen pelvis dated 09/25/2019.  TECHNIQUE: 1.0 mCi of technetium-99m sulfur colloid labeled egg product. Oral ingestion of standard meal. Anterior and posterior planar imaging for four hours. Geometric mean of emptying/retention calculated.     FINDINGS: The stomach is scintigraphically  normal. No evidence of gastroesophageal reflux.     PATIENT'S RETENTION: 1 hour = 53%, 2 hours = 16%, 3 hours = 7%, 4 hours = N/A  NORMAL RETENTION: 1 hour = <90%, 2 hours = <60%, 3 hours = <30%, 4 hours = <10%                                                                      IMPRESSION:      Slightly accelerated gastric emptying.    Assessment:     ICD-10-CM    1. Abdominal pain, generalized  R10.84 Adult General Surg Referral     Adult GI  Referral - Procedure Only        Plan: Epigastric abdominal pain that seems different from her heartburn and is not helped by omeprazole.  Did have small hiatal hernia seen on her CT scan a few months ago but that is unlikely to give this abdominal wall type pain.  No hernia on CT or clinical exam, no other abnormality felt but area is definitely tender.  Is been a few years since her last EGD so went ahead and ordered that to check to see if tensional cause of this pain identified.  Ordered with MAC due to previous issues without where as when she had sedation it was a better experience for her.  Advised her to follow-up back with Dr. Rogers regarding this lung granuloma in case there is anything further that would need to be done for that.  If pain is ongoing and no cause seen on EGD then might consider new CT scan.  At this point I do not see any surgical cause of pain.  Follow-up with me as needed.    Serafin Quesada MD      Again, thank you for allowing me to participate in the care of your patient.        Sincerely,        Serafin Quesada MD

## 2023-11-20 NOTE — PROGRESS NOTES
Patient seen in consultation for epigastric pain by Aristides Rogers    HPI:  Patient is a 58 year old female  with complaints of epigastric pain when bending over and moving around. Pain can come and go but seems to feel it more often than not.  The patient noticed the symptoms about 6 months ago.  Not related to eating as far as she can tell.  Had previous hernia repair with mesh, had postop infection- had it opened and had to pack it. Was in area of  scar low. Can bother her sometimes when lifting.  Also known hiatal hernia, has been taking omeprazole. This pain is different from her heartburn, the omeprazole does help that.  nothing makes the episode better.  Patient has not family history of hernia problems    Review Of Systems    Skin: negative  Ears/Nose/Throat: negative  Respiratory: No shortness of breath, dyspnea on exertion, cough, or hemoptysis  Cardiovascular: negative  Gastrointestinal: as above, reflux, and abdominal pain  Genitourinary: negative  Musculoskeletal: negative  Neurologic: negative  Hematologic/Lymphatic/Immunologic: negative  Endocrine: negative      Past Medical History:   Diagnosis Date    Asthma     asthma sx after resp infections    Constipation     Gastro-oesophageal reflux disease     PONV (postoperative nausea and vomiting)     Vaginal high risk HPV DNA test positive , ,     see problem list       Past Surgical History:   Procedure Laterality Date    ABDOMEN SURGERY      abdominal wall mass    ABDOMINOPLASTY      pt has post op infection    COSMETIC SURGERY      EXCISE LESION TRUNK  10/4/2011    Procedure:EXCISE LESION TRUNK; Excision Abdominal Wall Mass ; Surgeon:CARLITO BTETS; Location:[facility] OR    GYN SURGERY      HYSTERECTOMY      HYSTERECTOMY, PAP NO LONGER INDICATED  2018       Social History     Socioeconomic History    Marital status: Single     Spouse name: Not on file    Number of children: Not on file    Years of education: Not on file     Highest education level: Not on file   Occupational History    Not on file   Tobacco Use    Smoking status: Never    Smokeless tobacco: Never   Vaping Use    Vaping Use: Never used   Substance and Sexual Activity    Alcohol use: Yes     Comment: occasionally    Drug use: No    Sexual activity: Yes     Partners: Male     Birth control/protection: Condom   Other Topics Concern    Parent/sibling w/ CABG, MI or angioplasty before 65F 55M? Not Asked   Social History Narrative    Not on file     Social Determinants of Health     Financial Resource Strain: Not on file   Food Insecurity: Not on file   Transportation Needs: Not on file   Physical Activity: Not on file   Stress: Not on file   Social Connections: Not on file   Interpersonal Safety: Not on file   Housing Stability: Not on file       Current Outpatient Medications   Medication Sig Dispense Refill    albuterol (PROAIR HFA/PROVENTIL HFA/VENTOLIN HFA) 108 (90 Base) MCG/ACT inhaler Inhale 1-2 puffs into the lungs every 6 hours as needed for shortness of breath, wheezing or cough 18 g 0    albuterol (PROAIR HFA/PROVENTIL HFA/VENTOLIN HFA) 108 (90 BASE) MCG/ACT Inhaler Inhale 2 puffs into the lungs every 4 hours as needed for shortness of breath / dyspnea or wheezing 1 Inhaler 1    amphetamine-dextroamphetamine (ADDERALL) 10 MG tablet Take 10 mg by mouth (Patient not taking: Reported on 1/27/2023)      diclofenac (VOLTAREN) 1 % topical gel Apply 4 g topically 4 times daily (Patient not taking: Reported on 10/14/2021) 150 g 0    estradiol (ESTRACE) 0.5 MG tablet Take 1 tablet (0.5 mg) by mouth daily (Patient not taking: Reported on 10/14/2021) 90 tablet 3    latanoprost (XALATAN) 0.005 % ophthalmic solution       omeprazole (PRILOSEC) 20 MG DR capsule take 1 capsule by oral route 2 times every day 30 minutes to 1 hour before first meal and 12 hours later      polyethylene glycol (MIRALAX) 17 GM/Dose powder TAKE 3 CAPFULS BY MOUTH 3 times DAILY for constipation       RESTASIS 0.05 % ophthalmic emulsion       RETIN-A 0.1 % external cream       timolol maleate (TIMOPTIC) 0.5 % ophthalmic solution          Medications and history reviewed    Physical exam:  Vitals: /85   Pulse 85   Wt 97.1 kg (214 lb)   BMI 34.54 kg/m    BMI= Body mass index is 34.54 kg/m .    Constitutional: healthy, alert, and no distress  Head: Normocephalic. No masses, lesions, tenderness or abnormalities  Gastrointestinal: positive findings: obese, tender in epigastrium no palpable abnormalities  : Deferred  Musculoskeletal: extremities normal- no gross deformities noted, gait normal, and normal muscle tone  Skin: no suspicious lesions or rashes  Psychiatric: mentation appears normal and affect normal/bright      Imaging shows:  In [facility] system  CT ABDOMEN PELVIS W  Order: [id number]  Impression    1.  No evidence for bowel obstruction or inflammatory changes.  2.  Small esophageal hiatal hernia.  3.  Mild hepatic steatosis.  4.  Prior hysterectomy. No adnexal lesions or free fluid.  5.  Large calcified granuloma right lung base.    EGD 2021  No hiatal hernia noted  Biopsies for Schwab's and H.pylori negative.    Gastric Emptying  EXAM: NM GASTRIC EMPTYING  LOCATION: [facility]  DATE/TIME: 3/3/2022 7:30 AM     INDICATION: Abdominal pain and early satiety. Gastroparesis evaluation.   COMPARISON: CT of the abdomen pelvis dated 09/25/2019.  TECHNIQUE: 1.0 mCi of technetium-99m sulfur colloid labeled egg product. Oral ingestion of standard meal. Anterior and posterior planar imaging for four hours. Geometric mean of emptying/retention calculated.     FINDINGS: The stomach is scintigraphically normal. No evidence of gastroesophageal reflux.     PATIENT'S RETENTION: 1 hour = 53%, 2 hours = 16%, 3 hours = 7%, 4 hours = N/A  NORMAL RETENTION: 1 hour = <90%, 2 hours = <60%, 3 hours = <30%, 4 hours = <10%                                                                       IMPRESSION:      Slightly accelerated gastric emptying.    Assessment:     ICD-10-CM    1. Abdominal pain, generalized  R10.84 Adult General Surg Referral     Adult GI  Referral - Procedure Only        Plan: Epigastric abdominal pain that seems different from her heartburn and is not helped by omeprazole.  Did have small hiatal hernia seen on her CT scan a few months ago but that is unlikely to give this abdominal wall type pain.  No hernia on CT or clinical exam, no other abnormality felt but area is definitely tender.  Is been a few years since her last EGD so went ahead and ordered that to check to see if tensional cause of this pain identified.  Ordered with MAC due to previous issues without where as when she had sedation it was a better experience for her.  Advised her to follow-up back with Dr. Rogers regarding this lung granuloma in case there is anything further that would need to be done for that.  If pain is ongoing and no cause seen on EGD then might consider new CT scan.  At this point I do not see any surgical cause of pain.  Follow-up with me as needed.    Serafin Quesada MD

## 2023-12-04 ENCOUNTER — TRANSFERRED RECORDS (OUTPATIENT)
Dept: HEALTH INFORMATION MANAGEMENT | Facility: CLINIC | Age: 58
End: 2023-12-04
Payer: COMMERCIAL

## 2024-01-17 ENCOUNTER — TRANSFERRED RECORDS (OUTPATIENT)
Dept: HEALTH INFORMATION MANAGEMENT | Facility: CLINIC | Age: 59
End: 2024-01-17
Payer: COMMERCIAL

## 2024-01-21 ENCOUNTER — HEALTH MAINTENANCE LETTER (OUTPATIENT)
Age: 59
End: 2024-01-21

## 2024-02-01 ENCOUNTER — TELEPHONE (OUTPATIENT)
Dept: FAMILY MEDICINE | Facility: CLINIC | Age: 59
End: 2024-02-01

## 2024-02-01 ENCOUNTER — OFFICE VISIT (OUTPATIENT)
Dept: FAMILY MEDICINE | Facility: CLINIC | Age: 59
End: 2024-02-01
Payer: COMMERCIAL

## 2024-02-01 VITALS
DIASTOLIC BLOOD PRESSURE: 77 MMHG | BODY MASS INDEX: 33.24 KG/M2 | TEMPERATURE: 98.3 F | HEIGHT: 67 IN | WEIGHT: 211.8 LBS | SYSTOLIC BLOOD PRESSURE: 124 MMHG | HEART RATE: 89 BPM | OXYGEN SATURATION: 99 % | RESPIRATION RATE: 20 BRPM

## 2024-02-01 DIAGNOSIS — E66.811 CLASS 1 OBESITY IN ADULT, UNSPECIFIED BMI, UNSPECIFIED OBESITY TYPE, UNSPECIFIED WHETHER SERIOUS COMORBIDITY PRESENT: Primary | ICD-10-CM

## 2024-02-01 DIAGNOSIS — R73.03 PREDIABETES: ICD-10-CM

## 2024-02-01 DIAGNOSIS — Z83.3 FHX: TYPE 2 DIABETES MELLITUS: ICD-10-CM

## 2024-02-01 DIAGNOSIS — E66.811 CLASS 1 OBESITY IN ADULT, UNSPECIFIED BMI, UNSPECIFIED OBESITY TYPE, UNSPECIFIED WHETHER SERIOUS COMORBIDITY PRESENT: ICD-10-CM

## 2024-02-01 PROCEDURE — 99214 OFFICE O/P EST MOD 30 MIN: CPT | Performed by: FAMILY MEDICINE

## 2024-02-01 ASSESSMENT — PAIN SCALES - GENERAL: PAINLEVEL: NO PAIN (0)

## 2024-02-01 NOTE — PROGRESS NOTES
"ASSESSMENT / PLAN:  (E66.9) Class 1 obesity in adult, unspecified BMI, unspecified obesity type, unspecified whether serious comorbidity present  (primary encounter diagnosis)  Comment: needs help  Plan: Semaglutide-Weight Management (WEGOVY) 0.25         MG/0.5ML pen        Wellbutrin if not covered/available. Can see weight loss clinic too.   Low carb diet/high protein, intermittent fasting and regular exercise. Reveiwed risks and side effects of medication  Recheck in 6 months  Call/email with questions/concerns      (R73.03) Prediabetes  Plan: Semaglutide-Weight Management (WEGOVY) 0.25         MG/0.5ML pen        See above. Recheck in 6 months      (Z83.3) FHx: type 2 diabetes mellitus  Plan: Semaglutide-Weight Management (WEGOVY) 0.25         MG/0.5ML pen                Giovanna Melara is a 58 year old, presenting for the following health issues:    History obesity and hyperglycemia. Normal lipids in past.non-smoker.  Mom with dm/sister. No wellbutrin in past. Adderall was not helpful.   Was on phenitime in past.   Weight loss clinic.  Health- fruits/veggies. Limited bread. Fish lots. Exercise - needs more.   Some exercise at home. Weight/treadmill.   Emotionally doing.  No steroids recently - not in past 2 years. No pop.   Weight Problem      2/1/2024    11:26 AM   Additional Questions   Roomed by JENNY Morales CMA     History of Present Illness       Reason for visit:  Get a prescription for ozempic    She eats 4 or more servings of fruits and vegetables daily.She consumes 0 sweetened beverage(s) daily.She exercises with enough effort to increase her heart rate 20 to 29 minutes per day.  She exercises with enough effort to increase her heart rate 6 days per week.   She is taking medications regularly.           Objective    /77   Pulse 89   Temp 98.3  F (36.8  C) (Oral)   Resp 20   Ht 1.689 m (5' 6.5\")   Wt 96.1 kg (211 lb 12.8 oz)   SpO2 99%   BMI 33.67 kg/m    Body mass index is 33.67 " kg/m .  Physical Exam   GENERAL: alert and no distress  EYES: Eyes grossly normal to inspection, PERRL and conjunctivae and sclerae normal  NECK: no adenopathy, no asymmetry, masses, or scars  RESP: lungs clear to auscultation - no rales, rhonchi or wheezes  CV: regular rate and rhythm, normal S1 S2, no S3 or S4, no murmur, click or rub, no peripheral edema   ABDOMEN: soft, nontender, no hepatosplenomegaly, no masses and bowel sounds normal  MS: no gross musculoskeletal defects noted, no edema  PSYCH: mentation appears normal, affect normal/bright          Signed Electronically by: Aristides Rogers MD

## 2024-02-01 NOTE — TELEPHONE ENCOUNTER
General Call      Reason for Call:  Pt saw Dr Rogers today, spoke with insurance and is having a hard time finding Ozempic and Wegovy.     What are your questions or concerns:  Pt is wondering if Dr Rogers can write a prescriptions for both Ozempic and Wegovy and do a prior auth for both medications per insurance. Pt also gave wrong pharmacy, the correct pharmacy is [facility] [address]    Date of last appointment with provider: 02/01/2024    Could we send this information to you in [employer]) or would you prefer to receive a phone call?:   Patient would prefer a phone call   Okay to leave a detailed message?: Yes at Cell number on file:    Telephone Information:   Mobile [phone]

## 2024-02-01 NOTE — TELEPHONE ENCOUNTER
Prior Authorization Retail Medication Request    Medication/Dose:   Diagnosis and ICD code (if different than what is on RX):  Semaglutide-Weight Management (WEGOVY) 0.25 MG/0.5ML pen   New/renewal/insurance change PA/secondary ins. PA:  Previously Tried and Failed:    Rationale:      Insurance   Primary: Phone [phone]  Insurance ID:  [id number]    Secondary (if applicable):  Insurance ID:      Pharmacy Information (if different than what is on RX)  Name:  [facility]  Phone:  [phone]  Fax:[phone]

## 2024-02-08 ENCOUNTER — TELEPHONE (OUTPATIENT)
Dept: FAMILY MEDICINE | Facility: CLINIC | Age: 59
End: 2024-02-08
Payer: COMMERCIAL

## 2024-02-08 NOTE — TELEPHONE ENCOUNTER
Prior Authorization Retail Medication Request    Medication/Dose: OZEMPIC 0.25MG  Diagnosis and ICD code (if different than what is on RX):    New/renewal/insurance change PA/secondary ins. PA:  Previously Tried and Failed:    Rationale:      Insurance   Primary: FIFI REEVES  Insurance ID:  [id number]    Secondary (if applicable):  Insurance ID:      Pharmacy Information (if different than what is on RX)  Name:  [facility]   Phone:  [phone]  Fax:[phone]         Thank you,   Nagi Bae, [facility]

## 2024-02-08 NOTE — TELEPHONE ENCOUNTER
Incoming call from staff at [facility].    She states they have Ozempic in-stock, and pt requested that Dr. Rogers send new order for Ozempic to [facility].    Please review medication list, and discontinue orders for Wegovy if replacing with Ozempic.    Padma Addison, KATIN, RN

## 2024-02-09 ENCOUNTER — TELEPHONE (OUTPATIENT)
Dept: FAMILY MEDICINE | Facility: CLINIC | Age: 59
End: 2024-02-09
Payer: COMMERCIAL

## 2024-02-09 DIAGNOSIS — E66.811 CLASS 1 OBESITY IN ADULT, UNSPECIFIED BMI, UNSPECIFIED OBESITY TYPE, UNSPECIFIED WHETHER SERIOUS COMORBIDITY PRESENT: ICD-10-CM

## 2024-02-09 DIAGNOSIS — Z83.3 FHX: TYPE 2 DIABETES MELLITUS: ICD-10-CM

## 2024-02-09 DIAGNOSIS — R73.03 PREDIABETES: ICD-10-CM

## 2024-02-09 NOTE — TELEPHONE ENCOUNTER
Patient calling to make sure the PA that has been started for Ozempic was linked to the correct pharmacy.   Provider originally sent it to [facility] pharmacy but she wants to get it from the [facility] pharmacy.     This RN reviewed chart and informed patient that the PA for this medication was started yesterday, is for the correct pharmacy, and are taking about 8 days to complete.  Patient did not have any further questions.    Geraldine PARIKHN, RN

## 2024-02-14 NOTE — TELEPHONE ENCOUNTER
Central Prior Authorization Team   Phone: [phone]    PA Initiation    Medication: Semaglutide-Weight Management (WEGOVY) 0.25 MG/0.5ML pen  Insurance Company: [employer] - Phone [phone] Fax [phone]  Pharmacy Filling the Rx: [id number] - [place], [place] - [address] [facility]  Filling Pharmacy Phone: [phone]  Filling Pharmacy Fax:    Start Date: 2/14/2024

## 2024-02-16 NOTE — TELEPHONE ENCOUNTER
Prior Authorization Approval    Authorization Effective Date: 2/15/2024  Authorization Expiration Date: 8/15/2024  Medication: Semaglutide-Weight Management (WEGOVY) 0.25 MG/0.5ML pen-PA APPROVED   Approved Dose/Quantity:   Reference #:     Insurance Company: [employer] - Phone [phone] Fax [phone]  Expected CoPay:       CoPay Card Available:      Foundation Assistance Needed:    Which Pharmacy is filling the prescription (Not needed for infusion/clinic administered): [id number] - [place], [place] - [address]  Pharmacy Notified:  Yes- **Instructed pharmacy to notify patient when script is ready to /ship.**- Pharmacy stated that they have a paid claim on medication, however, pharmacy is not able to get medication due to shortage. Requested pharmacy to notify the patient on medication coverage and shortage.   Patient Notified:  Yes

## 2024-02-20 ENCOUNTER — E-VISIT (OUTPATIENT)
Dept: URGENT CARE | Facility: CLINIC | Age: 59
End: 2024-02-20
Payer: COMMERCIAL

## 2024-02-20 DIAGNOSIS — J01.90 ACUTE SINUSITIS WITH SYMPTOMS > 10 DAYS: Primary | ICD-10-CM

## 2024-02-20 PROCEDURE — 99207 PR NON-BILLABLE SERV PER CHARTING: CPT | Performed by: FAMILY MEDICINE

## 2024-02-20 NOTE — PATIENT INSTRUCTIONS
Dear Saritha Pearson    After reviewing your responses, I've been able to diagnose you with Acute sinusitis with symptoms > 10 days.      Based on your responses and diagnosis, I have prescribed   Orders Placed This Encounter   Medications     amoxicillin-clavulanate (AUGMENTIN) 875-125 MG tablet     Sig: Take 1 tablet by mouth 2 times daily for 7 days     Dispense:  14 tablet     Refill:  0      to treat your symptoms. I have sent this to your pharmacy.?     It is also important to stay well hydrated, get lots of rest and take over-the-counter decongestants,?tylenol?or ibuprofen if you?are able to?take those medications per your primary care provider to help relieve discomfort.?     It is important that you take?all of?your prescribed medication even if your symptoms are improving after a few doses.? Taking?all of?your medicine helps prevent the symptoms from returning.?     If your symptoms worsen, you develop severe headache, vomiting, high fever (>102), or are not improving in 7 days, please contact your primary care provider for an appointment or visit any of our convenient Walk-in Care or Urgent Care Centers to be seen which can be found on our website?here.?     Thanks again for choosing?us?as your health care partner,?   ?  Catherine Busch MD?   Thank you for choosing us for your care. I have placed an order for a prescription so that you can start treatment. View your full visit summary for details by clicking on the link below. Your pharmacist will able to address any questions you may have about the medication.     If you're not feeling better within 5-7 days, please schedule an appointment.  You can schedule an appointment right here in [facility], or call [phone]  If the visit is for the same symptoms as your eVisit, we'll refund the cost of your eVisit if seen within seven days.

## 2024-02-22 NOTE — TELEPHONE ENCOUNTER
Central Prior Authorization Team   Phone: [phone]    PA Initiation    Medication: OZEMPIC 0.25MG  Insurance Company: [employer] - Phone [phone] Fax [phone]  Pharmacy Filling the Rx: [facility] - [address] [facility]  Filling Pharmacy Phone: [phone]  Filling Pharmacy Fax:    Start Date: 2/22/2024

## 2024-02-27 NOTE — TELEPHONE ENCOUNTER
PRIOR AUTHORIZATION DENIED    Medication: OZEMPIC 0.25MG-PA DENIED     Denial Date: 2/23/2024    Denial Rational:           Appeal Information:

## 2024-03-06 ENCOUNTER — E-VISIT (OUTPATIENT)
Dept: URGENT CARE | Facility: CLINIC | Age: 59
End: 2024-03-06
Payer: COMMERCIAL

## 2024-03-06 DIAGNOSIS — J01.90 ACUTE SINUSITIS WITH SYMPTOMS > 10 DAYS: Primary | ICD-10-CM

## 2024-03-06 PROCEDURE — 99207 PR NON-BILLABLE SERV PER CHARTING: CPT | Performed by: NURSE PRACTITIONER

## 2024-03-06 NOTE — PATIENT INSTRUCTIONS
Acute Sinusitis: Care Instructions  Overview     Acute sinusitis is an inflammation of the mucous membranes inside the nose and sinuses. Sinuses are the hollow spaces in your skull around the eyes and nose. Acute sinusitis often follows a cold. Acute sinusitis causes thick, discolored mucus that drains from the nose or down the back of the throat. It also can cause pain and pressure in your head and face along with a stuffy or blocked nose.  In most cases, sinusitis gets better on its own in 1 to 2 weeks. But some mild symptoms may last for several weeks. Sometimes antibiotics are needed if there is a bacterial infection.  Follow-up care is a key part of your treatment and safety. Be sure to make and go to all appointments, and call your doctor if you are having problems. It's also a good idea to know your test results and keep a list of the medicines you take.  How can you care for yourself at home?  Use saline (saltwater) nasal washes. This can help keep your nasal passages open and wash out mucus and allergens.  You can buy saline nose washes at a grocery store or drugstore. Follow the instructions on the package.  You can make your own at home. Add 1 teaspoon of non-iodized salt and 1 teaspoon of baking soda to 2 cups of distilled or boiled and cooled water. Fill a squeeze bottle or a nasal cleansing pot (such as a neti pot) with the nasal wash. Then put the tip into your nostril, and lean over the sink. With your mouth open, gently squirt the liquid. Repeat on the other side.  Try a decongestant nasal spray like oxymetazoline (Afrin). Do not use it for more than 3 days in a row. Using it for more than 3 days can make your congestion worse.  If needed, take an over-the-counter pain medicine, such as acetaminophen (Tylenol), ibuprofen (Advil, Motrin), or naproxen (Aleve). Read and follow all instructions on the label.  If the doctor prescribed antibiotics, take them as directed. Do not stop taking them just  "because you feel better. You need to take the full course of antibiotics.  Be careful when taking over-the-counter cold or flu medicines and Tylenol at the same time. Many of these medicines have acetaminophen, which is Tylenol. Read the labels to make sure that you are not taking more than the recommended dose. Too much acetaminophen (Tylenol) can be harmful.  Try a steroid nasal spray. It may help with your symptoms.  Breathe warm, moist air. You can use a steamy shower, a hot bath, or a sink filled with hot water. Avoid cold, dry air. Using a humidifier in your home may help. Follow the directions for cleaning the machine.  When should you call for help?   Call your doctor now or seek immediate medical care if:    You have new or worse swelling, redness, or pain in your face or around one or both of your eyes.     You have double vision or a change in your vision.     You have a high fever.     You have a severe headache and a stiff neck.     You have mental changes, such as feeling confused or much less alert.   Watch closely for changes in your health, and be sure to contact your doctor if:    You are not getting better as expected.   Where can you learn more?  Go to https://www.[employer].net/patiented  Enter I933 in the search box to learn more about \"Acute Sinusitis: Care Instructions.\"  Current as of: February 28, 2023               Content Version: 13.8    7360-9453 [employer].   Care instructions adapted under license by your healthcare professional. If you have questions about a medical condition or this instruction, always ask your healthcare professional. [employer] disclaims any warranty or liability for your use of this information.      Dear Saritha Pearson    After reviewing your responses, I've been able to diagnose you with Acute sinusitis with symptoms > 10 days.      Based on your responses and diagnosis, I have prescribed   Orders Placed This Encounter   Medications "     amoxicillin-clavulanate (AUGMENTIN) 875-125 MG tablet     Sig: Take 1 tablet by mouth 2 times daily for 7 days     Dispense:  14 tablet     Refill:  0      to treat your symptoms. I have sent this to your pharmacy.?     It is also important to stay well hydrated, get lots of rest and take over-the-counter decongestants,?tylenol?or ibuprofen if you?are able to?take those medications per your primary care provider to help relieve discomfort.?     It is important that you take?all of?your prescribed medication even if your symptoms are improving after a few doses.? Taking?all of?your medicine helps prevent the symptoms from returning.?     If your symptoms worsen, you develop severe headache, vomiting, high fever (>102), or are not improving in 7 days, please contact your primary care provider for an appointment or visit any of our convenient Walk-in Care or Urgent Care Centers to be seen which can be found on our website?here.?     Thanks again for choosing?us?as your health care partner,?   ?  Vickey Gagnon NP?   Thank you for choosing us for your care. I have placed an order for a prescription so that you can start treatment. View your full visit summary for details by clicking on the link below. Your pharmacist will able to address any questions you may have about the medication.     If you're not feeling better within 5-7 days, please schedule an appointment.  You can schedule an appointment right here in [facility], or call [phone]  If the visit is for the same symptoms as your eVisit, we'll refund the cost of your eVisit if seen within seven days.

## 2024-03-16 ENCOUNTER — MYC MEDICAL ADVICE (OUTPATIENT)
Dept: FAMILY MEDICINE | Facility: CLINIC | Age: 59
End: 2024-03-16
Payer: COMMERCIAL

## 2024-03-16 DIAGNOSIS — E66.811 CLASS 1 OBESITY IN ADULT, UNSPECIFIED BMI, UNSPECIFIED OBESITY TYPE, UNSPECIFIED WHETHER SERIOUS COMORBIDITY PRESENT: ICD-10-CM

## 2024-03-16 DIAGNOSIS — Z83.3 FHX: TYPE 2 DIABETES MELLITUS: ICD-10-CM

## 2024-03-16 DIAGNOSIS — R73.03 PREDIABETES: ICD-10-CM

## 2024-03-18 ENCOUNTER — MYC MEDICAL ADVICE (OUTPATIENT)
Dept: FAMILY MEDICINE | Facility: CLINIC | Age: 59
End: 2024-03-18
Payer: COMMERCIAL

## 2024-03-18 NOTE — TELEPHONE ENCOUNTER
Please see 3/16 VivoText message.     Patient sending in same message.       Blessing Alejandra RN    [facility]

## 2024-03-18 NOTE — LETTER
3/20/2024    INSURER: Payor: FIFI / Plan: FIFI PMAP / Product Type: HMO /   ATTN:   Re: Prior Authorization Request  Patient: Saritha Pearson  Policy ID#:  [id number]  : 1965      To Whom it May Concern:    I am writing to formally request a prior authorization of coverage for my patient,  Saritha Pearson, for treatment using [LIST SPECIFIC THERAPY].  I am requesting authorization for applicable provider professional and facility services associated with this therapy.    The therapy involves [Brief description of recommended therapy and time length of anticipated therapy].      The benefits of the therapy include [reference standard of care or clinical trial supportive evidence and results].      I have treated Saritha Pearson since [DATE] and I have determined that it is medically appropriate for  this patient to receive be treated with  [ LIST SPECIFIC THERAPY ]  for the reason(s) stated below:    [Describe patient s medical condition with exact diagnosis and symptoms associated with the disease]    [List failed drug treatments and side effects and other therapies tried and failed. Add Additional Clinical Detail, for example, note why/if patient is not a suitable candidate for other therapy. Be specific and provide details ]    [Discuss why the procedure is the most appropriate treatment for the patient s condition ]    [Discuss implications if patient does not get the therapy, eg outcomes, quality of life)]    I have included medical records pertaining to the patient s medical history, current condition and treatment plan.  In addition, the following billing codes will be used for therapy and follow-up: [INSERT ICD-10 DIAGNOSIS and CPT CODES].      Attached are [LIST ANY APPLICABLE ATTACHMENTS EG, ARTICLES, INSERTS, PROTOCOLS etc. ] for your reference.    I firmly believe that this therapy is clinically appropriate and that Saritha Pearson would benefit from improved [clinical outcomes, quality of life] if  allowed the opportunity to receive this treatment.  Please contact me at Dept: [phone] if you require additional information to ensure the prompt approval for coverage.    Please send your written decision to me at this address:  [facility]  [id number] [place] [id number] [phone]  Dept: [phone]        Sincerely,      Aristides Rogers MD        Enclosures

## 2024-03-19 NOTE — TELEPHONE ENCOUNTER
Patient requesting an exception letter to her insurance to get larger quantities of the wegovy 0.25 mg and the 0.5 mg.    Geraldine Jung BSN, RN

## 2024-03-20 ENCOUNTER — MYC MEDICAL ADVICE (OUTPATIENT)
Dept: FAMILY MEDICINE | Facility: CLINIC | Age: 59
End: 2024-03-20
Payer: COMMERCIAL

## 2024-03-20 ENCOUNTER — TELEPHONE (OUTPATIENT)
Dept: FAMILY MEDICINE | Facility: CLINIC | Age: 59
End: 2024-03-20
Payer: COMMERCIAL

## 2024-03-20 NOTE — TELEPHONE ENCOUNTER
"Pt is calling in after sending a [employer] message today & yesterday regarding her Wegovy. Pt states she needs a \"exception of quantity letter for 0.25mg\". Pt also said she needs to begin a prior auth for the 0.5mg dose.     Routing to provider -   Can you please write an exception of quantity letter for Wegovy 0.25mg?    2.   Would you like to begin a prior auth for the Wegovy 0.5mg dose?    Thank you - Lorene Tejada, KATIN, RN        "

## 2024-03-20 NOTE — TELEPHONE ENCOUNTER
I never did a P.A. letter. I think it was done for me. Can we just copy that old one. Aristides Rogers MD

## 2024-03-20 NOTE — TELEPHONE ENCOUNTER
I did not see a medical necessity letter in the chart.   I pended a letter if you are wanting to move forward with this process  Please complete and route to TC when complete  Thank you,  Montse CURTIS    [phone]

## 2024-03-20 NOTE — LETTER
April 2, 2024      Saritha Pearson  [address]  [facility] [address]        To Whom It May Concern:    Saritha Pearson was seen in our clinic.please allow patient to start Wegovy 0.5mg weekly for her obesity and pre-diabetes.       Sincerely,

## 2024-03-20 NOTE — TELEPHONE ENCOUNTER
TC:  Please relay this to the patient and then route to Dr. Rogers to address when he returns.  Thank you. Francisca Galdamez R.N.

## 2024-03-20 NOTE — TELEPHONE ENCOUNTER
Pt sent in message yesterday and today. It was previously routed to provider yesterday. Messaged pt informing her that message has been sent to provider and requested she allow time for the provider to respond.     Thank you - Lorene Tejada, KATIN, RN

## 2024-03-22 ENCOUNTER — TELEPHONE (OUTPATIENT)
Dept: FAMILY MEDICINE | Facility: CLINIC | Age: 59
End: 2024-03-22
Payer: COMMERCIAL

## 2024-04-02 ENCOUNTER — TELEPHONE (OUTPATIENT)
Dept: FAMILY MEDICINE | Facility: CLINIC | Age: 59
End: 2024-04-02
Payer: COMMERCIAL

## 2024-04-02 NOTE — TELEPHONE ENCOUNTER
Prior Authorization Retail Medication Request    Dr Rogers did do a note in epic.    Medication/Dose: Semaglutide-Weight Management (WEGOVY) 0.5 MG/0.5ML pen  Diagnosis and ICD code (if different than what is on RX):    Class 1 obesity in adult, unspecified BMI, unspecified obesity type, unspecified whether serious comorbidity present [E66.9]      Prediabetes [R73.03]      FHx: type 2 diabetes mellitus [Z83.3]        New/renewal/insurance change PA/secondary ins. PA:  Previously Tried and Failed:    Rationale:      Insurance   Primary: Phone #: [phone]  Insurance ID:  [id number]    Secondary (if applicable):  Insurance ID:      Pharmacy Information (if different than what is on RX)  Name:  [facility]  Phone:  [phone]  Fax:[phone]

## 2024-04-02 NOTE — TELEPHONE ENCOUNTER
I do not even see that a PA was done for wegovy 0.5 mg. Will start PA in a new encounter.Stephany Austin [facility]

## 2024-04-13 NOTE — TELEPHONE ENCOUNTER
Prior Authorization Not Needed per Insurance    Medication: WEGOVY 0.5 MG/0.5ML SC SOAJ  Insurance Company: [employer] - Phone [phone] Fax [phone]  Expected CoPay: $    Pharmacy Filling the Rx: [facility]  [place], [place] - [address] [place] [address]  Pharmacy Notified: YES  Patient Notified: **Instructed pharmacy to notify patient when script is ready to /ship.**    Patient was able to get the 0.5mg dose from [facility] pharmacy on 03/19/2024.  Plan would want patient to move up to the next strength now the 1mg dose. Current approval covers all strengths until 08/15/2024

## 2024-04-17 ENCOUNTER — TRANSFERRED RECORDS (OUTPATIENT)
Dept: HEALTH INFORMATION MANAGEMENT | Facility: CLINIC | Age: 59
End: 2024-04-17
Payer: COMMERCIAL

## 2024-04-22 ENCOUNTER — E-VISIT (OUTPATIENT)
Dept: URGENT CARE | Facility: CLINIC | Age: 59
End: 2024-04-22
Payer: COMMERCIAL

## 2024-04-22 DIAGNOSIS — J01.90 ACUTE SINUSITIS WITH SYMPTOMS > 10 DAYS: Primary | ICD-10-CM

## 2024-04-22 PROCEDURE — 99207 PR NON-BILLABLE SERV PER CHARTING: CPT | Performed by: NURSE PRACTITIONER

## 2024-04-22 NOTE — PATIENT INSTRUCTIONS
Thank you for choosing us for your care. I have placed an order for a prescription so that you can start treatment. View your full visit summary for details by clicking on the link below. Your pharmacist will able to address any questions you may have about the medication.     If you're not feeling better within 5-7 days, please schedule an appointment.  You can schedule an appointment right here in [facility], or call [phone]  If the visit is for the same symptoms as your eVisit, we'll refund the cost of your eVisit if seen within seven days.

## 2024-05-01 ENCOUNTER — MYC MEDICAL ADVICE (OUTPATIENT)
Dept: FAMILY MEDICINE | Facility: CLINIC | Age: 59
End: 2024-05-01
Payer: COMMERCIAL

## 2024-05-01 DIAGNOSIS — Z83.3 FHX: TYPE 2 DIABETES MELLITUS: ICD-10-CM

## 2024-05-01 DIAGNOSIS — R73.03 PREDIABETES: ICD-10-CM

## 2024-05-01 DIAGNOSIS — E66.811 CLASS 1 OBESITY IN ADULT, UNSPECIFIED BMI, UNSPECIFIED OBESITY TYPE, UNSPECIFIED WHETHER SERIOUS COMORBIDITY PRESENT: ICD-10-CM

## 2024-05-04 ENCOUNTER — MYC REFILL (OUTPATIENT)
Dept: FAMILY MEDICINE | Facility: CLINIC | Age: 59
End: 2024-05-04
Payer: COMMERCIAL

## 2024-05-04 ENCOUNTER — MYC MEDICAL ADVICE (OUTPATIENT)
Dept: FAMILY MEDICINE | Facility: CLINIC | Age: 59
End: 2024-05-04
Payer: COMMERCIAL

## 2024-05-04 DIAGNOSIS — E66.811 CLASS 1 OBESITY IN ADULT, UNSPECIFIED BMI, UNSPECIFIED OBESITY TYPE, UNSPECIFIED WHETHER SERIOUS COMORBIDITY PRESENT: ICD-10-CM

## 2024-05-04 DIAGNOSIS — R73.03 PREDIABETES: ICD-10-CM

## 2024-05-04 DIAGNOSIS — Z83.3 FHX: TYPE 2 DIABETES MELLITUS: ICD-10-CM

## 2024-06-09 ENCOUNTER — HEALTH MAINTENANCE LETTER (OUTPATIENT)
Age: 59
End: 2024-06-09

## 2024-06-12 ENCOUNTER — TRANSFERRED RECORDS (OUTPATIENT)
Dept: HEALTH INFORMATION MANAGEMENT | Facility: CLINIC | Age: 59
End: 2024-06-12
Payer: COMMERCIAL

## 2024-06-12 LAB
CREATININE (EXTERNAL): 0.64 MG/DL (ref 0.57–1)
GFR ESTIMATED (EXTERNAL): 103 ML/MIN/1.73
GLUCOSE (EXTERNAL): 85 MG/DL (ref 70–99)
HBA1C MFR BLD: 6.1 % (ref 4.8–5.6)
POTASSIUM (EXTERNAL): 4.9 MMOL/L (ref 3.5–5.2)

## 2024-07-11 ENCOUNTER — E-VISIT (OUTPATIENT)
Dept: URGENT CARE | Facility: CLINIC | Age: 59
End: 2024-07-11
Payer: COMMERCIAL

## 2024-07-11 ENCOUNTER — VIRTUAL VISIT (OUTPATIENT)
Dept: FAMILY MEDICINE | Facility: CLINIC | Age: 59
End: 2024-07-11
Payer: COMMERCIAL

## 2024-07-11 DIAGNOSIS — Z83.3 FHX: TYPE 2 DIABETES MELLITUS: ICD-10-CM

## 2024-07-11 DIAGNOSIS — J01.90 ACUTE SINUSITIS WITH SYMPTOMS > 10 DAYS: Primary | ICD-10-CM

## 2024-07-11 DIAGNOSIS — E66.811 CLASS 1 OBESITY IN ADULT, UNSPECIFIED BMI, UNSPECIFIED OBESITY TYPE, UNSPECIFIED WHETHER SERIOUS COMORBIDITY PRESENT: ICD-10-CM

## 2024-07-11 DIAGNOSIS — R73.03 PREDIABETES: ICD-10-CM

## 2024-07-11 PROCEDURE — 99214 OFFICE O/P EST MOD 30 MIN: CPT | Mod: 95 | Performed by: FAMILY MEDICINE

## 2024-07-11 PROCEDURE — 99207 PR NON-BILLABLE SERV PER CHARTING: CPT | Performed by: EMERGENCY MEDICINE

## 2024-07-11 NOTE — PROGRESS NOTES
Saritha is a 58 year old who is being evaluated via a billable video visit.    How would you like to obtain your AVS? MyChart  If the video visit is dropped, the invitation should be resent by: Text to cell phone: [phone]  Will anyone else be joining your video visit? No      ASSESSMENT / PLAN:  (E66.9) Class 1 obesity in adult, unspecified BMI, unspecified obesity type, unspecified whether serious comorbidity present  Comment: doing well with wegovy  Plan: Semaglutide-Weight Management (WEGOVY) 1         MG/0.5ML pen        Continue exercise- more weight lifting and high protein diet. Recheck in 6 months  With previsit fasting labs    (R73.03) Prediabetes    Plan: Semaglutide-Weight Management (WEGOVY) 1         MG/0.5ML pen        See above. Continue lower carb diet.     (Z83.3) FHx: type 2 diabetes mellitus  Plan: Semaglutide-Weight Management (WEGOVY) 1         MG/0.5ML pen              Subjective   Saritha is a 58 year old, presenting for the following health issues:  Follow-up wegovy - started on in late winter and titrated up dosage.   History obesity and hyperglycemia. Normal lipids in past.non-smoker.  Mom with dm/sister  Insurance was covering. No side effects. No nausea, vomiting or diarrhea or abdominal pain.    Weight loss - feeling about 15 lbs.   Protein in diet - eats meat. Active and moving a lot.     Recheck Medication      7/11/2024     1:37 PM   Additional Questions   Roomed by JENNY Morales CMA     Video Start Time: 2:12 PM    HPI           Objective           Vitals:  No vitals were obtained today due to virtual visit.    Physical Exam   GENERAL: alert and no distress  EYES: Eyes grossly normal to inspection.  No discharge or erythema, or obvious scleral/conjunctival abnormalities.  RESP: No audible wheeze, cough, or visible cyanosis.    SKIN: Visible skin clear. No significant rash, abnormal pigmentation or lesions.  NEURO: Cranial nerves grossly intact.  Mentation and speech appropriate for  age.  PSYCH: Appropriate affect, tone, and pace of words          Video-Visit Details    Type of service:  Video Visit   Video End Time:2:23 PM  Originating Location (pt. Location): Home    Distant Location (provider location):  On-site  Platform used for Video Visit: Selin  Signed Electronically by: Aristides Rogers MD

## 2024-07-11 NOTE — PATIENT INSTRUCTIONS
Acute Sinusitis: Care Instructions  Overview     Acute sinusitis is an inflammation of the mucous membranes inside the nose and sinuses. Sinuses are the hollow spaces in your skull around the eyes and nose. Acute sinusitis often follows a cold. Acute sinusitis causes thick, discolored mucus that drains from the nose or down the back of the throat. It also can cause pain and pressure in your head and face along with a stuffy or blocked nose.  In most cases, sinusitis gets better on its own in 1 to 2 weeks. But some mild symptoms may last for several weeks. Sometimes antibiotics are needed if there is a bacterial infection.  Follow-up care is a key part of your treatment and safety. Be sure to make and go to all appointments, and call your doctor if you are having problems. It's also a good idea to know your test results and keep a list of the medicines you take.  How can you care for yourself at home?  Use saline (saltwater) nasal washes. This can help keep your nasal passages open and wash out mucus and allergens.  You can buy saline nose washes at a grocery store or drugstore. Follow the instructions on the package.  You can make your own at home. Add 1 teaspoon of non-iodized salt and 1 teaspoon of baking soda to 2 cups of distilled or boiled and cooled water. Fill a squeeze bottle or a nasal cleansing pot (such as a neti pot) with the nasal wash. Then put the tip into your nostril, and lean over the sink. With your mouth open, gently squirt the liquid. Repeat on the other side.  Try a decongestant nasal spray like oxymetazoline (Afrin). Do not use it for more than 3 days in a row. Using it for more than 3 days can make your congestion worse.  If needed, take an over-the-counter pain medicine, such as acetaminophen (Tylenol), ibuprofen (Advil, Motrin), or naproxen (Aleve). Read and follow all instructions on the label.  If the doctor prescribed antibiotics, take them as directed. Do not stop taking them just  "because you feel better. You need to take the full course of antibiotics.  Be careful when taking over-the-counter cold or flu medicines and Tylenol at the same time. Many of these medicines have acetaminophen, which is Tylenol. Read the labels to make sure that you are not taking more than the recommended dose. Too much acetaminophen (Tylenol) can be harmful.  Try a steroid nasal spray. It may help with your symptoms.  Breathe warm, moist air. You can use a steamy shower, a hot bath, or a sink filled with hot water. Avoid cold, dry air. Using a humidifier in your home may help. Follow the directions for cleaning the machine.  When should you call for help?   Call your doctor now or seek immediate medical care if:    You have new or worse swelling, redness, or pain in your face or around one or both of your eyes.     You have double vision or a change in your vision.     You have a high fever.     You have a severe headache and a stiff neck.     You have mental changes, such as feeling confused or much less alert.   Watch closely for changes in your health, and be sure to contact your doctor if:    You are not getting better as expected.   Where can you learn more?  Go to https://www.[employer].net/patiented  Enter I933 in the search box to learn more about \"Acute Sinusitis: Care Instructions.\"  Current as of: September 27, 2023               Content Version: 14.0    4064-0506 [employer].   Care instructions adapted under license by your healthcare professional. If you have questions about a medical condition or this instruction, always ask your healthcare professional. [employer] disclaims any warranty or liability for your use of this information.      Dear Saritha Pearson       Based on your responses and diagnosis, I have prescribed augmentin  to treat your symptoms. I have sent this to your pharmacy.?     It is also important to stay well hydrated, get lots of rest and take " over-the-counter decongestants,?tylenol?or ibuprofen if you?are able to?take those medications per your primary care provider to help relieve discomfort.?     It is important that you take?all of?your prescribed medication even if your symptoms are improving after a few doses.? Taking?all of?your medicine helps prevent the symptoms from returning.?     If your symptoms worsen, you develop severe headache, vomiting, high fever (>102), or are not improving in 7 days, please contact your primary care provider for an appointment or visit any of our convenient Walk-in Care or Urgent Care Centers to be seen which can be found on our website?here.?     Thanks again for choosing?us?as your health care partner,?   ?  Lorenzo Ovalle MD?   Thank you for choosing us for your care. I have placed an order for a prescription so that you can start treatment. View your full visit summary for details by clicking on the link below. Your pharmacist will able to address any questions you may have about the medication.     If you're not feeling better within 5-7 days, please schedule an appointment.  You can schedule an appointment right here in [facility], or call [phone]  If the visit is for the same symptoms as your eVisit, we'll refund the cost of your eVisit if seen within seven days.

## 2024-07-12 ENCOUNTER — OFFICE VISIT (OUTPATIENT)
Dept: ALLERGY | Facility: CLINIC | Age: 59
End: 2024-07-12
Payer: COMMERCIAL

## 2024-07-12 VITALS
DIASTOLIC BLOOD PRESSURE: 87 MMHG | WEIGHT: 216 LBS | SYSTOLIC BLOOD PRESSURE: 137 MMHG | BODY MASS INDEX: 34.34 KG/M2 | OXYGEN SATURATION: 99 % | HEART RATE: 73 BPM

## 2024-07-12 DIAGNOSIS — J45.20 MILD INTERMITTENT ASTHMA WITHOUT COMPLICATION: Primary | ICD-10-CM

## 2024-07-12 DIAGNOSIS — R06.7 SNEEZING: ICD-10-CM

## 2024-07-12 DIAGNOSIS — J30.1 SEASONAL ALLERGIC RHINITIS DUE TO POLLEN: ICD-10-CM

## 2024-07-12 DIAGNOSIS — J20.9 ACUTE BRONCHITIS, UNSPECIFIED ORGANISM: ICD-10-CM

## 2024-07-12 DIAGNOSIS — L29.9 ITCHING: ICD-10-CM

## 2024-07-12 PROCEDURE — 95004 PERQ TESTS W/ALRGNC XTRCS: CPT | Performed by: INTERNAL MEDICINE

## 2024-07-12 PROCEDURE — 99204 OFFICE O/P NEW MOD 45 MIN: CPT | Mod: 25 | Performed by: INTERNAL MEDICINE

## 2024-07-12 RX ORDER — ALBUTEROL SULFATE 90 UG/1
2 AEROSOL, METERED RESPIRATORY (INHALATION) EVERY 4 HOURS PRN
Qty: 18 G | Refills: 0 | Status: SHIPPED | OUTPATIENT
Start: 2024-07-12

## 2024-07-12 RX ORDER — AZELASTINE HCL 205.5 UG/1
2 SPRAY NASAL DAILY
Qty: 30 ML | Refills: 5 | Status: SHIPPED | OUTPATIENT
Start: 2024-07-12

## 2024-07-12 NOTE — PATIENT INSTRUCTIONS
Skin testing was positive to ragweed  Will check a spirometry in the near future.  Continue albuterol 2 puffs every 4 hours as needed.  Start Astepro nasal spray 1 to 2 sprays each nostril daily.  Change antihistamine to Allegra 180 mg daily  Labs

## 2024-07-12 NOTE — PROGRESS NOTES
Saritha Pearson was seen in the Allergy Clinic at [facility].    Saritha Pearson is a 58 year old female being seen today for evaluation for possible asthma as well as possible allergies.  Having symptoms of cough and wheeze as well as sneezing and rhinorrhea.  She is blowing her nose frequently.  She has had symptoms for at least 10 years.  Symptoms are 24 hours a day all year round and she is using Mucinex and Sudafed. Also using Chlorpheniramine which makes her sleepy.  She also has eye itching.    She is using albuterol 2 times a week which does provide benefit for the cough and wheeze.    Past Medical History:   Diagnosis Date    Asthma     asthma sx after resp infections    Constipation     Gastro-oesophageal reflux disease     PONV (postoperative nausea and vomiting)     Vaginal high risk HPV DNA test positive 2016, 2018, 2019    see problem list     Family History   Problem Relation Age of Onset    Cerebrovascular Disease Father 60    Cerebrovascular Disease Maternal Grandmother     Cancer - colorectal Maternal Grandfather 50    Cancer Paternal Grandmother     Cancer Paternal Grandfather     Cancer - colorectal Sister 40    Breast Cancer Paternal Aunt     Breast Cancer Paternal Aunt     Breast Cancer Paternal Aunt     Breast Cancer Paternal Aunt     Breast Cancer Paternal Aunt     Breast Cancer Other      Past Surgical History:   Procedure Laterality Date    ABDOMEN SURGERY      abdominal wall mass    ABDOMINOPLASTY      pt has post op infection    COSMETIC SURGERY      EXCISE LESION TRUNK  10/4/2011    Procedure:EXCISE LESION TRUNK; Excision Abdominal Wall Mass ; Surgeon:CARLITO BETTS; Location:[facility] OR    GYN SURGERY      HYSTERECTOMY      HYSTERECTOMY, PAP NO LONGER INDICATED  11/02/2018       ENVIRONMENTAL HISTORY:   Pets inside the house include 2 dog(s).  Do you smoke cigarettes or other recreational drugs? No There is/are 0 smokers living in the house. The house do not have a  damp basement.     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      Review of Systems      Current Outpatient Medications:     albuterol (PROAIR HFA/PROVENTIL HFA/VENTOLIN HFA) 108 (90 Base) MCG/ACT inhaler, Inhale 2 puffs into the lungs every 4 hours as needed for shortness of breath or wheezing, Disp: 18 g, Rfl: 0    amoxicillin-clavulanate (AUGMENTIN) 875-125 MG tablet, Take 1 tablet by mouth 2 times daily for 7 days, Disp: 14 tablet, Rfl: 0    amphetamine-dextroamphetamine (ADDERALL) 10 MG tablet, Take 10 mg by mouth, Disp: , Rfl:     azelastine (ASTEPRO) 0.15 % nasal spray, Spray 2 sprays into both nostrils daily, Disp: 30 mL, Rfl: 5    diclofenac (VOLTAREN) 1 % topical gel, Apply 4 g topically 4 times daily, Disp: 150 g, Rfl: 0    estradiol (ESTRACE) 0.5 MG tablet, Take 1 tablet (0.5 mg) by mouth daily, Disp: 90 tablet, Rfl: 3    latanoprost (XALATAN) 0.005 % ophthalmic solution, , Disp: , Rfl:     omeprazole (PRILOSEC) 20 MG DR capsule, take 1 capsule by oral route 2 times every day 30 minutes to 1 hour before first meal and 12 hours later, Disp: , Rfl:     polyethylene glycol (MIRALAX) 17 GM/Dose powder, TAKE 3 CAPFULS BY MOUTH 3 times DAILY for constipation, Disp: , Rfl:     RESTASIS 0.05 % ophthalmic emulsion, , Disp: , Rfl:     RETIN-A 0.1 % external cream, , Disp: , Rfl:     semaglutide (OZEMPIC) 2 MG/3ML pen, Inject 0.25 mg Subcutaneous every 7 days, Disp: 3 mL, Rfl: 1    Semaglutide-Weight Management (WEGOVY) 0.25 MG/0.5ML pen, Inject 0.25 mg Subcutaneous once a week, Disp: 2 mL, Rfl: 1    Semaglutide-Weight Management (WEGOVY) 0.25 MG/0.5ML pen, Inject 0.25 mg Subcutaneous once a week, Disp: 2 mL, Rfl: 1    Semaglutide-Weight Management (WEGOVY) 0.5 MG/0.5ML pen, Inject 0.5 mg Subcutaneous once a week This is double dosage, Disp: 2 mL, Rfl: 1    Semaglutide-Weight Management (WEGOVY) 1 MG/0.5ML pen, Inject 1 mg subcutaneously once a week, Disp: 3  mL, Rfl: 1    timolol maleate (TIMOPTIC) 0.5 % ophthalmic solution, , Disp: , Rfl:   Allergies   Allergen Reactions    Sulfa Antibiotics Hives, Itching and Rash         EXAM:   /87   Pulse 73   Wt 98 kg (216 lb)   SpO2 99%   BMI 34.34 kg/m      Physical Exam    Constitutional:       General: She is not in acute distress.     Appearance: Normal appearance. She is not ill-appearing.   HENT:      Head: Normocephalic and atraumatic.      Nose: Nose normal. No congestion or rhinorrhea.      Mouth/Throat:      Mouth: Mucous membranes are moist.      Pharynx: Oropharynx is clear. No posterior oropharyngeal erythema.   Eyes:      General:         Right eye: No discharge.         Left eye: No discharge.   Cardiovascular:      Rate and Rhythm: Normal rate and regular rhythm.      Heart sounds: Normal heart sounds.   Pulmonary:      Effort: Pulmonary effort is normal.      Breath sounds: Normal breath sounds. No wheezing or rhonchi.   Skin:     General: Skin is warm.      Findings: No erythema or rash.   Neurological:      General: No focal deficit present.      Mental Status: She is alert. Mental status is at baseline.   Psychiatric:         Mood and Affect: Mood normal.         Behavior: Behavior normal.      WORKUP: Skin testing is positive to ragweed only.    ENVIRONMENTAL PERCUTANEOUS SKIN TESTING: ADULT      7/12/2024     3:00 PM   [place] [facility]   [facility]   Ordering Physician Dr. Balbuena   Interpreting Physician Dr. Balbuena   Testing Technician Sanaz MORA RN   Location Back   Time start: 15:22   Time End: 15:37   Positive Control: Histatrol*ALK 1 mg/ml 5/20   Negative Control: 50% Glycerin 0   Cat Hair*ALK (10,000 BAU/ml) 0   AP Dog Hair/Dander (1:100 w/v) 0   Dust Mite p. 30,000 AU/ml 0   Dust Mite f. (30,000 AU/ml) 0   Danny (W/F in millimeters) 0   Kishan Grass (100,000 BAU/mL) 0   Red Cedar (W/F in millimeters) 0   Maple/[place]ald[place] (W/F in millimeters) 0   Hackberry (W/F in millimeters) 0   [place]  (W/F in millimeters) 0   [place] *ALK (W/F in millimeters) 0   American Elm (W/F in millimeters) 0   [place] (W/F in millimeters) 0   Black [place] (W/F in millimeters) 0   Birch Mix (W/F in millimeters) 0   [place] (W/F in millimeters) 0   Oak (W/F in millimeters) 0   Cocklebur (W/F in millimeters) 0   [place] (W/F in millimeters) 0   White Josué (W/F in millimeters) 0   Careless (W/F in millimeters) 0   Nettle (W/F in millimeters) 0   English Plantain (W/F in millimeters) 0   Kochia (W/F in millimeters) 0   Lamb's Quarter (W/F in millimeters) 0   Marshelder (W/F in millimeters) 0   Ragweed Mix* ALK (W/F in millimeters) 3/5   Russian Thistle (W/F in millimeters) 0   Sagebrush/Mugwort (W/F in millimeters) 0   Sheep Sorrel (W/F in millimeters) 0   Feather Mix* ALK (W/F in millimeters) 0   Penicillium Mix (1:10 w/v) 0   Curvularia spicifera (1:10 w/v) 0   Epicoccum (1:10 w/v) 0   Aspergillus fumigatus (1:10 w/v): 0   Alternaria tenius (1:10 w/v) 0   H. Cladosporium (1:10 w/v) 0   Phoma herbarum (1:10 w/v) 0         ASSESSMENT/PLAN:  Saritha Pearson is a 58 year old female seen today for evaluation possible asthma as well as allergies.  She did have a ragweed allergy contacted with this does not explain her year-round symptoms.  Will order blood test.  Will also order spirometry to further evaluate the cough and wheeze.    Skin testing was positive to ragweed  Will check a spirometry in the near future.  Continue albuterol 2 puffs every 4 hours as needed.  Start Astepro nasal spray 1 to 2 sprays each nostril daily.  Change antihistamine to Allegra 180 mg daily  Labs    Follow-up in 2 months      Thank you for allowing me to participate in the care of Saritha Pearson.      I spent 43 minutes on the date of the encounter doing chart review, history and exam, documentation and further coordination as noted above exclusive of separately reported interpretations    Lawrence Balbuena MD  Allergy/Immunology  [facility]  [facility] [place]

## 2024-07-12 NOTE — LETTER
7/12/2024      Saritha Pearson  [address]  [facility] [address]      Dear Colleague,    Thank you for referring your patient, Saritha Pearson, to the [facility] SPECIALTY [facility]. Please see a copy of my visit note below.    Saritha Pearson was seen in the Allergy Clinic at [facility].    Saritha Pearson is a 58 year old female being seen today for evaluation for possible asthma as well as possible allergies.  Having symptoms of cough and wheeze as well as sneezing and rhinorrhea.  She is blowing her nose frequently.  She has had symptoms for at least 10 years.  Symptoms are 24 hours a day all year round and she is using Mucinex and Sudafed. Also using Chlorpheniramine which makes her sleepy.  She also has eye itching.    She is using albuterol 2 times a week which does provide benefit for the cough and wheeze.    Past Medical History:   Diagnosis Date     Asthma     asthma sx after resp infections     Constipation      Gastro-oesophageal reflux disease      PONV (postoperative nausea and vomiting)      Vaginal high risk HPV DNA test positive 2016, 2018, 2019    see problem list     Family History   Problem Relation Age of Onset     Cerebrovascular Disease Father 60     Cerebrovascular Disease Maternal Grandmother      Cancer - colorectal Maternal Grandfather 50     Cancer Paternal Grandmother      Cancer Paternal Grandfather      Cancer - colorectal Sister 40     Breast Cancer Paternal Aunt      Breast Cancer Paternal Aunt      Breast Cancer Paternal Aunt      Breast Cancer Paternal Aunt      Breast Cancer Paternal Aunt      Breast Cancer Other      Past Surgical History:   Procedure Laterality Date     ABDOMEN SURGERY      abdominal wall mass     ABDOMINOPLASTY      pt has post op infection     COSMETIC SURGERY       EXCISE LESION TRUNK  10/4/2011    Procedure:EXCISE LESION TRUNK; Excision Abdominal Wall Mass ; Surgeon:CARLITO BETTS; Location:[facility] OR     GYN SURGERY        HYSTERECTOMY       HYSTERECTOMY, PAP NO LONGER INDICATED  11/02/2018       ENVIRONMENTAL HISTORY:   Pets inside the house include 2 dog(s).  Do you smoke cigarettes or other recreational drugs? No There is/are 0 smokers living in the house. The house do not have a damp basement.     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      Review of Systems      Current Outpatient Medications:      albuterol (PROAIR HFA/PROVENTIL HFA/VENTOLIN HFA) 108 (90 Base) MCG/ACT inhaler, Inhale 2 puffs into the lungs every 4 hours as needed for shortness of breath or wheezing, Disp: 18 g, Rfl: 0     amoxicillin-clavulanate (AUGMENTIN) 875-125 MG tablet, Take 1 tablet by mouth 2 times daily for 7 days, Disp: 14 tablet, Rfl: 0     amphetamine-dextroamphetamine (ADDERALL) 10 MG tablet, Take 10 mg by mouth, Disp: , Rfl:      azelastine (ASTEPRO) 0.15 % nasal spray, Spray 2 sprays into both nostrils daily, Disp: 30 mL, Rfl: 5     diclofenac (VOLTAREN) 1 % topical gel, Apply 4 g topically 4 times daily, Disp: 150 g, Rfl: 0     estradiol (ESTRACE) 0.5 MG tablet, Take 1 tablet (0.5 mg) by mouth daily, Disp: 90 tablet, Rfl: 3     latanoprost (XALATAN) 0.005 % ophthalmic solution, , Disp: , Rfl:      omeprazole (PRILOSEC) 20 MG DR capsule, take 1 capsule by oral route 2 times every day 30 minutes to 1 hour before first meal and 12 hours later, Disp: , Rfl:      polyethylene glycol (MIRALAX) 17 GM/Dose powder, TAKE 3 CAPFULS BY MOUTH 3 times DAILY for constipation, Disp: , Rfl:      RESTASIS 0.05 % ophthalmic emulsion, , Disp: , Rfl:      RETIN-A 0.1 % external cream, , Disp: , Rfl:      semaglutide (OZEMPIC) 2 MG/3ML pen, Inject 0.25 mg Subcutaneous every 7 days, Disp: 3 mL, Rfl: 1     Semaglutide-Weight Management (WEGOVY) 0.25 MG/0.5ML pen, Inject 0.25 mg Subcutaneous once a week, Disp: 2 mL, Rfl: 1     Semaglutide-Weight Management (WEGOVY) 0.25 MG/0.5ML pen, Inject 0.25 mg Subcutaneous  once a week, Disp: 2 mL, Rfl: 1     Semaglutide-Weight Management (WEGOVY) 0.5 MG/0.5ML pen, Inject 0.5 mg Subcutaneous once a week This is double dosage, Disp: 2 mL, Rfl: 1     Semaglutide-Weight Management (WEGOVY) 1 MG/0.5ML pen, Inject 1 mg subcutaneously once a week, Disp: 3 mL, Rfl: 1     timolol maleate (TIMOPTIC) 0.5 % ophthalmic solution, , Disp: , Rfl:   Allergies   Allergen Reactions     Sulfa Antibiotics Hives, Itching and Rash         EXAM:   /87   Pulse 73   Wt 98 kg (216 lb)   SpO2 99%   BMI 34.34 kg/m      Physical Exam    Constitutional:       General: She is not in acute distress.     Appearance: Normal appearance. She is not ill-appearing.   HENT:      Head: Normocephalic and atraumatic.      Nose: Nose normal. No congestion or rhinorrhea.      Mouth/Throat:      Mouth: Mucous membranes are moist.      Pharynx: Oropharynx is clear. No posterior oropharyngeal erythema.   Eyes:      General:         Right eye: No discharge.         Left eye: No discharge.   Cardiovascular:      Rate and Rhythm: Normal rate and regular rhythm.      Heart sounds: Normal heart sounds.   Pulmonary:      Effort: Pulmonary effort is normal.      Breath sounds: Normal breath sounds. No wheezing or rhonchi.   Skin:     General: Skin is warm.      Findings: No erythema or rash.   Neurological:      General: No focal deficit present.      Mental Status: She is alert. Mental status is at baseline.   Psychiatric:         Mood and Affect: Mood normal.         Behavior: Behavior normal.      WORKUP: Skin testing is positive to ragweed only.    ENVIRONMENTAL PERCUTANEOUS SKIN TESTING: ADULT      7/12/2024     3:00 PM   [place] [facility]   Consent [facility]   Ordering Physician Dr. Balbuena   Interpreting Physician Dr. Balbuena   Testing Technician Sanaz MORA, RN   Location Back   Time start: 15:22   Time End: 15:37   Positive Control: Histatrol*ALK 1 mg/ml 5/20   Negative Control: 50% Glycerin 0   Cat Hair*ALK (10,000 BAU/ml) 0   AP  Dog Hair/Dander (1:100 w/v) 0   Dust Mite p. 30,000 AU/ml 0   Dust Mite f. (30,000 AU/ml) 0   Danny (W/F in millimeters) 0   Kishan Grass (100,000 BAU/mL) 0   Red Cedar (W/F in millimeters) 0   Maple/[place] (W/F in millimeters) 0   Hackberry (W/F in millimeters) 0   [place] (W/F in millimeters) 0   [place] *ALK (W/F in millimeters) 0   American Elm (W/F in millimeters) 0   [place] (W/F in millimeters) 0   Black [place] (W/F in millimeters) 0   Birch Mix (W/F in millimeters) 0   [place] (W/F in millimeters) 0   Oak (W/F in millimeters) 0   Cocklebur (W/F in millimeters) 0   [place] (W/F in millimeters) 0   White Josué (W/F in millimeters) 0   Careless (W/F in millimeters) 0   Nettle (W/F in millimeters) 0   English Plantain (W/F in millimeters) 0   Kochia (W/F in millimeters) 0   Lamb's Quarter (W/F in millimeters) 0   Marshelder (W/F in millimeters) 0   Ragweed Mix* ALK (W/F in millimeters) 3/5   Russian Thistle (W/F in millimeters) 0   Sagebrush/Mugwort (W/F in millimeters) 0   Sheep Sorrel (W/F in millimeters) 0   Feather Mix* ALK (W/F in millimeters) 0   Penicillium Mix (1:10 w/v) 0   Curvularia spicifera (1:10 w/v) 0   Epicoccum (1:10 w/v) 0   Aspergillus fumigatus (1:10 w/v): 0   Alternaria tenius (1:10 w/v) 0   H. Cladosporium (1:10 w/v) 0   Phoma herbarum (1:10 w/v) 0         ASSESSMENT/PLAN:  Saritha Pearson is a 58 year old female seen today for evaluation possible asthma as well as allergies.  She did have a ragweed allergy contacted with this does not explain her year-round symptoms.  Will order blood test.  Will also order spirometry to further evaluate the cough and wheeze.    Skin testing was positive to ragweed  Will check a spirometry in the near future.  Continue albuterol 2 puffs every 4 hours as needed.  Start Astepro nasal spray 1 to 2 sprays each nostril daily.  Change antihistamine to Allegra 180 mg daily  Labs    Follow-up in 2 months      Thank you for allowing me to participate in  the care of Saritha Pearson.      I spent 43 minutes on the date of the encounter doing chart review, history and exam, documentation and further coordination as noted above exclusive of separately reported interpretations    Lawrence Balbuena MD  Allergy/Immunology  [facility]      Again, thank you for allowing me to participate in the care of your patient.        Sincerely,        Lawrence Balbuena MD

## 2024-07-22 ENCOUNTER — LAB (OUTPATIENT)
Dept: LAB | Facility: CLINIC | Age: 59
End: 2024-07-22
Payer: COMMERCIAL

## 2024-07-22 DIAGNOSIS — R06.7 SNEEZING: ICD-10-CM

## 2024-07-22 PROCEDURE — 86003 ALLG SPEC IGE CRUDE XTRC EA: CPT

## 2024-07-22 PROCEDURE — 36415 COLL VENOUS BLD VENIPUNCTURE: CPT

## 2024-07-24 LAB
D FARINAE IGE QN: <0.1 KU(A)/L
D PTERONYSS IGE QN: <0.1 KU(A)/L
DOG DANDER+EPITH IGE QN: <0.1 KU(A)/L

## 2024-07-30 ENCOUNTER — MYC MEDICAL ADVICE (OUTPATIENT)
Dept: FAMILY MEDICINE | Facility: CLINIC | Age: 59
End: 2024-07-30

## 2024-07-30 ENCOUNTER — PATIENT OUTREACH (OUTPATIENT)
Dept: FAMILY MEDICINE | Facility: CLINIC | Age: 59
End: 2024-07-30

## 2024-07-30 NOTE — TELEPHONE ENCOUNTER
Patient Quality Outreach    Patient is due for the following:   Asthma  -  ACT needed  Physical Preventive Adult Physical    Next Steps:   Schedule a Adult Preventative    Type of outreach:    Sent [employer] message.      Questions for provider review:    None           Thelma Pyle, CMA

## 2024-08-07 ENCOUNTER — OFFICE VISIT (OUTPATIENT)
Dept: PULMONOLOGY | Facility: CLINIC | Age: 59
End: 2024-08-07
Attending: INTERNAL MEDICINE
Payer: COMMERCIAL

## 2024-08-07 DIAGNOSIS — J45.20 MILD INTERMITTENT ASTHMA WITHOUT COMPLICATION: ICD-10-CM

## 2024-08-07 LAB — PULMONARY FUNCTION TEST-FENO: 11.5 PPB (ref 0–40)

## 2024-08-07 PROCEDURE — 95012 NITRIC OXIDE EXP GAS DETER: CPT | Performed by: INTERNAL MEDICINE

## 2024-08-07 PROCEDURE — 94375 RESPIRATORY FLOW VOLUME LOOP: CPT | Performed by: INTERNAL MEDICINE

## 2024-08-07 NOTE — LETTER
8/7/2024      Saritha Pearson  [address]  [facility] [address]      Dear Colleague,    Thank you for referring your patient, Saritha Pearson, to the [facility] SPECIALTY CLINIC [place]. Please see a copy of my visit note below.    Saritha Pearson comes into clinic today at the request of Dr. Balbuena, Ordering Provider for spirometry  and FENO      This service provided today was under the supervising provider of the day Dr. Patricia, who was available if needed.    Augustine La, RT       Again, thank you for allowing me to participate in the care of your patient.        Sincerely,        [facility]

## 2024-08-07 NOTE — PROGRESS NOTES
Saritha Michel comes into clinic today at the request of Dr. Balbuena, Ordering Provider for spirometry  and FENO      This service provided today was under the supervising provider of the day Dr. Patricia, who was available if needed.    Augustine La, RT

## 2024-08-12 LAB
EXPTIME-PRE: 5.41 SEC
FEF2575-%PRED-PRE: 113 %
FEF2575-PRE: 2.68 L/SEC
FEF2575-PRED: 2.36 L/SEC
FEFMAX-%PRED-PRE: 95 %
FEFMAX-PRE: 6.5 L/SEC
FEFMAX-PRED: 6.79 L/SEC
FEV1-%PRED-PRE: 85 %
FEV1-PRE: 2.21 L
FEV1FEV6-PRE: 85 %
FEV1FEV6-PRED: 81 %
FEV1FVC-PRE: 84 %
FEV1FVC-PRED: 80 %
FIFMAX-PRE: 4.4 L/SEC
FVC-%PRED-PRE: 80 %
FVC-PRE: 2.61 L
FVC-PRED: 3.24 L

## 2024-08-14 ENCOUNTER — TRANSFERRED RECORDS (OUTPATIENT)
Dept: HEALTH INFORMATION MANAGEMENT | Facility: CLINIC | Age: 59
End: 2024-08-14
Payer: COMMERCIAL

## 2024-08-21 ENCOUNTER — HOSPITAL ENCOUNTER (EMERGENCY)
Facility: CLINIC | Age: 59
End: 2024-08-21
Payer: COMMERCIAL

## 2024-09-03 ENCOUNTER — TELEPHONE (OUTPATIENT)
Dept: FAMILY MEDICINE | Facility: CLINIC | Age: 59
End: 2024-09-03
Payer: COMMERCIAL

## 2024-09-03 DIAGNOSIS — E66.811 CLASS 1 OBESITY IN ADULT, UNSPECIFIED BMI, UNSPECIFIED OBESITY TYPE, UNSPECIFIED WHETHER SERIOUS COMORBIDITY PRESENT: ICD-10-CM

## 2024-09-03 DIAGNOSIS — R73.03 PREDIABETES: ICD-10-CM

## 2024-09-03 DIAGNOSIS — Z83.3 FHX: TYPE 2 DIABETES MELLITUS: ICD-10-CM

## 2024-09-03 RX ORDER — SEMAGLUTIDE 1 MG/.5ML
INJECTION, SOLUTION SUBCUTANEOUS
Qty: 2 ML | Refills: 1 | Status: SHIPPED | OUTPATIENT
Start: 2024-09-03

## 2024-09-05 NOTE — TELEPHONE ENCOUNTER
Central Prior Authorization Team   Phone: [phone]    PA Initiation    Medication: Can you obtain a PA for Wegovy 1mg/0.5 mL SOAJ?  Thank you!  Insurance Company: [employer] - Phone [phone] Fax [phone]  Pharmacy Filling the Rx: [facility] [place] [place] - [address] [place] [address]  Filling Pharmacy Phone: [phone]  Filling Pharmacy Fax:    Start Date: 9/5/2024

## 2024-09-08 ENCOUNTER — E-VISIT (OUTPATIENT)
Dept: URGENT CARE | Facility: CLINIC | Age: 59
End: 2024-09-08
Payer: COMMERCIAL

## 2024-09-08 DIAGNOSIS — B96.89 ACUTE BACTERIAL SINUSITIS: Primary | ICD-10-CM

## 2024-09-08 DIAGNOSIS — J01.90 ACUTE BACTERIAL SINUSITIS: Primary | ICD-10-CM

## 2024-09-08 PROCEDURE — 99207 PR NON-BILLABLE SERV PER CHARTING: CPT | Performed by: PHYSICIAN ASSISTANT

## 2024-09-09 NOTE — PATIENT INSTRUCTIONS
Acute Sinusitis: Care Instructions  Overview     Acute sinusitis is an inflammation of the mucous membranes inside the nose and sinuses. Sinuses are the hollow spaces in your skull around the eyes and nose. Acute sinusitis often follows a cold. Acute sinusitis causes thick, discolored mucus that drains from the nose or down the back of the throat. It also can cause pain and pressure in your head and face along with a stuffy or blocked nose.  In most cases, sinusitis gets better on its own in 1 to 2 weeks. But some mild symptoms may last for several weeks. Sometimes antibiotics are needed if there is a bacterial infection.  Follow-up care is a key part of your treatment and safety. Be sure to make and go to all appointments, and call your doctor if you are having problems. It's also a good idea to know your test results and keep a list of the medicines you take.  How can you care for yourself at home?  Use saline (saltwater) nasal washes. This can help keep your nasal passages open and wash out mucus and allergens.  You can buy saline nose washes at a grocery store or drugstore. Follow the instructions on the package.  You can make your own at home. Add 1 teaspoon of non-iodized salt and 1 teaspoon of baking soda to 2 cups of distilled or boiled and cooled water. Fill a squeeze bottle or a nasal cleansing pot (such as a neti pot) with the nasal wash. Then put the tip into your nostril, and lean over the sink. With your mouth open, gently squirt the liquid. Repeat on the other side.  Try a decongestant nasal spray like oxymetazoline (Afrin). Do not use it for more than 3 days in a row. Using it for more than 3 days can make your congestion worse.  If needed, take an over-the-counter pain medicine, such as acetaminophen (Tylenol), ibuprofen (Advil, Motrin), or naproxen (Aleve). Read and follow all instructions on the label.  If the doctor prescribed antibiotics, take them as directed. Do not stop taking them just  "because you feel better. You need to take the full course of antibiotics.  Be careful when taking over-the-counter cold or flu medicines and Tylenol at the same time. Many of these medicines have acetaminophen, which is Tylenol. Read the labels to make sure that you are not taking more than the recommended dose. Too much acetaminophen (Tylenol) can be harmful.  Try a steroid nasal spray. It may help with your symptoms.  Breathe warm, moist air. You can use a steamy shower, a hot bath, or a sink filled with hot water. Avoid cold, dry air. Using a humidifier in your home may help. Follow the directions for cleaning the machine.  When should you call for help?   Call your doctor now or seek immediate medical care if:    You have new or worse swelling, redness, or pain in your face or around one or both of your eyes.     You have double vision or a change in your vision.     You have a high fever.     You have a severe headache and a stiff neck.     You have mental changes, such as feeling confused or much less alert.   Watch closely for changes in your health, and be sure to contact your doctor if:    You are not getting better as expected.   Where can you learn more?  Go to https://www.Clipyoo.net/patiented  Enter I933 in the search box to learn more about \"Acute Sinusitis: Care Instructions.\"  Current as of: September 27, 2023               Content Version: 14.0    6574-5130 [email or web address].   Care instructions adapted under license by your healthcare professional. If you have questions about a medical condition or this instruction, always ask your healthcare professional. [email or web address] disclaims any warranty or liability for your use of this information.      You may want to try a nasal lavage (also known as nasal irrigation). You can find over-the-counter products, such as Neti-Pot, at retail locations or make your own at home. Instructions for homemade nasal lavage and more information on " the process are available online at http://www.aafp.org/afp/2009/1115/p1121.html.    Thank you for choosing us for your care. I have placed an order for a prescription so that you can start treatment. View your full visit summary for details by clicking on the link below. Your pharmacist will able to address any questions you may have about the medication.     If you're not feeling better within 5-7 days, please schedule an appointment.  You can schedule an appointment right here in [facility], or call [phone]  If the visit is for the same symptoms as your eVisit, we'll refund the cost of your eVisit if seen within seven days.

## 2024-09-09 NOTE — TELEPHONE ENCOUNTER
PRIOR AUTHORIZATION DENIED    Medication: Can you obtain a PA for Wegovy 1mg/0.5 mL SOAJ?  Thank you!-PA DENIED     Denial Date: 9/5/2024    Denial Rational:         Appeal Information:

## 2024-09-11 ENCOUNTER — MYC MEDICAL ADVICE (OUTPATIENT)
Dept: FAMILY MEDICINE | Facility: CLINIC | Age: 59
End: 2024-09-11
Payer: COMMERCIAL

## 2024-09-11 DIAGNOSIS — E66.811 CLASS 1 OBESITY IN ADULT, UNSPECIFIED BMI, UNSPECIFIED OBESITY TYPE, UNSPECIFIED WHETHER SERIOUS COMORBIDITY PRESENT: ICD-10-CM

## 2024-09-11 DIAGNOSIS — R73.03 PREDIABETES: ICD-10-CM

## 2024-09-11 DIAGNOSIS — Z83.3 FHX: TYPE 2 DIABETES MELLITUS: ICD-10-CM

## 2024-09-11 NOTE — TELEPHONE ENCOUNTER
Called patient per request on [employer] message. Discussed with patient the dose of Wegovy currently on. Patient did not know current dose. After calling pharmacy, patient has been taking the 1 mg of Wegovy. Prior auth denied for the 1 mg.    Provider: do you want to increase the dose of the Wegovy to 1.7 mg to see if insurance will cover? Patient has been tolerating the 1 mg, but feels she could go up on dose.    Audra Barron RN

## 2024-09-11 NOTE — TELEPHONE ENCOUNTER
See other [employer] message from today, 09/11/2024. Called patient per request.    Audra Barron RN

## 2024-09-13 NOTE — TELEPHONE ENCOUNTER
Patient states she talked to nurse previously and calling back to check on status of PA for Wegovy prescription. Patient states she has been using Wegovy for 6 months and has been working well for her. Patient reports she ran out of medication last week. Patient wants to continue medication but needs provider to approve PA to get more medication and interested in increasing to next dosage.     Writer noted PA denied for Wegovy 1 mg on 9/9/24.      Routing to provider to review and advise on next steps and dosage? Patient's preferred pharmacy is [facility].     Patient is requesting update ASAP.     JAIDEN Ortega  [facility]

## 2024-09-13 NOTE — TELEPHONE ENCOUNTER
Patient calling to follow up on prescription Wegovy. Patient does want to go up in dose. Please see nurse note below and advise.     Patient is out of prescription and needs addressed as soon as possible.       Paras Chakraborty RN, BSN

## 2024-09-16 ENCOUNTER — TELEPHONE (OUTPATIENT)
Dept: FAMILY MEDICINE | Facility: CLINIC | Age: 59
End: 2024-09-16
Payer: COMMERCIAL

## 2024-09-16 NOTE — TELEPHONE ENCOUNTER
Patient called regarding prior auth for wegovy. Chart review shows prior auth denied. Patient states she really wants to continue on this medication. She feels as though it is really helping with all her symptoms. Patient feels as though she has lost some weight.     Routing to provider to review/advise.       Maria Escamilla RN  [facility]

## 2024-09-16 NOTE — TELEPHONE ENCOUNTER
Ok for PA but I don't have control over the whole insurance process if not covered with the diagnoses that were placed at ordering.  Aristides Rogers MD

## 2024-09-16 NOTE — TELEPHONE ENCOUNTER
PRIOR AUTHORIZATION DENIED    Medication: WEGOVY 1.7 MG/0.75ML SC SOAJ  Insurance Company: [employer] - Phone [phone] Fax [phone]  Denial Date: 9/16/2024  Denial Reason(s): Patient has not lost and maintained 5% weight loss      Appeal Information:   Patient Notified: No

## 2024-09-16 NOTE — LETTER
2024    INSURER: Payor: FIFI / Plan: FIFI PMAP / Product Type: HMO /   ATTN: ***  Re: Prior Authorization Request  Patient: Saritha Pearson  Policy ID#:  [id number]  : 1965      To Whom it May Concern:    I am writing to formally request a prior authorization of coverage for my patient,  Saritha Pearson, for treatment using [LIST SPECIFIC THERAPY].  I am requesting authorization for applicable provider professional and facility services associated with this therapy.    The therapy involves [Brief description of recommended therapy and time length of anticipated therapy].      The benefits of the therapy include [reference standard of care or clinical trial supportive evidence and results].      I have treated Saritha Pearson since [DATE] and I have determined that it is medically appropriate for  this patient to receive be treated with  [ LIST SPECIFIC THERAPY ]  for the reason(s) stated below:    [Describe patient s medical condition with exact diagnosis and symptoms associated with the disease]    [List failed drug treatments and side effects and other therapies tried and failed. Add Additional Clinical Detail, for example, note why/if patient is not a suitable candidate for other therapy. Be specific and provide details ]    [Discuss why the procedure is the most appropriate treatment for the patient s condition ]    [Discuss implications if patient does not get the therapy, eg outcomes, quality of life)]    I have included medical records pertaining to the patient s medical history, current condition and treatment plan.  In addition, the following billing codes will be used for therapy and follow-up: [INSERT ICD-10 DIAGNOSIS and CPT CODES].      Attached are [LIST ANY APPLICABLE ATTACHMENTS EG, ARTICLES, INSERTS, PROTOCOLS etc. ] for your reference.    I firmly believe that this therapy is clinically appropriate and that Saritha Pearson would benefit from improved [clinical outcomes, quality of life] if  allowed the opportunity to receive this treatment.  Please contact me at Dept: [phone] if you require additional information to ensure the prompt approval for coverage.    Please send your written decision to me at this address:  [facility]  [id number] [place] [address] [phone]  Dept: [phone]  E-mail: ***      Sincerely,      Aristides Rogers MD        Enclosures

## 2024-09-16 NOTE — LETTER
2024    INSURER: Payor: FIFI / Plan: FIFI PMAP / Product Type: HMO /   Re: Prior Authorization Request  Patient: Saritha Pearson  Policy ID#:  [id number]  : 1965      To Whom it May Concern:    I am writing to formally request a prior authorization of coverage for my patient,  Saritha Pearson, for treatment pre-diabetes and obesity using wegovy weekly injections.  I am requesting authorization for applicable provider professional and facility services associated with this therapy.  Wegovy has helped lower patient's blood sugars and lose over 5% of body weight. It has also helped with constipation.       I firmly believe that this therapy is clinically appropriate and that Saritha Pearson would benefit from improved [clinical outcomes, quality of life] if allowed the opportunity to receive this treatment.  Please contact me at Dept: [phone] if you require additional information to ensure the prompt approval for coverage.    Please send your written decision to me at this address:  [facility]  [address] [facility] [address] [phone]  Dept: [phone]        Sincerely,      Aristides Rogers MD        Enclosures

## 2024-09-17 ENCOUNTER — OFFICE VISIT (OUTPATIENT)
Dept: ALLERGY | Facility: CLINIC | Age: 59
End: 2024-09-17
Payer: COMMERCIAL

## 2024-09-17 VITALS
SYSTOLIC BLOOD PRESSURE: 120 MMHG | DIASTOLIC BLOOD PRESSURE: 86 MMHG | HEART RATE: 91 BPM | OXYGEN SATURATION: 99 % | RESPIRATION RATE: 16 BRPM | BODY MASS INDEX: 32.16 KG/M2 | WEIGHT: 202.3 LBS

## 2024-09-17 DIAGNOSIS — R06.7 SNEEZING: ICD-10-CM

## 2024-09-17 DIAGNOSIS — J40 BRONCHITIS: Primary | ICD-10-CM

## 2024-09-17 DIAGNOSIS — J45.20 MILD INTERMITTENT ASTHMA WITHOUT COMPLICATION: ICD-10-CM

## 2024-09-17 DIAGNOSIS — J30.1 SEASONAL ALLERGIC RHINITIS DUE TO POLLEN: ICD-10-CM

## 2024-09-17 PROCEDURE — 99214 OFFICE O/P EST MOD 30 MIN: CPT | Performed by: INTERNAL MEDICINE

## 2024-09-17 RX ORDER — AZITHROMYCIN 250 MG/1
TABLET, FILM COATED ORAL
Qty: 6 TABLET | Refills: 0 | Status: SHIPPED | OUTPATIENT
Start: 2024-09-17 | End: 2024-09-22

## 2024-09-17 RX ORDER — FEXOFENADINE HCL 180 MG/1
180 TABLET ORAL DAILY
Qty: 30 TABLET | Refills: 3 | Status: SHIPPED | OUTPATIENT
Start: 2024-09-17

## 2024-09-17 ASSESSMENT — ASTHMA QUESTIONNAIRES
QUESTION_5 LAST FOUR WEEKS HOW WOULD YOU RATE YOUR ASTHMA CONTROL: NOT CONTROLLED AT ALL
QUESTION_4 LAST FOUR WEEKS HOW OFTEN HAVE YOU USED YOUR RESCUE INHALER OR NEBULIZER MEDICATION (SUCH AS ALBUTEROL): THREE OR MORE TIMES PER DAY
QUESTION_3 LAST FOUR WEEKS HOW OFTEN DID YOUR ASTHMA SYMPTOMS (WHEEZING, COUGHING, SHORTNESS OF BREATH, CHEST TIGHTNESS OR PAIN) WAKE YOU UP AT NIGHT OR EARLIER THAN USUAL IN THE MORNING: FOUR OR MORE NIGHTS A WEEK
QUESTION_1 LAST FOUR WEEKS HOW MUCH OF THE TIME DID YOUR ASTHMA KEEP YOU FROM GETTING AS MUCH DONE AT WORK, SCHOOL OR AT HOME: ALL OF THE TIME
QUESTION_2 LAST FOUR WEEKS HOW OFTEN HAVE YOU HAD SHORTNESS OF BREATH: MORE THAN ONCE A DAY
ACT_TOTALSCORE: 5
ACT_TOTALSCORE: 5

## 2024-09-17 NOTE — TELEPHONE ENCOUNTER
Left message on patient's voicemail to call back.Stephany Austin [facility]

## 2024-09-17 NOTE — LETTER
9/17/2024      Saritha Pearson  [address]  [facility] [address]      Dear Colleague,    Thank you for referring your patient, Saritha Pearson, to the [facility] [place]. Please see a copy of my visit note below.    Saritha Pearson was seen in the Allergy Clinic at [facility].    Saritha Pearson is a 59 year old female being seen today for ongoing evaluation of mild intermittent asthma with a productive cough for 2 to 3 weeks.  She was recently treated with Augmentin on September 8 for 7 days without improvement.  She does have albuterol that she does use intermittently when sick.    Recent breathing test were normal.  As October 7 she had an exhaled nitric oxide of 15 with an FEV1 of 85% FVC of 80%.    Skin testing at the last appointment was positive to ragweed only.  Recommended to stop chlorpheniramine and switch to fexofenadine which she did not do.  She has a lot of confusion when she takes a lot of the chlorpheniramine.    Blood blood testing was negative to dust mite and dog.      Past Medical History:   Diagnosis Date     Asthma     asthma sx after resp infections     Constipation      Gastro-oesophageal reflux disease      PONV (postoperative nausea and vomiting)      Vaginal high risk HPV DNA test positive 2016, 2018, 2019    see problem list     Family History   Problem Relation Age of Onset     Cerebrovascular Disease Father 60     Cerebrovascular Disease Maternal Grandmother      Cancer - colorectal Maternal Grandfather 50     Cancer Paternal Grandmother      Cancer Paternal Grandfather      Cancer - colorectal Sister 40     Breast Cancer Paternal Aunt      Breast Cancer Paternal Aunt      Breast Cancer Paternal Aunt      Breast Cancer Paternal Aunt      Breast Cancer Paternal Aunt      Breast Cancer Other      Past Surgical History:   Procedure Laterality Date     ABDOMEN SURGERY      abdominal wall mass     ABDOMINOPLASTY      pt has post op infection      COSMETIC SURGERY       EXCISE LESION TRUNK  10/4/2011    Procedure:EXCISE LESION TRUNK; Excision Abdominal Wall Mass ; Surgeon:CARLITO BETTS; Location:[facility] OR     GYN SURGERY       HYSTERECTOMY       HYSTERECTOMY, PAP NO LONGER INDICATED  11/02/2018         Current Outpatient Medications:      albuterol (PROAIR HFA/PROVENTIL HFA/VENTOLIN HFA) 108 (90 Base) MCG/ACT inhaler, Inhale 2 puffs into the lungs every 4 hours as needed for shortness of breath or wheezing, Disp: 18 g, Rfl: 0     azelastine (ASTEPRO) 0.15 % nasal spray, Spray 2 sprays into both nostrils daily, Disp: 30 mL, Rfl: 5     azithromycin (ZITHROMAX) 250 MG tablet, Take 2 tablets (500 mg) by mouth daily for 1 day, THEN 1 tablet (250 mg) daily for 4 days., Disp: 6 tablet, Rfl: 0     diclofenac (VOLTAREN) 1 % topical gel, Apply 4 g topically 4 times daily (Patient taking differently: Apply 4 g topically as needed.), Disp: 150 g, Rfl: 0     fexofenadine (ALLEGRA) 180 MG tablet, Take 1 tablet (180 mg) by mouth daily., Disp: 30 tablet, Rfl: 3     latanoprost (XALATAN) 0.005 % ophthalmic solution, , Disp: , Rfl:      omeprazole (PRILOSEC) 20 MG DR capsule, take 1 capsule by oral route 2 times every day 30 minutes to 1 hour before first meal and 12 hours later, Disp: , Rfl:      polyethylene glycol (MIRALAX) 17 GM/Dose powder, TAKE 3 CAPFULS BY MOUTH 3 times DAILY for constipation, Disp: , Rfl:      RESTASIS 0.05 % ophthalmic emulsion, , Disp: , Rfl:      RETIN-A 0.1 % external cream, , Disp: , Rfl:      Semaglutide-Weight Management (WEGOVY) 0.5 MG/0.5ML pen, Inject 0.5 mg Subcutaneous once a week This is double dosage, Disp: 2 mL, Rfl: 1     timolol maleate (TIMOPTIC) 0.5 % ophthalmic solution, , Disp: , Rfl:      amphetamine-dextroamphetamine (ADDERALL) 10 MG tablet, Take 10 mg by mouth (Patient not taking: Reported on 9/17/2024), Disp: , Rfl:      estradiol (ESTRACE) 0.5 MG tablet, Take 1 tablet (0.5 mg) by mouth daily (Patient not taking: Reported  on 9/17/2024), Disp: 90 tablet, Rfl: 3     semaglutide (OZEMPIC) 2 MG/3ML pen, Inject 0.25 mg Subcutaneous every 7 days, Disp: 3 mL, Rfl: 1     Semaglutide-Weight Management (WEGOVY) 0.25 MG/0.5ML pen, Inject 0.25 mg Subcutaneous once a week, Disp: 2 mL, Rfl: 1     Semaglutide-Weight Management (WEGOVY) 0.25 MG/0.5ML pen, Inject 0.25 mg Subcutaneous once a week, Disp: 2 mL, Rfl: 1     Semaglutide-Weight Management (WEGOVY) 1.7 MG/0.75ML pen, Inject 1.7 mg subcutaneously once a week. This is higher dosage, Disp: 3 mL, Rfl: 1     WEGOVY 1 MG/0.5ML pen, INJECT 1 MG SUBCUTANEOUSLY ONCE A WEEK, Disp: 2 mL, Rfl: 1  Allergies   Allergen Reactions     Sulfa Antibiotics Hives, Itching and Rash         EXAM:   /86 (BP Location: Left arm, Patient Position: Sitting, Cuff Size: Adult Large)   Pulse 91   Resp 16   Wt 91.8 kg (202 lb 4.8 oz)   SpO2 99%   BMI 32.16 kg/m      Constitutional:       General: She is not in acute distress.     Appearance: Normal appearance. She is not ill-appearing.   HENT:      Head: Normocephalic and atraumatic.      Nose: Nose normal. No congestion or rhinorrhea.      Mouth/Throat:      Mouth: Mucous membranes are moist.      Pharynx: Oropharynx is clear. No posterior oropharyngeal erythema.   Eyes:      General:         Right eye: No discharge.         Left eye: No discharge.   Cardiovascular:      Rate and Rhythm: Normal rate and regular rhythm.      Heart sounds: Normal heart sounds.   Pulmonary:      Effort: Pulmonary effort is normal.      Breath sounds: Normal breath sounds. No wheezing or rhonchi.   Skin:     General: Skin is warm.      Findings: No erythema or rash.   Neurological:      General: No focal deficit present.      Mental Status: She is alert. Mental status is at baseline.   Psychiatric:         Mood and Affect: Mood normal.         Behavior: Behavior normal.        ASSESSMENT/PLAN:  Saritha Pearson is a 59 year old female seen today for ragweed allergy, and bronchitis  with productive cough for 3 weeks not responding to Augmentin.  She would like to be treated with azithromycin which seems reasonable.  Spirometry and exhaled nitric oxide was normal.  She has albuterol to use as needed for asthma symptoms.    Azithromycin was prescribed.    Follow-up as needed      Thank you for allowing me to participate in the care of Saritha Pearson.      I spent 38 minutes on the date of the encounter doing chart review, history and exam, documentation and further coordination as noted above exclusive of separately reported interpretations    Lawrence Balbuena MD  Allergy/Immunology  [facility]      Again, thank you for allowing me to participate in the care of your patient.        Sincerely,        Lawrence Balbuena MD

## 2024-09-17 NOTE — TELEPHONE ENCOUNTER
Patient called back to clinic.   Writer reviewed and discussed PA denial and noted reason for this.  Patient feels the records or information given to insurance is not accurate. Patient is confident that she has last more than 5% of body weight since starting medication.  Notes that in February 2024, she actually weighed about 240#.  **Documentation from that OV notes 211#. OV notes from Orthopedics on 11/20/23 was 214#.  Pt says she was not formally weighed on a scale that day, was asked what her weight was but she was wrong.  Says she has been weighing herself at home.  Current weight today (writer had pt step on scale while on phone) and weight was 205.4#    Patient also noted that her chronic constipation has also improved. Previous to starting Wegovy, was taking large amounts of Miralax and docusate on daily basis. With taking Wegovy, has gotten off all stool/constipation pills and therapies.    Last Wegovy was taken about 3 weeks ago. Notes her chronic constipation is returning now too.  Feels she has had more significant weight loss than what has been recorded.   Pt feelt Wegovy has been working very well for her, tolerating well, is seeing weight loss and improvements in her chronic constipation and would like to continue this medication so whatever can be done to override PA denial, she would like this done.      How would you like to proceed? Can a PA be submitted with this new weight loss information? (See bold above).    Or do we now need to proceed with an appeal request?          Alida Peguero RN  Clinical Triage/Primary Care  [facility]

## 2024-09-17 NOTE — PATIENT INSTRUCTIONS
Allergy Staff Appt Hours Shot Hours Location       Physician   Lawrence Balbuena MD      Support Staff   JAIDEN Padron RN, MA Emily J., MA      Mondays Tuesdays Thursdays and Fridays:      [facility] 7-5      Wednesdays         Close                Mondays, Tuesdays and Fridays:  7:20 - 3:40              [facility]  [address]  [place] [address]  Allergy appointment  line: [phone]    Pulmonary Function Scheduling:  [place]: [phone]           Questions about cost of your care  For questions about your cost of your visit, procedure, lab or imaging contact: [employer] Line [phone] or visit:  [email or web address]    Prescription Assistance  If you need assistance with your prescriptions (cost, coverage, etc) please contact: [employer] Prescription Assistance Program [phone]    Important Scheduling Information  All visits for food challenges, medication/drug allergy testing, and drug challenges MUST be scheduled through the allergy clinic nurse. Please contact them via [employer] or by calling the clinic at [phone] and asking to speak with an allergy nurse. They will provide additional information and instructions for the appointment. Discontinue oral antihistamines 7 days prior to the appointment. Discontinue nasal and ocular antihistamines 1 day prior to the appointment.    Appointments for skin testing: Appointment will last approximately 45 minutes.  Please call the appointment line for your clinic to schedule.  Discontinue oral antihistamines 7 days prior to the appointment.  Discontinue nasal and ocular antihistamines 1 days prior to appointment.    Thank you for trusting us with your care. Please feel free to contact us with any questions or concerns you may have.

## 2024-09-17 NOTE — PROGRESS NOTES
Saritha Pearson was seen in the Allergy Clinic at [facility].    Saritha Pearson is a 59 year old female being seen today for ongoing evaluation of mild intermittent asthma with a productive cough for 2 to 3 weeks.  She was recently treated with Augmentin on September 8 for 7 days without improvement.  She does have albuterol that she does use intermittently when sick.    Recent breathing test were normal.  As October 7 she had an exhaled nitric oxide of 15 with an FEV1 of 85% FVC of 80%.    Skin testing at the last appointment was positive to ragweed only.  Recommended to stop chlorpheniramine and switch to fexofenadine which she did not do.  She has a lot of confusion when she takes a lot of the chlorpheniramine.    Blood blood testing was negative to dust mite and dog.      Past Medical History:   Diagnosis Date    Asthma     asthma sx after resp infections    Constipation     Gastro-oesophageal reflux disease     PONV (postoperative nausea and vomiting)     Vaginal high risk HPV DNA test positive 2016, 2018, 2019    see problem list     Family History   Problem Relation Age of Onset    Cerebrovascular Disease Father 60    Cerebrovascular Disease Maternal Grandmother     Cancer - colorectal Maternal Grandfather 50    Cancer Paternal Grandmother     Cancer Paternal Grandfather     Cancer - colorectal Sister 40    Breast Cancer Paternal Aunt     Breast Cancer Paternal Aunt     Breast Cancer Paternal Aunt     Breast Cancer Paternal Aunt     Breast Cancer Paternal Aunt     Breast Cancer Other      Past Surgical History:   Procedure Laterality Date    ABDOMEN SURGERY      abdominal wall mass    ABDOMINOPLASTY      pt has post op infection    COSMETIC SURGERY      EXCISE LESION TRUNK  10/4/2011    Procedure:EXCISE LESION TRUNK; Excision Abdominal Wall Mass ; Surgeon:CARLITO BETTS; Location:[facility] OR    GYN SURGERY      HYSTERECTOMY      HYSTERECTOMY, PAP NO LONGER INDICATED  11/02/2018          Current Outpatient Medications:     albuterol (PROAIR HFA/PROVENTIL HFA/VENTOLIN HFA) 108 (90 Base) MCG/ACT inhaler, Inhale 2 puffs into the lungs every 4 hours as needed for shortness of breath or wheezing, Disp: 18 g, Rfl: 0    azelastine (ASTEPRO) 0.15 % nasal spray, Spray 2 sprays into both nostrils daily, Disp: 30 mL, Rfl: 5    azithromycin (ZITHROMAX) 250 MG tablet, Take 2 tablets (500 mg) by mouth daily for 1 day, THEN 1 tablet (250 mg) daily for 4 days., Disp: 6 tablet, Rfl: 0    diclofenac (VOLTAREN) 1 % topical gel, Apply 4 g topically 4 times daily (Patient taking differently: Apply 4 g topically as needed.), Disp: 150 g, Rfl: 0    fexofenadine (ALLEGRA) 180 MG tablet, Take 1 tablet (180 mg) by mouth daily., Disp: 30 tablet, Rfl: 3    latanoprost (XALATAN) 0.005 % ophthalmic solution, , Disp: , Rfl:     omeprazole (PRILOSEC) 20 MG DR capsule, take 1 capsule by oral route 2 times every day 30 minutes to 1 hour before first meal and 12 hours later, Disp: , Rfl:     polyethylene glycol (MIRALAX) 17 GM/Dose powder, TAKE 3 CAPFULS BY MOUTH 3 times DAILY for constipation, Disp: , Rfl:     RESTASIS 0.05 % ophthalmic emulsion, , Disp: , Rfl:     RETIN-A 0.1 % external cream, , Disp: , Rfl:     Semaglutide-Weight Management (WEGOVY) 0.5 MG/0.5ML pen, Inject 0.5 mg Subcutaneous once a week This is double dosage, Disp: 2 mL, Rfl: 1    timolol maleate (TIMOPTIC) 0.5 % ophthalmic solution, , Disp: , Rfl:     amphetamine-dextroamphetamine (ADDERALL) 10 MG tablet, Take 10 mg by mouth (Patient not taking: Reported on 9/17/2024), Disp: , Rfl:     estradiol (ESTRACE) 0.5 MG tablet, Take 1 tablet (0.5 mg) by mouth daily (Patient not taking: Reported on 9/17/2024), Disp: 90 tablet, Rfl: 3    semaglutide (OZEMPIC) 2 MG/3ML pen, Inject 0.25 mg Subcutaneous every 7 days, Disp: 3 mL, Rfl: 1    Semaglutide-Weight Management (WEGOVY) 0.25 MG/0.5ML pen, Inject 0.25 mg Subcutaneous once a week, Disp: 2 mL, Rfl: 1     Semaglutide-Weight Management (WEGOVY) 0.25 MG/0.5ML pen, Inject 0.25 mg Subcutaneous once a week, Disp: 2 mL, Rfl: 1    Semaglutide-Weight Management (WEGOVY) 1.7 MG/0.75ML pen, Inject 1.7 mg subcutaneously once a week. This is higher dosage, Disp: 3 mL, Rfl: 1    WEGOVY 1 MG/0.5ML pen, INJECT 1 MG SUBCUTANEOUSLY ONCE A WEEK, Disp: 2 mL, Rfl: 1  Allergies   Allergen Reactions    Sulfa Antibiotics Hives, Itching and Rash         EXAM:   /86 (BP Location: Left arm, Patient Position: Sitting, Cuff Size: Adult Large)   Pulse 91   Resp 16   Wt 91.8 kg (202 lb 4.8 oz)   SpO2 99%   BMI 32.16 kg/m      Constitutional:       General: She is not in acute distress.     Appearance: Normal appearance. She is not ill-appearing.   HENT:      Head: Normocephalic and atraumatic.      Nose: Nose normal. No congestion or rhinorrhea.      Mouth/Throat:      Mouth: Mucous membranes are moist.      Pharynx: Oropharynx is clear. No posterior oropharyngeal erythema.   Eyes:      General:         Right eye: No discharge.         Left eye: No discharge.   Cardiovascular:      Rate and Rhythm: Normal rate and regular rhythm.      Heart sounds: Normal heart sounds.   Pulmonary:      Effort: Pulmonary effort is normal.      Breath sounds: Normal breath sounds. No wheezing or rhonchi.   Skin:     General: Skin is warm.      Findings: No erythema or rash.   Neurological:      General: No focal deficit present.      Mental Status: She is alert. Mental status is at baseline.   Psychiatric:         Mood and Affect: Mood normal.         Behavior: Behavior normal.        ASSESSMENT/PLAN:  Saritha Pearson is a 59 year old female seen today for ragweed allergy, and bronchitis with productive cough for 3 weeks not responding to Augmentin.  She would like to be treated with azithromycin which seems reasonable.  Spirometry and exhaled nitric oxide was normal.  She has albuterol to use as needed for asthma symptoms.    Azithromycin was  prescribed.    Follow-up as needed      Thank you for allowing me to participate in the care of Saritha Pearson.      I spent 38 minutes on the date of the encounter doing chart review, history and exam, documentation and further coordination as noted above exclusive of separately reported interpretations    Lawrence Balbuena MD  Allergy/Immunology  [facility]

## 2024-09-18 NOTE — TELEPHONE ENCOUNTER
Ok PA noting that helps with chronic constipation and patient has had a 5% weight loss. Still don't know if will be covered. Aristides Rogers MD

## 2024-09-19 NOTE — TELEPHONE ENCOUNTER
[facility] Prior Authorization Team   Phone: [phone]      In order to send an appeal, I would need a letter of medical necessity.  Once that has been completed, please route the encounter back to me directly: CK CHISHOLM and I can send the appeal.

## 2024-09-25 NOTE — TELEPHONE ENCOUNTER
Patient called to clinic to check on the status of the appeal letter for Wegovy.   See a letter was created by PCP on 9/19 and encounter was sent back to PA specialist on same day. Unable to locate any other documentation or updates on this process.    Are you able to provide an update on status of appeal letter for patient?   Thank you!          Alida Peguero RN  Clinical Triage/Primary Care  [facility]

## 2024-09-26 NOTE — TELEPHONE ENCOUNTER
Medication Appeal Initiation    We have initiated an appeal for the requested medication:  Medication: Semaglutide-Weight Management (WEGOVY) 1.7 MG/0.75ML pen - EPA DENIED - APPEAL SENT  Appeal Start Date:  9/26/2024  Insurance Company:  [employer]  Comments: APPEAL LETTER FAXED.

## 2024-09-26 NOTE — TELEPHONE ENCOUNTER
"Chiara from [facility] Appeals calling stating she needs to clarify part of the wording in the Prior Authorization Appeal letter received from pt's PCP.  She states the letter of appeal, used the word \"prompt,\" in relation to ensuring \"...the prompt approval for coverage.\"    Chiara is requesting clarification if this pt's appeal needs to be handled as an expedited appeal for any medical reason or if further delay in approval may require pt to have to restart taper dosing schedule. RN advised the per chart review pt was already on Wegovy previously, and pt did report to clinic that she had already run out of medication (see 9/13/24 documentation within the 9/11/24 MyChart encounter).     Chiara states pt's prior authorization appeal will be handled via expedited processing due to being out of medication that requires taper dosing that had already been started.    KATI GomezN, RN    "

## 2024-09-27 NOTE — TELEPHONE ENCOUNTER
MEDICATION APPEAL APPROVED    Medication: Semaglutide-Weight Management (WEGOVY) 1.7 MG/0.75ML pen - EPA DENIED - APPEAL APPROVED  Authorization Effective Date: 9/26/2024   Authorization Expiration Date: 9/26/2025   Approved Dose/Quantity:    Reference #:   [id number]  Insurance Company:  [employer]   Which Pharmacy is filling the prescription (Not needed for infusion/clinic administered):   [facility]

## 2024-10-17 ENCOUNTER — OFFICE VISIT (OUTPATIENT)
Dept: FAMILY MEDICINE | Facility: CLINIC | Age: 59
End: 2024-10-17
Payer: COMMERCIAL

## 2024-10-17 VITALS
HEART RATE: 89 BPM | TEMPERATURE: 97 F | OXYGEN SATURATION: 99 % | WEIGHT: 211 LBS | BODY MASS INDEX: 33.55 KG/M2 | DIASTOLIC BLOOD PRESSURE: 80 MMHG | RESPIRATION RATE: 20 BRPM | SYSTOLIC BLOOD PRESSURE: 122 MMHG

## 2024-10-17 DIAGNOSIS — E66.811 CLASS 1 OBESITY IN ADULT, UNSPECIFIED BMI, UNSPECIFIED OBESITY TYPE, UNSPECIFIED WHETHER SERIOUS COMORBIDITY PRESENT: ICD-10-CM

## 2024-10-17 DIAGNOSIS — R73.03 PREDIABETES: ICD-10-CM

## 2024-10-17 DIAGNOSIS — Z83.3 FHX: TYPE 2 DIABETES MELLITUS: ICD-10-CM

## 2024-10-17 DIAGNOSIS — R63.1 INCREASED THIRST: Primary | ICD-10-CM

## 2024-10-17 LAB
ALBUMIN SERPL BCG-MCNC: 4.3 G/DL (ref 3.5–5.2)
ANION GAP SERPL CALCULATED.3IONS-SCNC: 11 MMOL/L (ref 7–15)
BUN SERPL-MCNC: 11.1 MG/DL (ref 6–20)
CALCIUM SERPL-MCNC: 10.6 MG/DL (ref 8.8–10.4)
CHLORIDE SERPL-SCNC: 107 MMOL/L (ref 98–107)
CREAT SERPL-MCNC: 0.76 MG/DL (ref 0.51–0.95)
EGFRCR SERPLBLD CKD-EPI 2021: 90 ML/MIN/1.73M2
EST. AVERAGE GLUCOSE BLD GHB EST-MCNC: 120 MG/DL
GLUCOSE SERPL-MCNC: 93 MG/DL (ref 70–99)
HBA1C MFR BLD: 5.8 % (ref 0–5.6)
HCO3 SERPL-SCNC: 24 MMOL/L (ref 22–29)
PHOSPHATE SERPL-MCNC: 3.6 MG/DL (ref 2.5–4.5)
POTASSIUM SERPL-SCNC: 4.2 MMOL/L (ref 3.4–5.3)
SODIUM SERPL-SCNC: 142 MMOL/L (ref 135–145)

## 2024-10-17 PROCEDURE — 83036 HEMOGLOBIN GLYCOSYLATED A1C: CPT | Performed by: FAMILY MEDICINE

## 2024-10-17 PROCEDURE — 99214 OFFICE O/P EST MOD 30 MIN: CPT | Performed by: FAMILY MEDICINE

## 2024-10-17 PROCEDURE — 36415 COLL VENOUS BLD VENIPUNCTURE: CPT | Performed by: FAMILY MEDICINE

## 2024-10-17 PROCEDURE — 80069 RENAL FUNCTION PANEL: CPT | Performed by: FAMILY MEDICINE

## 2024-10-17 ASSESSMENT — ASTHMA QUESTIONNAIRES
QUESTION_1 LAST FOUR WEEKS HOW MUCH OF THE TIME DID YOUR ASTHMA KEEP YOU FROM GETTING AS MUCH DONE AT WORK, SCHOOL OR AT HOME: NONE OF THE TIME
QUESTION_4 LAST FOUR WEEKS HOW OFTEN HAVE YOU USED YOUR RESCUE INHALER OR NEBULIZER MEDICATION (SUCH AS ALBUTEROL): NOT AT ALL
QUESTION_3 LAST FOUR WEEKS HOW OFTEN DID YOUR ASTHMA SYMPTOMS (WHEEZING, COUGHING, SHORTNESS OF BREATH, CHEST TIGHTNESS OR PAIN) WAKE YOU UP AT NIGHT OR EARLIER THAN USUAL IN THE MORNING: NOT AT ALL
ACT_TOTALSCORE: 25
ACT_TOTALSCORE: 25
QUESTION_2 LAST FOUR WEEKS HOW OFTEN HAVE YOU HAD SHORTNESS OF BREATH: NOT AT ALL
QUESTION_5 LAST FOUR WEEKS HOW WOULD YOU RATE YOUR ASTHMA CONTROL: COMPLETELY CONTROLLED

## 2024-10-17 ASSESSMENT — PAIN SCALES - GENERAL: PAINLEVEL: NO PAIN (0)

## 2024-10-17 NOTE — PROGRESS NOTES
ASSESSMENT / PLAN:  (R63.1) Increased thirst  (primary encounter diagnosis)  Comment: chronic issues  Plan: Renal panel        Will check levels. Hydration drinks without  sugar.return to clinic if worse/new symptoms. Call/email with questions/concerns      (L05.945) Class 1 obesity in adult, unspecified BMI, unspecified obesity type, unspecified whether serious comorbidity present  Plan: Semaglutide-Weight Management (WEGOVY) 1.7         MG/0.75ML pen        Good success with wegovy.     (R73.03) Prediabetes  Plan: Renal panel, Hemoglobin A1c, Semaglutide-Weight        Management (WEGOVY) 1.7 MG/0.75ML pen        Continue high protein diet and add some weight lifting.     (Z83.3) FHx: type 2 diabetes mellitus  Plan: Semaglutide-Weight Management (WEGOVY) 1.7         MG/0.75ML pen              Giovanna Melara is a 59 year old, presenting for the following health issues:  Follow-up obesity/prediabetes. On wegovy.  No side effects. No upset stomach. Helpful with previous constipation. No colace needed. No nausea, vomiting.  No black/bloody stools.no cough.   Non-smoker. No urine changes or hematuria.   Some coffee - not a lot. Rare ALCOHOL. Water intake -not excessive.   Eye exam last month -ok. No blulrry vision.  No fatigue.   Exercise  - more active. Protein - lots of meat. Some bell balls.   Increase thrist.     No chief complaint on file.      10/17/2024    10:38 AM   Additional Questions   Roomed by Siobhan   Accompanied by Mom     History of Present Illness       Diabetes:   She presents for follow up of diabetes.    She is not checking blood glucose.         She has no concerns regarding her diabetes at this time.  She is having excessive thirst.            She eats 4 or more servings of fruits and vegetables daily.She consumes 11 or more sweetened beverage(s) daily.She exercises with enough effort to increase her heart rate 9 or less minutes per day.  She exercises with enough effort to increase her  heart rate 3 or less days per week. She is missing 2 dose(s) of medications per week.                     Objective    /80   Pulse 89   Temp 97  F (36.1  C) (Tympanic)   Resp 20   Wt 95.7 kg (211 lb)   SpO2 99%   BMI 33.55 kg/m    Body mass index is 33.55 kg/m .  Physical Exam   GENERAL: alert and no distress  EYES: Eyes grossly normal to inspection, PERRL and conjunctivae and sclerae normal  NECK: no adenopathy, no asymmetry, masses, or scars  RESP: lungs clear to auscultation - no rales, rhonchi or wheezes  CV: regular rate and rhythm, normal S1 S2, no S3 or S4, no murmur, click or rub, no peripheral edema   ABDOMEN: soft, nontender, no hepatosplenomegaly, no masses and bowel sounds normal  MS: no gross musculoskeletal defects noted, no edema  PSYCH: mentation appears normal, affect normal/bright            Signed Electronically by: Aristides Rogers MD

## 2024-10-20 ENCOUNTER — E-VISIT (OUTPATIENT)
Dept: URGENT CARE | Facility: CLINIC | Age: 59
End: 2024-10-20
Payer: COMMERCIAL

## 2024-10-20 DIAGNOSIS — J01.90 ACUTE SINUSITIS WITH SYMPTOMS > 10 DAYS: Primary | ICD-10-CM

## 2024-10-20 PROCEDURE — 99207 PR NON-BILLABLE SERV PER CHARTING: CPT | Performed by: INTERNAL MEDICINE

## 2024-10-20 NOTE — PATIENT INSTRUCTIONS
Dear Saritha Pearson    After reviewing your responses, I've been able to diagnose you with Acute sinusitis with symptoms > 10 days.      Based on your responses and diagnosis, I have prescribed   Orders Placed This Encounter   Medications     amoxicillin-clavulanate (AUGMENTIN) 875-125 MG tablet     Sig: Take 1 tablet by mouth 2 times daily for 7 days.     Dispense:  14 tablet     Refill:  0      to treat your symptoms. I have sent this to your pharmacy.?     It is also important to stay well hydrated, get lots of rest and take over-the-counter decongestants,?tylenol?or ibuprofen if you?are able to?take those medications per your primary care provider to help relieve discomfort.?     It is important that you take?all of?your prescribed medication even if your symptoms are improving after a few doses.? Taking?all of?your medicine helps prevent the symptoms from returning.?     If your symptoms worsen, you develop severe headache, vomiting, high fever (>102), or are not improving in 7 days, please contact your primary care provider for an appointment or visit any of our convenient Walk-in Care or Urgent Care Centers to be seen which can be found on our website?here.?     Thanks again for choosing?us?as your health care partner,?   ?  Ernestina Contreras MD?

## 2024-11-30 ENCOUNTER — E-VISIT (OUTPATIENT)
Dept: URGENT CARE | Facility: CLINIC | Age: 59
End: 2024-11-30
Payer: COMMERCIAL

## 2024-11-30 DIAGNOSIS — J01.90 ACUTE SINUSITIS WITH SYMPTOMS > 10 DAYS: Primary | ICD-10-CM

## 2024-11-30 NOTE — PATIENT INSTRUCTIONS
Acute Sinusitis: Care Instructions  Overview     Acute sinusitis is an inflammation of the mucous membranes inside the nose and sinuses. Sinuses are the hollow spaces in your skull around the eyes and nose. Acute sinusitis often follows a cold. Acute sinusitis causes thick, discolored mucus that drains from the nose or down the back of the throat. It also can cause pain and pressure in your head and face along with a stuffy or blocked nose.  In most cases, sinusitis gets better on its own in 1 to 2 weeks. But some mild symptoms may last for several weeks. Sometimes antibiotics are needed if there is a bacterial infection.  Follow-up care is a key part of your treatment and safety. Be sure to make and go to all appointments, and call your doctor if you are having problems. It's also a good idea to know your test results and keep a list of the medicines you take.  How can you care for yourself at home?  Use saline (saltwater) nasal washes. This can help keep your nasal passages open and wash out mucus and allergens.  You can buy saline nose washes at a grocery store or drugstore. Follow the instructions on the package.  You can make your own at home. Add 1 teaspoon of non-iodized salt and 1 teaspoon of baking soda to 2 cups of distilled or boiled and cooled water. Fill a squeeze bottle or a nasal cleansing pot (such as a neti pot) with the nasal wash. Then put the tip into your nostril, and lean over the sink. With your mouth open, gently squirt the liquid. Repeat on the other side.  Try a decongestant nasal spray like oxymetazoline (Afrin). Do not use it for more than 3 days in a row. Using it for more than 3 days can make your congestion worse.  If needed, take an over-the-counter pain medicine, such as acetaminophen (Tylenol), ibuprofen (Advil, Motrin), or naproxen (Aleve). Read and follow all instructions on the label.  If the doctor prescribed antibiotics, take them as directed. Do not stop taking them just  "because you feel better. You need to take the full course of antibiotics.  Be careful when taking over-the-counter cold or flu medicines and Tylenol at the same time. Many of these medicines have acetaminophen, which is Tylenol. Read the labels to make sure that you are not taking more than the recommended dose. Too much acetaminophen (Tylenol) can be harmful.  Try a steroid nasal spray. It may help with your symptoms.  Breathe warm, moist air. You can use a steamy shower, a hot bath, or a sink filled with hot water. Avoid cold, dry air. Using a humidifier in your home may help. Follow the directions for cleaning the machine.  When should you call for help?   Call your doctor now or seek immediate medical care if:    You have new or worse swelling, redness, or pain in your face or around one or both of your eyes.     You have double vision or a change in your vision.     You have a high fever.     You have a severe headache and a stiff neck.     You have mental changes, such as feeling confused or much less alert.   Watch closely for changes in your health, and be sure to contact your doctor if:    You are not getting better as expected.   Where can you learn more?  Go to https://www.[employer].net/patiented  Enter I933 in the search box to learn more about \"Acute Sinusitis: Care Instructions.\"  Current as of: September 27, 2023  Content Version: 14.2 2024 [facility] [employer].   Care instructions adapted under license by your healthcare professional. If you have questions about a medical condition or this instruction, always ask your healthcare professional. [employer] disclaims any warranty or liability for your use of this information.    Dear Saritha Pearson    After reviewing your responses, I've been able to diagnose you with sinusitis.      Based on your responses and diagnosis, I have prescribed Augmentin to treat your symptoms. I have sent this to your pharmacy.?     It is also important " to stay well hydrated, get lots of rest and take over-the-counter decongestants,?tylenol?or ibuprofen if you?are able to?take those medications per your primary care provider to help relieve discomfort.?     It is important that you take?all of?your prescribed medication even if your symptoms are improving after a few doses.? Taking?all of?your medicine helps prevent the symptoms from returning.?     If your symptoms worsen, you develop severe headache, vomiting, high fever (>102), or are not improving in 7 days, please contact your primary care provider for an appointment or visit any of our convenient Walk-in Care or Urgent Care Centers to be seen which can be found on our website?here.?     Thanks again for choosing?us?as your health care partner,?   ?  Mary Chang, RICKIE?

## 2025-01-02 ENCOUNTER — E-VISIT (OUTPATIENT)
Dept: URGENT CARE | Facility: CLINIC | Age: 60
End: 2025-01-02
Payer: COMMERCIAL

## 2025-01-02 DIAGNOSIS — J01.90 ACUTE SINUSITIS WITH SYMPTOMS > 10 DAYS: Primary | ICD-10-CM

## 2025-01-03 ENCOUNTER — TRANSFERRED RECORDS (OUTPATIENT)
Dept: HEALTH INFORMATION MANAGEMENT | Facility: CLINIC | Age: 60
End: 2025-01-03
Payer: COMMERCIAL

## 2025-01-03 NOTE — PATIENT INSTRUCTIONS
Thank you for choosing us for your care. I have placed an order for a prescription so that you can start treatment. View your full visit summary for details by clicking on the link below. Your pharmacist will able to address any questions you may have about the medication.     If you're not feeling better within 5-7 days, please schedule an appointment.  You can schedule an appointment right here in [facility], or call [phone]  If the visit is for the same symptoms as your eVisit, we'll refund the cost of your eVisit if seen within seven days.

## 2025-02-10 DIAGNOSIS — R73.03 PREDIABETES: ICD-10-CM

## 2025-02-10 DIAGNOSIS — E66.811 CLASS 1 OBESITY IN ADULT, UNSPECIFIED BMI, UNSPECIFIED OBESITY TYPE, UNSPECIFIED WHETHER SERIOUS COMORBIDITY PRESENT: ICD-10-CM

## 2025-02-10 DIAGNOSIS — Z83.3 FHX: TYPE 2 DIABETES MELLITUS: ICD-10-CM

## 2025-02-10 RX ORDER — SEMAGLUTIDE 1.7 MG/.75ML
INJECTION, SOLUTION SUBCUTANEOUS
Qty: 3 ML | Refills: 1 | Status: SHIPPED | OUTPATIENT
Start: 2025-02-10

## 2025-02-26 ENCOUNTER — TRANSFERRED RECORDS (OUTPATIENT)
Dept: HEALTH INFORMATION MANAGEMENT | Facility: CLINIC | Age: 60
End: 2025-02-26
Payer: COMMERCIAL

## 2025-03-20 ENCOUNTER — TRANSCRIBE ORDERS (OUTPATIENT)
Dept: ONCOLOGY | Facility: CLINIC | Age: 60
End: 2025-03-20
Payer: COMMERCIAL

## 2025-03-20 DIAGNOSIS — D17.1 LIPOMA OF BREAST: Primary | ICD-10-CM

## 2025-03-26 ENCOUNTER — OFFICE VISIT (OUTPATIENT)
Dept: SURGERY | Facility: CLINIC | Age: 60
End: 2025-03-26
Payer: COMMERCIAL

## 2025-03-26 ENCOUNTER — TELEPHONE (OUTPATIENT)
Dept: ONCOLOGY | Facility: CLINIC | Age: 60
End: 2025-03-26

## 2025-03-26 VITALS — WEIGHT: 207.7 LBS | HEIGHT: 66 IN | BODY MASS INDEX: 33.38 KG/M2

## 2025-03-26 DIAGNOSIS — D17.1 LIPOMA OF BREAST: ICD-10-CM

## 2025-03-26 RX ORDER — ACETAMINOPHEN 325 MG/1
975 TABLET ORAL ONCE
OUTPATIENT
Start: 2025-03-26 | End: 2025-03-26

## 2025-03-26 NOTE — LETTER
3/26/2025      Saritha Pearson  [address]  [place] [address]      Dear Colleague,    Thank you for referring your patient, Saritha Pearson, to the [facility]. Please see a copy of my visit note below.      [facility]  [address]  [place] [address]  Phone: [phone]    PATIENT NAME:  Saritha Pearson  PATIENT YOB: 1965    NEW SURGICAL ONCOLOGY CONSULTATION  Mar 26, 2025    Saritha Pearson is a 59 year old woman who presents with a right  breast complaint.  She was self-referred.    HPI:    She noted 3 masses in the right breast that were bothersome by the collarbone. They've been there for a couple of months.      Imaging showed a 2.4 cm lipoma at 12:00 21 cm FN in the RIGHT breast. No abnormalities were seen at the other two sites directed by the patient.    A biopsy was not performed.      BREAST-SPECIFIC HISTORY:  Prior breast surgeries: No  Prior radiation history: No  Dominant hand: Right    FAMILY HISTORY:  Breast ca: Yes pat aunts x3  Ovarian ca: Yes mother, sister    She received a referral for genetic counseling from her OBGYN Dr Topete.    Patient Active Problem List   Diagnosis     Constipation     Overweight     Right knee pain     Other postprocedural status(V45.89)     Sebaceous cyst     Hormone replacement therapy        Past Medical History:   Diagnosis Date     Asthma     asthma sx after resp infections     Constipation      Gastro-oesophageal reflux disease      PONV (postoperative nausea and vomiting)      Vaginal high risk HPV DNA test positive 2016, 2018, 2019    see problem list       Past Surgical History:   Procedure Laterality Date     ABDOMEN SURGERY      abdominal wall mass     ABDOMINOPLASTY      pt has post op infection     COSMETIC SURGERY       EXCISE LESION TRUNK  10/4/2011    Procedure:EXCISE LESION TRUNK; Excision Abdominal Wall Mass ; Surgeon:CARLITO BETTS; Location:[facility] OR     GYN SURGERY        HYSTERECTOMY       HYSTERECTOMY, PAP NO LONGER INDICATED  11/02/2018   No GA issues    Current Outpatient Medications   Medication Sig Dispense Refill     albuterol (PROAIR HFA/PROVENTIL HFA/VENTOLIN HFA) 108 (90 Base) MCG/ACT inhaler Inhale 2 puffs into the lungs every 4 hours as needed for shortness of breath or wheezing 18 g 0     amphetamine-dextroamphetamine (ADDERALL) 10 MG tablet Take 10 mg by mouth.       azelastine (ASTEPRO) 0.15 % nasal spray Spray 2 sprays into both nostrils daily 30 mL 5     diclofenac (VOLTAREN) 1 % topical gel Apply 4 g topically 4 times daily (Patient taking differently: Apply 4 g topically as needed.) 150 g 0     estradiol (ESTRACE) 0.5 MG tablet Take 1 tablet (0.5 mg) by mouth daily 90 tablet 3     fexofenadine (ALLEGRA) 180 MG tablet Take 1 tablet (180 mg) by mouth daily. 30 tablet 3     latanoprost (XALATAN) 0.005 % ophthalmic solution        omeprazole (PRILOSEC) 20 MG DR capsule take 1 capsule by oral route 2 times every day 30 minutes to 1 hour before first meal and 12 hours later       polyethylene glycol (MIRALAX) 17 GM/Dose powder TAKE 3 CAPFULS BY MOUTH 3 times DAILY for constipation       RESTASIS 0.05 % ophthalmic emulsion        RETIN-A 0.1 % external cream        semaglutide (OZEMPIC) 2 MG/3ML pen Inject 0.25 mg Subcutaneous every 7 days 3 mL 1     Semaglutide-Weight Management (WEGOVY) 0.25 MG/0.5ML pen Inject 0.25 mg Subcutaneous once a week 2 mL 1     Semaglutide-Weight Management (WEGOVY) 0.25 MG/0.5ML pen Inject 0.25 mg Subcutaneous once a week 2 mL 1     Semaglutide-Weight Management (WEGOVY) 0.5 MG/0.5ML pen Inject 0.5 mg Subcutaneous once a week This is double dosage 2 mL 1     timolol maleate (TIMOPTIC) 0.5 % ophthalmic solution        WEGOVY 1 MG/0.5ML pen INJECT 1 MG SUBCUTANEOUSLY ONCE A WEEK 2 mL 1     WEGOVY 1.7 MG/0.75ML pen INJECT 1.7 MG SUBCUTANEOUSLY ONCE A WEEK. 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   Pulmonary:      Effort: No respiratory distress.   Chest:       Skin:     General: Skin is warm and dry.          INVESTIGATIONS:    Diagnostic Mammogram & Ultrasound at [facility] (2/26/2025) showed:  Bilateral Digital Diagnostic Mammogram  FINDINGS:   Right: There is no evidence for spiculated masses, architectural distortion, asymmetry or suspicious calcifications. The BB markers are noted in the right breast/axilla, with no definite mammographic abnormality underlying the palpable markers.  Left: There is no evidence for spiculated masses, architectural distortion, asymmetry or suspicious calcifications.   RIGHT Breast Ultrasound  FINDINGS:  A lipoma is noted at 12:00 21 cm from nipple measuring 2.4 x 1.7 x 0.7 cm corresponding to one of hte areas of palpable finding. No sonographic correlate was identified for the 2 other palpable findings at 12:00 19 cm from nipple, and 1:00 14 cm from nipple.  BIRADS 2    ASSESSMENT:  Saritha Pearson is a 59 year old woman with a lipoma of the right breast.    I personally reviewed the imaging above with our in-house breast radiologist.    I reviewed the imaging and diagnosis with Saritha Pearson.  The natural history of lipoma was discussed with the patient and her mother .  Lipoma are benign non-proliferative lesions.  They can be managed expectantly.  Alternatively, for symptom relief, surgical excision in the form of lumpectomy is reasonable as well.    The risks of a lumpectomy were discussed with the patient and family, including the risks of bleeding, wound infection, wound dehiscence, and seroma. We discussed that prescriptions for narcotics are not " typically provided for this procedure. We discussed that surgical pathology results will be reviewed at the postoperative visit to allow for careful discussion of next steps and for answering questions.      I was not able to identify the other two masses. Thus, we discussed that only the lipoma would be removed at the time of surgery.    All of the above was discussed with Saritha Pearson and all questions were answered.  Saritha Pearson strongly prefers to have the lipoma removed.  She elected to proceed with RIGHT  lumpectomy .    Total time spent with the patient was 20 minutes, of which 75% was counseling.     PLAN:  RIGHT lumpectomy  Patient has already held Wegovy since 3/10  Stop retinoid products on neck and chest given proximity to surgical site.  Patient to report to her PCP for preoperative H&P and testing.     Janis Segovia MD MS [facility] FACS  Associate Professor of Surgery  Division of Surgical Oncology  [facility]     30 minutes spent on the date of the encounter doing chart review, review of outside records, review of test result(s), interpretation of test(s), patient visit, documentation, care coordination, discussion with other physician(s), and discussion with family.      Again, thank you for allowing me to participate in the care of your patient.        Sincerely,        Janis Segovia MD    Electronically signed

## 2025-03-26 NOTE — NURSING NOTE
Called pt to advise her to reschedule her dental appt that is scheduled the same day as surgery. Pt states she did and rescheduled it to the next day. Discussed with pt that she may still be tired after surgery and may want to push her dental appt out further if able. Pt voiced understanding and states she will consider this and call to most likely reschedule her dental appt again.  Pt states she was able to schedule a pre-op tomorrow. No further questions at this time ..Kenya Calhoun RN

## 2025-03-26 NOTE — TELEPHONE ENCOUNTER
Called patient to schedule surgery with Dr. Segovia.    Spoke with:  patient    Date of Surgery: 3/31/2025    Arrival time Discussed with Patient:  Yes; tentatively, pending official times from Pre-op RN call.    Location of surgery: [facility]     Pre-Op H&P: PCP, [facility] [place]    Post-Op Appts: 4/16/25, 2:00 PM, MG     Imaging:  No      Discussed with patient pre-op RN will call 2-3 days prior to surgery with arrival time and instructions:  Yes     Packet sent out: Yes  via Received in clinic by patient  [DATE] 3/26/25; writer to send via [employer] with map of [place]    Surgical Soap: Receiving via Received in clinic by patient       Informed patient that they will need an adult  to bring patient home from surgery: Yes  : patient confirmed understanding.       Additional Comments:  Patient inquired about discharge time on 3/31 as they have a dentist appointment at 4:00 PM; writer stated they will have RN advise. Patient prefers phone call.      All patients questions were answered and patient was instructed to review surgical packet and call back with any questions or concerns.       Gallito Tovar on 3/26/2025 at 3:46 PM

## 2025-03-26 NOTE — NURSING NOTE
"Saritha Pearson's goals for this visit include:   Chief Complaint   Patient presents with    Consult     Breast breast lipoma       She requests these members of her care team be copied on today's visit information: Yes    PCP: Aristides Rogers    Referring Provider:  Referred Self, MD  No address on file    Ht 1.676 m (5' 6\")   Wt 94.2 kg (207 lb 11.2 oz)   BMI 33.52 kg/m      Do you need any medication refills at today's visit? No    Rachna Lindsey LPN      "

## 2025-03-26 NOTE — PROGRESS NOTES
[facility]  [id number] [place] [id number]  Phone: [phone]    PATIENT NAME:  Saritha Pearson  PATIENT YOB: 1965    NEW SURGICAL ONCOLOGY CONSULTATION  Mar 26, 2025    Saritha Pearson is a 59 year old woman who presents with a right  breast complaint.  She was self-referred.    HPI:    She noted 3 masses in the right breast that were bothersome by the collarbone. They've been there for a couple of months.      Imaging showed a 2.4 cm lipoma at 12:00 21 cm FN in the RIGHT breast. No abnormalities were seen at the other two sites directed by the patient.    A biopsy was not performed.      BREAST-SPECIFIC HISTORY:  Prior breast surgeries: No  Prior radiation history: No  Dominant hand: Right    FAMILY HISTORY:  Breast ca: Yes pat aunts x3  Ovarian ca: Yes mother, sister    She received a referral for genetic counseling from her OBGYN Dr Topete.    Patient Active Problem List   Diagnosis    Constipation    Overweight    Right knee pain    Other postprocedural status(V45.89)    Sebaceous cyst    Hormone replacement therapy        Past Medical History:   Diagnosis Date    Asthma     asthma sx after resp infections    Constipation     Gastro-oesophageal reflux disease     PONV (postoperative nausea and vomiting)     Vaginal high risk HPV DNA test positive 2016, 2018, 2019    see problem list       Past Surgical History:   Procedure Laterality Date    ABDOMEN SURGERY      abdominal wall mass    ABDOMINOPLASTY      pt has post op infection    COSMETIC SURGERY      EXCISE LESION TRUNK  10/4/2011    Procedure:EXCISE LESION TRUNK; Excision Abdominal Wall Mass ; Surgeon:CARLITO BETTS; Location:[facility]    GYN SURGERY      HYSTERECTOMY      HYSTERECTOMY, PAP NO LONGER INDICATED  11/02/2018   No GA issues    Current Outpatient Medications   Medication Sig Dispense Refill    albuterol (PROAIR HFA/PROVENTIL HFA/VENTOLIN HFA) 108 (90 Base) MCG/ACT inhaler Inhale 2 puffs  "into the lungs every 4 hours as needed for shortness of breath or wheezing 18 g 0    amphetamine-dextroamphetamine (ADDERALL) 10 MG tablet Take 10 mg by mouth.      azelastine (ASTEPRO) 0.15 % nasal spray Spray 2 sprays into both nostrils daily 30 mL 5    diclofenac (VOLTAREN) 1 % topical gel Apply 4 g topically 4 times daily (Patient taking differently: Apply 4 g topically as needed.) 150 g 0    estradiol (ESTRACE) 0.5 MG tablet Take 1 tablet (0.5 mg) by mouth daily 90 tablet 3    fexofenadine (ALLEGRA) 180 MG tablet Take 1 tablet (180 mg) by mouth daily. 30 tablet 3    latanoprost (XALATAN) 0.005 % ophthalmic solution       omeprazole (PRILOSEC) 20 MG DR capsule take 1 capsule by oral route 2 times every day 30 minutes to 1 hour before first meal and 12 hours later      polyethylene glycol (MIRALAX) 17 GM/Dose powder TAKE 3 CAPFULS BY MOUTH 3 times DAILY for constipation      RESTASIS 0.05 % ophthalmic emulsion       RETIN-A 0.1 % external cream       semaglutide (OZEMPIC) 2 MG/3ML pen Inject 0.25 mg Subcutaneous every 7 days 3 mL 1    Semaglutide-Weight Management (WEGOVY) 0.25 MG/0.5ML pen Inject 0.25 mg Subcutaneous once a week 2 mL 1    Semaglutide-Weight Management (WEGOVY) 0.25 MG/0.5ML pen Inject 0.25 mg Subcutaneous once a week 2 mL 1    Semaglutide-Weight Management (WEGOVY) 0.5 MG/0.5ML pen Inject 0.5 mg Subcutaneous once a week This is double dosage 2 mL 1    timolol maleate (TIMOPTIC) 0.5 % ophthalmic solution       WEGOVY 1 MG/0.5ML pen INJECT 1 MG SUBCUTANEOUSLY ONCE A WEEK 2 mL 1    WEGOVY 1.7 MG/0.75ML pen INJECT 1.7 MG SUBCUTANEOUSLY ONCE A WEEK. 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   Pulmonary:      Effort: No respiratory distress.   Chest:       Skin:     General: Skin is warm and dry.          INVESTIGATIONS:    Diagnostic Mammogram & Ultrasound at [facility] (2/26/2025) showed:  Bilateral Digital Diagnostic Mammogram  FINDINGS:   Right: There is no evidence for spiculated masses, architectural distortion, asymmetry or suspicious calcifications. The BB markers are noted in the right breast/axilla, with no definite mammographic abnormality underlying the palpable markers.  Left: There is no evidence for spiculated masses, architectural distortion, asymmetry or suspicious calcifications.   RIGHT Breast Ultrasound  FINDINGS:  A lipoma is noted at 12:00 21 cm from nipple measuring 2.4 x 1.7 x 0.7 cm corresponding to one of hte areas of palpable finding. No sonographic correlate was identified for the 2 other palpable findings at 12:00 19 cm from nipple, and 1:00 14 cm from nipple.  BIRADS 2    ASSESSMENT:  Saritha Pearson is a 59 year old woman with a lipoma of the right breast.    I personally reviewed the imaging above with our in-house breast radiologist.    I reviewed the imaging and diagnosis with Saritha Pearson.  The natural history of lipoma was discussed with the patient and her mother .  Lipoma are benign non-proliferative lesions.  They can be managed expectantly.  Alternatively, for symptom relief, surgical excision in the form of lumpectomy is reasonable as well.    The risks of a lumpectomy were discussed with the patient and family, including the risks of bleeding, wound infection, wound dehiscence, and seroma. We discussed that prescriptions for narcotics are not typically provided for this procedure. We discussed that surgical pathology results will be reviewed at the postoperative visit to allow for careful discussion of next steps and for answering questions.      I was not able to identify the other two masses. Thus, we discussed that  only the lipoma would be removed at the time of surgery.    All of the above was discussed with Saritha Pearson and all questions were answered.  Saritha Pearson strongly prefers to have the lipoma removed.  She elected to proceed with RIGHT  lumpectomy .    Total time spent with the patient was 20 minutes, of which 75% was counseling.     PLAN:  RIGHT lumpectomy  Patient has already held Wegovy since 3/10  Stop retinoid products on neck and chest given proximity to surgical site.  Patient to report to her PCP for preoperative H&P and testing.     Janis Segovia MD MS [facility] FACS  Associate Professor of Surgery  Division of Surgical Oncology  [facility]     30 minutes spent on the date of the encounter doing chart review, review of outside records, review of test result(s), interpretation of test(s), patient visit, documentation, care coordination, discussion with other physician(s), and discussion with family.

## 2025-03-27 ENCOUNTER — OFFICE VISIT (OUTPATIENT)
Dept: FAMILY MEDICINE | Facility: CLINIC | Age: 60
End: 2025-03-27
Payer: COMMERCIAL

## 2025-03-27 VITALS
WEIGHT: 209.6 LBS | RESPIRATION RATE: 20 BRPM | HEART RATE: 93 BPM | TEMPERATURE: 98.5 F | BODY MASS INDEX: 33.68 KG/M2 | OXYGEN SATURATION: 96 % | SYSTOLIC BLOOD PRESSURE: 137 MMHG | DIASTOLIC BLOOD PRESSURE: 84 MMHG | HEIGHT: 66 IN

## 2025-03-27 DIAGNOSIS — D17.1 LIPOMA OF BREAST: ICD-10-CM

## 2025-03-27 DIAGNOSIS — E66.811 CLASS 1 OBESITY IN ADULT, UNSPECIFIED BMI, UNSPECIFIED OBESITY TYPE, UNSPECIFIED WHETHER SERIOUS COMORBIDITY PRESENT: ICD-10-CM

## 2025-03-27 DIAGNOSIS — J84.10 LUNG GRANULOMA (H): ICD-10-CM

## 2025-03-27 DIAGNOSIS — R73.03 PREDIABETES: ICD-10-CM

## 2025-03-27 DIAGNOSIS — Z01.818 PREOP GENERAL PHYSICAL EXAM: Primary | ICD-10-CM

## 2025-03-27 LAB
BASOPHILS # BLD AUTO: 0 10E3/UL (ref 0–0.2)
BASOPHILS NFR BLD AUTO: 1 %
EOSINOPHIL # BLD AUTO: 0.1 10E3/UL (ref 0–0.7)
EOSINOPHIL NFR BLD AUTO: 1 %
ERYTHROCYTE [DISTWIDTH] IN BLOOD BY AUTOMATED COUNT: 12.6 % (ref 10–15)
HCT VFR BLD AUTO: 39.9 % (ref 35–47)
HGB BLD-MCNC: 12.5 G/DL (ref 11.7–15.7)
IMM GRANULOCYTES # BLD: 0 10E3/UL
IMM GRANULOCYTES NFR BLD: 0 %
LYMPHOCYTES # BLD AUTO: 2.7 10E3/UL (ref 0.8–5.3)
LYMPHOCYTES NFR BLD AUTO: 44 %
MCH RBC QN AUTO: 25.5 PG (ref 26.5–33)
MCHC RBC AUTO-ENTMCNC: 31.3 G/DL (ref 31.5–36.5)
MCV RBC AUTO: 81 FL (ref 78–100)
MONOCYTES # BLD AUTO: 0.4 10E3/UL (ref 0–1.3)
MONOCYTES NFR BLD AUTO: 7 %
NEUTROPHILS # BLD AUTO: 2.9 10E3/UL (ref 1.6–8.3)
NEUTROPHILS NFR BLD AUTO: 47 %
PLATELET # BLD AUTO: 321 10E3/UL (ref 150–450)
RBC # BLD AUTO: 4.91 10E6/UL (ref 3.8–5.2)
WBC # BLD AUTO: 6.2 10E3/UL (ref 4–11)

## 2025-03-27 ASSESSMENT — PAIN SCALES - GENERAL: PAINLEVEL_OUTOF10: NO PAIN (0)

## 2025-03-27 NOTE — PATIENT INSTRUCTIONS

## 2025-03-27 NOTE — PROGRESS NOTES
Preoperative Evaluation  [facility]  [address] [facility] [address]  Phone: [phone]  Primary Provider: Arsitides Rogers MD  Pre-op Performing Provider: ALEX Fletcher CNP  Mar 27, 2025           3/27/2025   Surgical Information   What procedure is being done? right upper breast mass removal    Facility or Hospital where procedure/surgery will be performed: [facility]    Who is doing the procedure / surgery? Dr. Janis Segovia    Date of surgery / procedure: 03/31/2025    Time of surgery / procedure: TBD    Where do you plan to recover after surgery? at home with family        Proxy-reported     Fax number for surgical facility: Note does not need to be faxed, will be available electronically in Epic.    Assessment & Plan     The proposed surgical procedure is considered INTERMEDIATE risk.    Preop general physical exam    - CBC with Platelets & Differential  - Basic metabolic panel    Lipoma of breast  -with plans for surgical removal/lumpectomy. Discussed with patient that lipomas are typically not at risk of turning into cancer.     Prediabetes  Last A1C 5.8    Class 1 obesity in adult, unspecified BMI, unspecified obesity type, unspecified whether serious comorbidity present  -She has not taken her semaglutide (Wegovy) injection for past 11 days due to side effect of constipation. Discussed not restarting her semaglutide until after surgery. Patient verbalized understanding.     Lung granuloma (H)  Incidental finding on chest/abd CT in 2023. Follow up CT imaging stable. Not experiencing any cough, shortness of breath, unexplained weight loss, or dyspnea on exertion.             - No identified additional risk factors other than previously addressed    Preoperative Medication Instructions  Antiplatelet or Anticoagulation Medication Instructions   - We reviewed the medication list and the patient is not on an antiplatelet or anticoagulation  medications.    Additional Medication Instructions   - fiber, laxatives: DO NOT TAKE day of surgery     - Psychostimulants: Hold the day of surgery   - GLP-1 Injectable (exenitide, liraglutide, semaglutide, dulaglutide, etc.): DO NOT TAKE 7 days before surgery     Recommendation  Approval given to proceed with proposed procedure, without further diagnostic evaluation.    Giovanna Melara is a 59 year old, presenting for the following:  Pre-Op Exam          3/27/2025    10:36 AM   Additional Questions   Roomed by Herminia ALLEN   Accompanied by self     HPI: Patient states she has a lipoma on her breast causing discomfort. She would like this removed. She is concerned it could come back and turn into cancer.           3/27/2025   Pre-Op Questionnaire   Have you ever had a heart attack or stroke? No    Have you ever had surgery on your heart or blood vessels, such as a stent placement, a coronary artery bypass, or surgery on an artery in your head, neck, heart, or legs? No    Do you have chest pain with activity? No    Do you have a history of heart failure? No    Do you currently have a cold, bronchitis or symptoms of other infection? No    Do you have a cough, shortness of breath, or wheezing? No    Do you or anyone in your family have previous history of blood clots? No    Do you or does anyone in your family have a serious bleeding problem such as prolonged bleeding following surgeries or cuts? No    Have you ever had problems with anemia or been told to take iron pills? No    Have you had any abnormal blood loss such as black, tarry or bloody stools, or abnormal vaginal bleeding? No    Have you ever had a blood transfusion? No    Are you willing to have a blood transfusion if it is medically needed before, during, or after your surgery? Yes    Have you or any of your relatives ever had problems with anesthesia? No    Do you have sleep apnea, excessive snoring or daytime drowsiness? No    Do you have any artifical  heart valves or other implanted medical devices like a pacemaker, defibrillator, or continuous glucose monitor? No    Do you have artificial joints? No    Are you allergic to latex? No        Proxy-reported     Health Care Directive  Patient does not have a Health Care Directive: Discussed advance care planning with patient; however, patient declined at this time.    Preoperative Review of    reviewed - controlled substances reflected in medication list.          Patient Active Problem List    Diagnosis Date Noted    Lipoma of breast 03/26/2025     Priority: Medium    Hormone replacement therapy 01/23/2021     Priority: Medium    Sebaceous cyst 02/25/2020     Priority: Medium     Added automatically from request for surgery [id number]      Other postprocedural status(V45.89) 07/21/2016     Priority: Medium    Right knee pain 04/28/2016     Priority: Medium    Overweight 12/07/2015     Priority: Medium    Constipation 05/18/2015     Priority: Medium      Past Medical History:   Diagnosis Date    Asthma     asthma sx after resp infections    Constipation     Gastro-oesophageal reflux disease     PONV (postoperative nausea and vomiting)     Vaginal high risk HPV DNA test positive 2016, 2018, 2019    see problem list     Past Surgical History:   Procedure Laterality Date    ABDOMEN SURGERY      abdominal wall mass    ABDOMINOPLASTY      pt has post op infection    COSMETIC SURGERY      EXCISE LESION TRUNK  10/4/2011    Procedure:EXCISE LESION TRUNK; Excision Abdominal Wall Mass ; Surgeon:CARLITO BETTS; Location:[facility]    GYN SURGERY      HYSTERECTOMY      HYSTERECTOMY, PAP NO LONGER INDICATED  11/02/2018     Current Outpatient Medications   Medication Sig Dispense Refill    albuterol (PROAIR HFA/PROVENTIL HFA/VENTOLIN HFA) 108 (90 Base) MCG/ACT inhaler Inhale 2 puffs into the lungs every 4 hours as needed for shortness of breath or wheezing 18 g 0    amphetamine-dextroamphetamine (ADDERALL) 10 MG tablet Take  10 mg by mouth. (Patient not taking: Reported on 3/26/2025)      azelastine (ASTEPRO) 0.15 % nasal spray Spray 2 sprays into both nostrils daily 30 mL 5    diclofenac (VOLTAREN) 1 % topical gel Apply 4 g topically 4 times daily (Patient not taking: Reported on 3/26/2025) 150 g 0    estradiol (ESTRACE) 0.5 MG tablet Take 1 tablet (0.5 mg) by mouth daily (Patient not taking: Reported on 3/26/2025) 90 tablet 3    fexofenadine (ALLEGRA) 180 MG tablet Take 1 tablet (180 mg) by mouth daily. 30 tablet 3    latanoprost (XALATAN) 0.005 % ophthalmic solution       omeprazole (PRILOSEC) 20 MG DR capsule take 1 capsule by oral route 2 times every day 30 minutes to 1 hour before first meal and 12 hours later      polyethylene glycol (MIRALAX) 17 GM/Dose powder TAKE 3 CAPFULS BY MOUTH 3 times DAILY for constipation      RESTASIS 0.05 % ophthalmic emulsion       RETIN-A 0.1 % external cream       semaglutide (OZEMPIC) 2 MG/3ML pen Inject 0.25 mg Subcutaneous every 7 days 3 mL 1    Semaglutide-Weight Management (WEGOVY) 0.25 MG/0.5ML pen Inject 0.25 mg Subcutaneous once a week 2 mL 1    Semaglutide-Weight Management (WEGOVY) 0.25 MG/0.5ML pen Inject 0.25 mg Subcutaneous once a week 2 mL 1    Semaglutide-Weight Management (WEGOVY) 0.5 MG/0.5ML pen Inject 0.5 mg Subcutaneous once a week This is double dosage 2 mL 1    timolol maleate (TIMOPTIC) 0.5 % ophthalmic solution       WEGOVY 1 MG/0.5ML pen INJECT 1 MG SUBCUTANEOUSLY ONCE A WEEK 2 mL 1    WEGOVY 1.7 MG/0.75ML pen INJECT 1.7 MG SUBCUTANEOUSLY ONCE A WEEK. 3 mL 1       Allergies   Allergen Reactions    Sulfa Antibiotics Hives, Itching and Rash        Social History     Tobacco Use    Smoking status: Never     Passive exposure: Never    Smokeless tobacco: Never   Substance Use Topics    Alcohol use: Yes     Comment: occasionally       History   Drug Use No               Objective    There were no vitals taken for this visit.   Estimated body mass index is 33.52 kg/m  as  "calculated from the following:    Height as of 3/26/25: 1.676 m (5' 6\").    Weight as of 3/26/25: 94.2 kg (207 lb 11.2 oz).  Physical Exam  GENERAL: alert and no distress  EYES: Eyes grossly normal to inspection, PERRL and conjunctivae and sclerae normal  HENT: ear canals and TM's normal, nose and mouth without ulcers or lesions  NECK: no adenopathy, no asymmetry, masses, or scars  RESP: lungs clear to auscultation - no rales, rhonchi or wheezes  CV: regular rate and rhythm, normal S1 S2, no S3 or S4, no murmur, click or rub, no peripheral edema  ABDOMEN: soft, nontender, no hepatosplenomegaly, no masses and bowel sounds normal  MS: no gross musculoskeletal defects noted, no edema  SKIN: no suspicious lesions or rashes  NEURO: Normal strength and tone, mentation intact and speech normal  PSYCH: mentation appears normal, affect normal/bright    Recent Labs   Lab Test 10/17/24  1144      POTASSIUM 4.2   CR 0.76   A1C 5.8*        Diagnostics  Recent Results (from the past 720 hours)   CBC with platelets and differential    Collection Time: 03/27/25 11:11 AM   Result Value Ref Range    WBC Count 6.2 4.0 - 11.0 10e3/uL    RBC Count 4.91 3.80 - 5.20 10e6/uL    Hemoglobin 12.5 11.7 - 15.7 g/dL    Hematocrit 39.9 35.0 - 47.0 %    MCV 81 78 - 100 fL    MCH 25.5 (L) 26.5 - 33.0 pg    MCHC 31.3 (L) 31.5 - 36.5 g/dL    RDW 12.6 10.0 - 15.0 %    Platelet Count 321 150 - 450 10e3/uL    % Neutrophils 47 %    % Lymphocytes 44 %    % Monocytes 7 %    % Eosinophils 1 %    % Basophils 1 %    % Immature Granulocytes 0 %    Absolute Neutrophils 2.9 1.6 - 8.3 10e3/uL    Absolute Lymphocytes 2.7 0.8 - 5.3 10e3/uL    Absolute Monocytes 0.4 0.0 - 1.3 10e3/uL    Absolute Eosinophils 0.1 0.0 - 0.7 10e3/uL    Absolute Basophils 0.0 0.0 - 0.2 10e3/uL    Absolute Immature Granulocytes 0.0 <=0.4 10e3/uL      No EKG required, no history of coronary heart disease, significant arrhythmia, peripheral arterial disease or other structural heart " disease.    Revised Cardiac Risk Index (RCRI)  The patient has the following serious cardiovascular risks for perioperative complications:   - No serious cardiac risks = 0 points     RCRI Interpretation: 0 points: Class I (very low risk - 0.4% complication rate)         Signed Electronically by: ALEX Fletcher CNP  A copy of this evaluation report is provided to the requesting physician.

## 2025-03-28 ENCOUNTER — ANESTHESIA EVENT (OUTPATIENT)
Dept: SURGERY | Facility: AMBULATORY SURGERY CENTER | Age: 60
End: 2025-03-28
Payer: COMMERCIAL

## 2025-03-31 ENCOUNTER — HOSPITAL ENCOUNTER (OUTPATIENT)
Facility: AMBULATORY SURGERY CENTER | Age: 60
Discharge: HOME | End: 2025-03-31
Attending: SURGERY
Payer: COMMERCIAL

## 2025-03-31 ENCOUNTER — ANESTHESIA (OUTPATIENT)
Dept: SURGERY | Facility: AMBULATORY SURGERY CENTER | Age: 60
End: 2025-03-31
Payer: COMMERCIAL

## 2025-03-31 VITALS
HEART RATE: 91 BPM | OXYGEN SATURATION: 96 % | DIASTOLIC BLOOD PRESSURE: 83 MMHG | RESPIRATION RATE: 14 BRPM | TEMPERATURE: 97.3 F | SYSTOLIC BLOOD PRESSURE: 137 MMHG | BODY MASS INDEX: 33.27 KG/M2 | WEIGHT: 207 LBS | HEIGHT: 66 IN

## 2025-03-31 DIAGNOSIS — D17.1 LIPOMA OF BREAST: Primary | ICD-10-CM

## 2025-03-31 PROCEDURE — 88304 TISSUE EXAM BY PATHOLOGIST: CPT | Mod: TC | Performed by: SURGERY

## 2025-03-31 PROCEDURE — 88304 TISSUE EXAM BY PATHOLOGIST: CPT | Mod: 26 | Performed by: PATHOLOGY

## 2025-03-31 RX ORDER — PROPOFOL 10 MG/ML
INJECTION, EMULSION INTRAVENOUS PRN
Status: DISCONTINUED | OUTPATIENT
Start: 2025-03-31 | End: 2025-03-31

## 2025-03-31 RX ORDER — ONDANSETRON 4 MG/1
4 TABLET, FILM COATED ORAL EVERY 8 HOURS PRN
Qty: 15 TABLET | Refills: 0 | Status: SHIPPED | OUTPATIENT
Start: 2025-03-31 | End: 2025-04-15

## 2025-03-31 RX ORDER — OXYCODONE HYDROCHLORIDE 5 MG/1
5 TABLET ORAL
Status: DISCONTINUED | OUTPATIENT
Start: 2025-03-31 | End: 2025-04-01 | Stop reason: HOSPADM

## 2025-03-31 RX ORDER — ONDANSETRON 2 MG/ML
4 INJECTION INTRAMUSCULAR; INTRAVENOUS EVERY 30 MIN PRN
Status: DISCONTINUED | OUTPATIENT
Start: 2025-03-31 | End: 2025-04-01 | Stop reason: HOSPADM

## 2025-03-31 RX ORDER — NALOXONE HYDROCHLORIDE 0.4 MG/ML
0.1 INJECTION, SOLUTION INTRAMUSCULAR; INTRAVENOUS; SUBCUTANEOUS
Status: DISCONTINUED | OUTPATIENT
Start: 2025-03-31 | End: 2025-04-01 | Stop reason: HOSPADM

## 2025-03-31 RX ORDER — FENTANYL CITRATE 50 UG/ML
50 INJECTION, SOLUTION INTRAMUSCULAR; INTRAVENOUS EVERY 5 MIN PRN
Status: DISCONTINUED | OUTPATIENT
Start: 2025-03-31 | End: 2025-04-01 | Stop reason: HOSPADM

## 2025-03-31 RX ORDER — HYDROMORPHONE HYDROCHLORIDE 1 MG/ML
0.2 INJECTION, SOLUTION INTRAMUSCULAR; INTRAVENOUS; SUBCUTANEOUS EVERY 5 MIN PRN
Status: DISCONTINUED | OUTPATIENT
Start: 2025-03-31 | End: 2025-04-01 | Stop reason: HOSPADM

## 2025-03-31 RX ORDER — ONDANSETRON 4 MG/1
4 TABLET, ORALLY DISINTEGRATING ORAL EVERY 30 MIN PRN
Status: DISCONTINUED | OUTPATIENT
Start: 2025-03-31 | End: 2025-04-01 | Stop reason: HOSPADM

## 2025-03-31 RX ORDER — CEFAZOLIN SODIUM 2 G/50ML
2 SOLUTION INTRAVENOUS SEE ADMIN INSTRUCTIONS
Status: DISCONTINUED | OUTPATIENT
Start: 2025-03-31 | End: 2025-04-01 | Stop reason: HOSPADM

## 2025-03-31 RX ORDER — ACETAMINOPHEN 325 MG/1
975 TABLET ORAL ONCE
Status: COMPLETED | OUTPATIENT
Start: 2025-03-31 | End: 2025-03-31

## 2025-03-31 RX ORDER — BUPIVACAINE HYDROCHLORIDE AND EPINEPHRINE 2.5; 5 MG/ML; UG/ML
INJECTION, SOLUTION EPIDURAL; INFILTRATION; INTRACAUDAL; PERINEURAL PRN
Status: DISCONTINUED | OUTPATIENT
Start: 2025-03-31 | End: 2025-03-31 | Stop reason: HOSPADM

## 2025-03-31 RX ORDER — ONDANSETRON 2 MG/ML
INJECTION INTRAMUSCULAR; INTRAVENOUS PRN
Status: DISCONTINUED | OUTPATIENT
Start: 2025-03-31 | End: 2025-03-31

## 2025-03-31 RX ORDER — DEXAMETHASONE SODIUM PHOSPHATE 10 MG/ML
4 INJECTION, SOLUTION INTRAMUSCULAR; INTRAVENOUS
Status: DISCONTINUED | OUTPATIENT
Start: 2025-03-31 | End: 2025-04-01 | Stop reason: HOSPADM

## 2025-03-31 RX ORDER — OXYCODONE HYDROCHLORIDE 5 MG/1
10 TABLET ORAL
Status: DISCONTINUED | OUTPATIENT
Start: 2025-03-31 | End: 2025-04-01 | Stop reason: HOSPADM

## 2025-03-31 RX ORDER — LIDOCAINE HYDROCHLORIDE 20 MG/ML
INJECTION, SOLUTION INFILTRATION; PERINEURAL PRN
Status: DISCONTINUED | OUTPATIENT
Start: 2025-03-31 | End: 2025-03-31

## 2025-03-31 RX ORDER — FENTANYL CITRATE 50 UG/ML
25 INJECTION, SOLUTION INTRAMUSCULAR; INTRAVENOUS EVERY 5 MIN PRN
Status: DISCONTINUED | OUTPATIENT
Start: 2025-03-31 | End: 2025-04-01 | Stop reason: HOSPADM

## 2025-03-31 RX ORDER — SODIUM CHLORIDE, SODIUM LACTATE, POTASSIUM CHLORIDE, CALCIUM CHLORIDE 600; 310; 30; 20 MG/100ML; MG/100ML; MG/100ML; MG/100ML
INJECTION, SOLUTION INTRAVENOUS CONTINUOUS
Status: DISCONTINUED | OUTPATIENT
Start: 2025-03-31 | End: 2025-04-01 | Stop reason: HOSPADM

## 2025-03-31 RX ORDER — CEFAZOLIN SODIUM 2 G/50ML
2 SOLUTION INTRAVENOUS
Status: COMPLETED | OUTPATIENT
Start: 2025-03-31 | End: 2025-03-31

## 2025-03-31 RX ORDER — LABETALOL HYDROCHLORIDE 5 MG/ML
10 INJECTION, SOLUTION INTRAVENOUS
Status: DISCONTINUED | OUTPATIENT
Start: 2025-03-31 | End: 2025-04-01 | Stop reason: HOSPADM

## 2025-03-31 RX ORDER — FENTANYL CITRATE 50 UG/ML
INJECTION, SOLUTION INTRAMUSCULAR; INTRAVENOUS PRN
Status: DISCONTINUED | OUTPATIENT
Start: 2025-03-31 | End: 2025-03-31

## 2025-03-31 RX ORDER — ACETAMINOPHEN 325 MG/1
975 TABLET ORAL ONCE
Status: DISCONTINUED | OUTPATIENT
Start: 2025-03-31 | End: 2025-04-01 | Stop reason: HOSPADM

## 2025-03-31 RX ORDER — DEXAMETHASONE SODIUM PHOSPHATE 4 MG/ML
INJECTION, SOLUTION INTRA-ARTICULAR; INTRALESIONAL; INTRAMUSCULAR; INTRAVENOUS; SOFT TISSUE PRN
Status: DISCONTINUED | OUTPATIENT
Start: 2025-03-31 | End: 2025-03-31

## 2025-03-31 RX ORDER — HYDROMORPHONE HYDROCHLORIDE 1 MG/ML
0.4 INJECTION, SOLUTION INTRAMUSCULAR; INTRAVENOUS; SUBCUTANEOUS EVERY 5 MIN PRN
Status: DISCONTINUED | OUTPATIENT
Start: 2025-03-31 | End: 2025-04-01 | Stop reason: HOSPADM

## 2025-03-31 RX ORDER — LIDOCAINE 40 MG/G
CREAM TOPICAL
Status: DISCONTINUED | OUTPATIENT
Start: 2025-03-31 | End: 2025-04-01 | Stop reason: HOSPADM

## 2025-03-31 RX ORDER — PROPOFOL 10 MG/ML
INJECTION, EMULSION INTRAVENOUS CONTINUOUS PRN
Status: DISCONTINUED | OUTPATIENT
Start: 2025-03-31 | End: 2025-03-31

## 2025-03-31 RX ORDER — HYDRALAZINE HYDROCHLORIDE 20 MG/ML
2.5-5 INJECTION INTRAMUSCULAR; INTRAVENOUS EVERY 10 MIN PRN
Status: DISCONTINUED | OUTPATIENT
Start: 2025-03-31 | End: 2025-04-01 | Stop reason: HOSPADM

## 2025-03-31 RX ADMIN — FENTANYL CITRATE 100 MCG: 50 INJECTION, SOLUTION INTRAMUSCULAR; INTRAVENOUS at 07:24

## 2025-03-31 RX ADMIN — Medication 100 MCG: at 07:54

## 2025-03-31 RX ADMIN — CEFAZOLIN SODIUM 2 G: 2 SOLUTION INTRAVENOUS at 07:19

## 2025-03-31 RX ADMIN — Medication 100 MCG: at 07:48

## 2025-03-31 RX ADMIN — LIDOCAINE HYDROCHLORIDE 40 MG: 20 INJECTION, SOLUTION INFILTRATION; PERINEURAL at 07:28

## 2025-03-31 RX ADMIN — PROPOFOL 150 MCG/KG/MIN: 10 INJECTION, EMULSION INTRAVENOUS at 07:28

## 2025-03-31 RX ADMIN — PROPOFOL 150 MG: 10 INJECTION, EMULSION INTRAVENOUS at 07:28

## 2025-03-31 RX ADMIN — ACETAMINOPHEN 975 MG: 325 TABLET ORAL at 06:26

## 2025-03-31 RX ADMIN — PROPOFOL 50 MG: 10 INJECTION, EMULSION INTRAVENOUS at 07:31

## 2025-03-31 RX ADMIN — SODIUM CHLORIDE, SODIUM LACTATE, POTASSIUM CHLORIDE, CALCIUM CHLORIDE: 600; 310; 30; 20 INJECTION, SOLUTION INTRAVENOUS at 06:48

## 2025-03-31 RX ADMIN — ONDANSETRON 4 MG: 2 INJECTION INTRAMUSCULAR; INTRAVENOUS at 07:35

## 2025-03-31 RX ADMIN — Medication 50 MG: at 07:29

## 2025-03-31 RX ADMIN — DEXAMETHASONE SODIUM PHOSPHATE 4 MG: 4 INJECTION, SOLUTION INTRA-ARTICULAR; INTRALESIONAL; INTRAMUSCULAR; INTRAVENOUS; SOFT TISSUE at 07:35

## 2025-03-31 ASSESSMENT — ENCOUNTER SYMPTOMS: ORTHOPNEA: 0

## 2025-03-31 NOTE — ANESTHESIA POSTPROCEDURE EVALUATION
Patient: Saritha Pearson    Procedure: Procedure(s):  RIGHT Lumpectomy       Anesthesia Type:  General    Note:  Disposition: Outpatient   Postop Pain Control: Uneventful            Sign Out: Well controlled pain   PONV: No   Neuro/Psych: Uneventful            Sign Out: Acceptable/Baseline neuro status   Airway/Respiratory: Uneventful            Sign Out: Acceptable/Baseline resp. status   CV/Hemodynamics: Uneventful            Sign Out: Acceptable CV status; No obvious hypovolemia; No obvious fluid overload   Other NRE:    DID A NON-ROUTINE EVENT OCCUR?     Event details/Postop Comments:  Doing well. Alert, oriented. Mild sore throat. No nausea, or problems with pain control.     Discussed with Saritha and her friend (responsible adult) Mel the following: Patient had vomiting after extubation, with solid pieces of what appeared to be old food. She did not appear to aspirate, and airway was immediately suction and patient was turned to the side. She was opening eyes to command prior to extubation. There were no gastric contents seen during intubation. Lungs are clear to auscultation throughout. She denies any respiratory symptoms. SpO2 mid to high 90s on room air in both Phase 1 and Phase 2 recovery. Incentive spirometer teaching and use. Counseled to monitor for any signs/symptoms of aspiration/pneumonia, and to seek immediate medical attention if this arises. Mel is very diligient and comfortable with these instructions. We also discussed the rash on her periorbital skin, from the tegaderm eye tape (Saritha shared that she uses retinol product, which may have made it sensitive). It already is looking better than immediately postop (redness, no skin breakdown/swelling). Recommended avoiding retinol product for now, and applying vaseline/aquaphor, and seek medical attention if it worsens. All of the above was also discussed with Dr. Segovia.           Last vitals:  Vitals Value Taken Time   /82 03/31/25  0830   Temp 36.1  C (96.98  F) 03/31/25 0835   Pulse 94 03/31/25 0834   Resp 18 03/31/25 0834   SpO2 95 % 03/31/25 0835   Vitals shown include unfiled device data.    Electronically Signed By: Yulissa Weeks MD  March 31, 2025  10:11 AM

## 2025-03-31 NOTE — DISCHARGE INSTRUCTIONS
[facility]  Home Care Following Anesthesia  For 24 hours after surgery:  Get plenty of rest.  A responsible adult must stay with you for at least 24 hours after you leave the surgery center.  Do not drive or use heavy equipment.  If you have weakness or tingling, don't drive or use heavy equipment until this feeling goes away.   Do not drink alcohol.   Avoid strenuous or risky activities.  Ask for help when climbing stairs.  You may feel lightheaded.  IF so, sit for a few minutes before standing.  Have someone help you get up.   If you have nausea (feel sick to your stomach): Drink only clear liquids such as apple juice, ginger ale, broth or 7-Up.  Rest may also help.  Be sure to drink enough fluids.  Move to a regular diet as you feel able.   You may have a slight fever.  Call the doctor if your fever is over 100 F (37.7 C) (taken under the tongue) or lasts longer than 24 hours.  You may have a dry mouth, a sore throat, muscle aches or trouble sleeping. These should go away after 24 hours.  Do not make important or legal decisions.   It is recommended to avoid smoking.               Tips for taking pain medications  To get the best pain relief possible, remember these points:  Take pain medications as directed, before pain becomes severe.  Pain medication can upset your stomach: taking it with food may help.  Constipation is a common side effect of pain medication. Drink plenty of  fluids.  Eat foods high in fiber. Take a stool softener if recommended by your doctor or pharmacist.  Do not drink alcohol, drive or operate machinery while taking pain medications.  Ask about other ways to control pain, such as with heat, ice or relaxation.    Tylenol/Acetaminophen Consumption    If you feel your pain relief is insufficient, you may take Tylenol/Acetaminophen in addition to your narcotic pain medication.   Be careful not to exceed 4,000 mg of Tylenol/Acetaminophen in a 24 hour  period from all sources.  If you are taking extra strength Tylenol/acetaminophen (500 mg), the maximum dose is 8 tablets in 24 hours.  If you are taking regular strength acetaminophen (325 mg), the maximum dose is 12 tablets in 24 hours.    Call a doctor for any of the following:  Signs of infection (fever, growing tenderness at the surgery site, a large amount of drainage or bleeding, severe pain, foul-smelling drainage, redness, swelling).  It has been over 8 to 10 hours since surgery and you are still not able to urinate (pass water).  Headache for over 24 hours.  Numbness, tingling or weakness the day after surgery (if you had spinal anesthesia).  Signs of Covid-19 infection (temperature over 100 degrees, shortness of breath, cough, loss of taste/smell, generalized body aches, persistent headache, chills, sore throat, nausea/vomiting/diarrhea)    Your doctor is:  Dr[facility] Janis [facility], [facility]: [phone]                    After hours and weekends call the hospital @ [phone] and ask for the resident on call for:  [facility]  For emergency care, call the:  [facility]:  [phone] (TTY for hearing impaired: [phone])

## 2025-03-31 NOTE — ANESTHESIA CARE TRANSFER NOTE
Patient: Saritha Pearson    Procedure: Procedure(s):  RIGHT Lumpectomy       Diagnosis: Lipoma of breast [D17.1]  Diagnosis Additional Information: No value filed.    Anesthesia Type:   General     Note:    Oropharynx: oropharynx clear of all foreign objects and spontaneously breathing  Level of Consciousness: awake and drowsy  Oxygen Supplementation: face mask  Level of Supplemental Oxygen (L/min / FiO2): 6  Independent Airway: airway patency satisfactory and stable  Dentition: dentition unchanged  Vital Signs Stable: post-procedure vital signs reviewed and stable  Report to RN Given: handoff report given  Patient transferred to: PACU  Comments: Pt vomited on extubation. Pt suctioned well. SaO2 maintained at 95%.   Handoff Report: Identifed the Patient, Identified the Reponsible Provider, Reviewed the pertinent medical history, Discussed the surgical course, Reviewed Intra-OP anesthesia mangement and issues during anesthesia, Set expectations for post-procedure period and Allowed opportunity for questions and acknowledgement of understanding    See post op vitals  Vitals:  Vitals Value Taken Time   BP     Temp     Pulse     Resp     SpO2         Electronically Signed By: ALEX Shoemaker CRNA  March 31, 2025  8:19 AM

## 2025-03-31 NOTE — ANESTHESIA PREPROCEDURE EVALUATION
Anesthesia Pre-Procedure Evaluation    Patient: Saritha Pearson   MRN: [id number] : 1965        Procedure : Procedure(s):  RIGHT Lumpectomy          Past Medical History:   Diagnosis Date    Asthma     asthma sx after resp infections    Constipation     Gastro-oesophageal reflux disease     PONV (postoperative nausea and vomiting)     Vaginal high risk HPV DNA test positive , ,     see problem list      Past Surgical History:   Procedure Laterality Date    ABDOMEN SURGERY      abdominal wall mass    ABDOMINOPLASTY      pt has post op infection    COSMETIC SURGERY      EXCISE LESION TRUNK  10/4/2011    Procedure:EXCISE LESION TRUNK; Excision Abdominal Wall Mass ; Surgeon:CARLITO BETTS; Location:[facility]    GYN SURGERY      HYSTERECTOMY      HYSTERECTOMY, PAP NO LONGER INDICATED  2018      Allergies   Allergen Reactions    Sulfatolamide Itching    Sulfa Antibiotics Hives, Itching and Rash      Social History     Tobacco Use    Smoking status: Never     Passive exposure: Never    Smokeless tobacco: Never   Substance Use Topics    Alcohol use: Yes     Comment: occasionally      Wt Readings from Last 1 Encounters:   25 95.1 kg (209 lb 9.6 oz)        Anesthesia Evaluation   Pt has had prior anesthetic. Type: General.    History of anesthetic complications   Needs fan in recovery (feels anxious when warm).    ROS/MED HX  ENT/Pulmonary:     (+)                     Intermittent, asthma Last exacerbation: Weeks ago,  Treatment: Inhaler prn,              (-) recent URI   Neurologic:       Cardiovascular:    (-) CRESPO, orthopnea/PND and syncope   METS/Exercise Tolerance: >4 METS    Hematologic:       Musculoskeletal:  - neg musculoskeletal ROS     GI/Hepatic:     (+) GERD, Asymptomatic on medication,                  Renal/Genitourinary:       Endo: Comment: Patient is on Semaglutide weekly    (+)               Obesity,       Psychiatric/Substance Use: Comment: Narcolepsy    (+)      "Recreational drug usage: Cannabis.    Infectious Disease:       Malignancy: Comment: Breast mass      Other:            Physical Exam    Airway        Mallampati: II   TM distance: > 3 FB   Neck ROM: full   Mouth opening: > 3 cm    Respiratory Devices and Support         Dental     Comment: Most teeth are veneers      B=Bridge, C=Chipped, L=Loose, M=Missing    Cardiovascular          Rhythm and rate: regular and normal     Pulmonary           breath sounds clear to auscultation           OUTSIDE LABS:  CBC:   Lab Results   Component Value Date    WBC 6.2 03/27/2025    WBC 6.4 03/28/2022    HGB 12.5 03/27/2025    HGB 12.8 03/28/2022    HCT 39.9 03/27/2025    HCT 40.3 03/28/2022     03/27/2025     03/28/2022     BMP:   Lab Results   Component Value Date     03/27/2025     10/17/2024    POTASSIUM 4.2 03/27/2025    POTASSIUM 4.2 10/17/2024    CHLORIDE 107 03/27/2025    CHLORIDE 107 10/17/2024    CO2 23 03/27/2025    CO2 24 10/17/2024    BUN 9.3 03/27/2025    BUN 11.1 10/17/2024    CR 0.77 03/27/2025    CR 0.76 10/17/2024     (H) 03/27/2025    GLC 93 10/17/2024     COAGS:   Lab Results   Component Value Date    INR 0.9 09/11/2023     POC: No results found for: \"BGM\", \"HCG\", \"HCGS\"  HEPATIC:   Lab Results   Component Value Date    ALBUMIN 4.3 10/17/2024    PROTTOTAL 7.9 04/22/2018    ALT 23 04/22/2018    AST 19 04/22/2018    ALKPHOS 68 04/22/2018    BILITOTAL 0.2 04/22/2018     OTHER:   Lab Results   Component Value Date    A1C 5.8 (H) 10/17/2024    BENJAMIN 10.5 (H) 03/27/2025    PHOS 3.6 10/17/2024    LIPASE 136 04/22/2018    SED 12 04/28/2016       Anesthesia Plan    ASA Status:  3    NPO Status:  ELEVATED Aspiration Risk/Unknown    Anesthesia Type: General.     - Airway: ETT              Consents    Anesthesia Plan(s) and associated risks, benefits, and realistic alternatives discussed. Questions answered and patient/representative(s) expressed understanding.     - Discussed:     - " "Discussed with:  Patient            Postoperative Care    Pain management: IV analgesics, Oral pain medications, Multi-modal analgesia.   PONV prophylaxis: Ondansetron (or other 5HT-3), Dexamethasone or Solumedrol, Background Propofol Infusion     Comments:    Other Comments: Famotidine ordered for preop (patient unsure if feeling some heartburn) - on recheck, patient received famotidine and is asymptomatic. Will still plan for ETT out of caution.  Discussed risks of general anesthesia, including aspiration pneumonia, sore throat/hoarse voice, abrasions/damage to lips/tongue/teeth, nausea, rare complications (including medication reactions, cardiac, pulmonary, hypoxia/low oxygen, recall). Ensured understanding, invited questions and all questions were answered. Patient wishes to proceed.               Minerva Williamson MD    Clinically Significant Risk Factors Present on Admission                             # Obesity: Estimated body mass index is 33.83 kg/m  as calculated from the following:    Height as of 3/27/25: 1.676 m (5' 6\").    Weight as of 3/27/25: 95.1 kg (209 lb 9.6 oz).                "

## 2025-03-31 NOTE — ANESTHESIA PROCEDURE NOTES
Airway       Patient location during procedure: OR       Procedure Start/Stop Times: 3/31/2025 7:32 AM  Staff -        Performed By: CRNAIndications and Patient Condition       Indications for airway management: yasmin-procedural       Induction type:intravenous       Mask difficulty assessment: 2 - vent by mask + OA or adjuvant +/- NMBA    Final Airway Details       Final airway type: endotracheal airway       Successful airway: ETT - single and Oral  Endotracheal Airway Details        ETT size (mm): 7.0       Cuffed: yes       Successful intubation technique: video laryngoscopy (Unable to view anatomy past epiglottis)       VL Blade Size: Glidescope 3       Grade View of Cords: 1       Adjucts: stylet       Position: Left       Measured from: gums/teeth       Secured at (cm): 20       Bite block used: None    Post intubation assessment        Placement verified by: capnometry        Number of attempts at approach: 2       Number of other approaches attempted: 1 (Glidescope after poor view with DL)       Secured with: tape       Ease of procedure: easy       Dentition: Intact and Unchanged    Medication(s) Administered   Medication Administration Time: 3/31/2025 7:32 AM

## 2025-03-31 NOTE — OP NOTE
PATIENT NAME:  Saritha Pearson  PATIENT YOB: 1965    DATE OF SURGERY: March 31, 2025     SURGEON: Janis Segovia MD  ASSISTANT(S): Beti Maldonado MD PGY-3    PREOPERATIVE DIAGNOSIS: Lipoma of the right breast, 12:00  POSTOPERATIVE DIAGNOSIS: Lipoma over the right clavicular region    PROCEDURE(S): Excision of right clavicular mass    ANESTHESIA: General    CLINICAL NOTE:  Saritha Pearson is a 59 year old woman with a palpable mass high up in the right breast. Imaging was consistent with a lipoma measuring 2.4 cm.  The risks and benefits of a lumpectomy or excision of the mass were discussed with the patient and she elected to proceed with informed consent.    OPERATIVE FINDINGS:  1. Subcutaneous lipomatous mass directly overlying the right clavicle excised.    OPERATIVE PROCEDURE:  The palpable mass was confirmed with the patient and marked on the skin in the preoperative holding area.  The patient was brought to the operating room and placed in the supine position with appropriate padding for all of the pressure points.  A general anesthetic was administered by the Anesthesia team.  A surgical safety checklist was performed to confirm the patient's identity, the site and laterality of the procedure. Perioperative antibiotics (Ancef) was provided.  VTE prophylaxis was provided with serial compression devices. The right  breast was then prepped and draped in the usual sterile fashion using Chloraprep.     The mass was directly overlying the right clavicle. An incision was made directly overlying the palpable mass.  The palpable lipomatous mass was dissected out.  The specimen was inked and then sent to pathology. It measured 3.0 cm x 1.0 cm. The cavity measured 3.5 cm x 2.8 cm and was 1.5 cm deep.  The wound was irrigated and hemostasis was achieved. 13 mL of 0.25% marcaine with epinephrine was infiltrated into the surgical site.  The incision was closed in two layers with interrupted 3-0 vicryl and a  running subcuticular 4-0 monocryl.  Steristrips and Primipore dressings were applied.   The patient tolerated the procedure well. The sponge, needle, instrument counts were correct.  The patient was then awakened and taken to recovery in stable fashion.     I was present and scrubbed for the entire above procedure.    Dr. Maldonado actively first assisted during this operation providing necessary retraction and exposure as well as maintaining hemostasis and clear operative field.  This was helpful in allowing the operation to proceed in a safe and expeditious manner.  They were present for the entire duration of the critical portions of the operation.    EBL: Nil    SPECIMEN(S):  A. Right clavicular mass    POSTOPERATIVE PLANS:  Saritha Pearson will be discharged home today with wound care instructions and no prescription for analgesics.  She will follow-up with me in 2 weeks.     Janis Segovia MD MSc [facility] FACS  Associate Professor of Surgery  Division of Surgical Oncology  [facility]

## 2025-04-02 ENCOUNTER — MYC MEDICAL ADVICE (OUTPATIENT)
Dept: ALLERGY | Facility: CLINIC | Age: 60
End: 2025-04-02
Payer: COMMERCIAL

## 2025-04-02 DIAGNOSIS — J45.20 MILD INTERMITTENT ASTHMA WITHOUT COMPLICATION: Primary | ICD-10-CM

## 2025-04-02 DIAGNOSIS — R05.9 COUGH: ICD-10-CM

## 2025-04-03 RX ORDER — BENZONATATE 200 MG/1
200 CAPSULE ORAL 3 TIMES DAILY PRN
Qty: 30 CAPSULE | Refills: 1 | Status: SHIPPED | OUTPATIENT
Start: 2025-04-03

## 2025-04-09 LAB
PATH REPORT.COMMENTS IMP SPEC: NORMAL
PATH REPORT.COMMENTS IMP SPEC: NORMAL
PATH REPORT.FINAL DX SPEC: NORMAL
PATH REPORT.GROSS SPEC: NORMAL
PATH REPORT.MICROSCOPIC SPEC OTHER STN: NORMAL
PATH REPORT.RELEVANT HX SPEC: NORMAL
PATHOLOGY SYNOPTIC REPORT: NORMAL
PHOTO IMAGE: NORMAL

## 2025-04-12 DIAGNOSIS — Z83.3 FHX: TYPE 2 DIABETES MELLITUS: ICD-10-CM

## 2025-04-12 DIAGNOSIS — E66.811 CLASS 1 OBESITY IN ADULT, UNSPECIFIED BMI, UNSPECIFIED OBESITY TYPE, UNSPECIFIED WHETHER SERIOUS COMORBIDITY PRESENT: ICD-10-CM

## 2025-04-12 DIAGNOSIS — R73.03 PREDIABETES: ICD-10-CM

## 2025-04-13 RX ORDER — SEMAGLUTIDE 1.7 MG/.75ML
INJECTION, SOLUTION SUBCUTANEOUS
Qty: 3 ML | Refills: 1 | Status: SHIPPED | OUTPATIENT
Start: 2025-04-13

## 2025-04-15 ENCOUNTER — E-VISIT (OUTPATIENT)
Dept: URGENT CARE | Facility: CLINIC | Age: 60
End: 2025-04-15
Payer: COMMERCIAL

## 2025-04-15 DIAGNOSIS — J01.90 ACUTE SINUSITIS WITH SYMPTOMS > 10 DAYS: Primary | ICD-10-CM

## 2025-04-15 NOTE — PROGRESS NOTES
[address] [address]  Phone: [phone]    PATIENT NAME:  Saritha Pearson  PATIENT YOB: 1965    FOLLOW-UP  Apr 16, 2025    Saritha Pearson is a 59 year old female who returns for her 1st post-operative follow-up visit.    HPI:    She underwent excision of a right clavicular mass on 3/31/2025.  She is currently 2 week(s) post-op.  Final surgical pathology showed a lipoma.    Since the procedure, she has been doing well. She denies any redness or swelling, or drainage from the incision, or fever/chills.  She has good range of motion and denies any upper extremity swelling.     There were no vitals taken for this visit.   Physical Exam  Constitutional:       Appearance: She is well-developed.   Pulmonary:      Effort: No respiratory distress.   Chest:       Skin:     General: Skin is warm and dry.         INVESTIGATIONS:    Surgical Pathology (3/31/2025):  Final Diagnosis   Soft tissue, right clavicular mass, excision:  - Lipoma  - Negative for malignancy     ASSESSMENT:    Saritha Pearson is a 59 year old female with a lipoma near the right clavicle on the chest wall.    She is doing well.  I recommended she start moisturizing and massaging the scar with Vaseline.     We reviewed the pathology today and a copy of the report was provided.  No other treatment or specific follow up is indicated. Lipomas are completely benign. I have not made specific follow-up appointments with me at this time and will defer ongoing care to her PCP.  She knows to call if she has concerns.     All of the above was discussed with the patient and all questions were answered.     PLAN:  Follow up with PCP    Janis Segovia MD MS [facility] FACS  Associate Professor of Surgery  Division of Surgical Oncology  [facility]

## 2025-04-15 NOTE — PATIENT INSTRUCTIONS
Dear Saritha Pearson    After reviewing your responses, I've been able to diagnose you with Acute sinusitis with symptoms > 10 days.      Based on your responses and diagnosis, I have prescribed   Orders Placed This Encounter   Medications     amoxicillin-clavulanate (AUGMENTIN) 875-125 MG tablet     Sig: Take 1 tablet by mouth 2 times daily for 7 days.     Dispense:  14 tablet     Refill:  0      to treat your symptoms. I have sent this to your pharmacy.?     It is also important to stay well hydrated, get lots of rest and take over-the-counter decongestants,?tylenol?or ibuprofen if you?are able to?take those medications per your primary care provider to help relieve discomfort.?     It is important that you take?all of?your prescribed medication even if your symptoms are improving after a few doses.? Taking?all of?your medicine helps prevent the symptoms from returning.?     If your symptoms worsen, you develop severe headache, vomiting, high fever (>102), or are not improving in 7 days, please contact your primary care provider for an appointment or visit any of our convenient Walk-in Care or Urgent Care Centers to be seen which can be found on our website?here.?     Thanks again for choosing?us?as your health care partner,?   ?  ALEX Portillo CNP?

## 2025-04-16 ENCOUNTER — OFFICE VISIT (OUTPATIENT)
Dept: SURGERY | Facility: CLINIC | Age: 60
End: 2025-04-16
Payer: COMMERCIAL

## 2025-04-16 DIAGNOSIS — D17.1 LIPOMA OF BREAST: Primary | ICD-10-CM

## 2025-04-16 NOTE — LETTER
4/16/2025      Saritha Pearson  [address]  [place] [facility] [address]      Dear Colleague,    Thank you for referring your patient, Saritha Pearson, to the [facility]. Please see a copy of my visit note below.      [facility]  [address]  [facility] [address]  Phone: [phone]    PATIENT NAME:  Saritha Pearson  PATIENT YOB: 1965    FOLLOW-UP  Apr 16, 2025    Saritha Pearson is a 59 year old female who returns for her 1st post-operative follow-up visit.    HPI:    She underwent excision of a right clavicular mass on 3/31/2025.  She is currently 2 week(s) post-op.  Final surgical pathology showed a lipoma.    Since the procedure, she has been doing well. She denies any redness or swelling, or drainage from the incision, or fever/chills.  She has good range of motion and denies any upper extremity swelling.     There were no vitals taken for this visit.   Physical Exam  Constitutional:       Appearance: She is well-developed.   Pulmonary:      Effort: No respiratory distress.   Chest:       Skin:     General: Skin is warm and dry.         INVESTIGATIONS:    Surgical Pathology (3/31/2025):  Final Diagnosis   Soft tissue, right clavicular mass, excision:  - Lipoma  - Negative for malignancy     ASSESSMENT:    Saritha Pearson is a 59 year old female with a lipoma near the right clavicle on the chest wall.    She is doing well.  I recommended she start moisturizing and massaging the scar with Vaseline.     We reviewed the pathology today and a copy of the report was provided.  No other treatment or specific follow up is indicated. Lipomas are completely benign. I have not made specific follow-up appointments with me at this time and will defer ongoing care to her PCP.  She knows to call if she has concerns.     All of the above was discussed with the patient and all questions were answered.     PLAN:  Follow up with PCP    Janis Segovia MD MS [facility]  FACS  Associate Professor of Surgery  Division of Surgical Oncology  [facility]       Again, thank you for allowing me to participate in the care of your patient.        Sincerely,        Janis Segovia MD    Electronically signed

## 2025-04-16 NOTE — NURSING NOTE
Saritha Pearson's goals for this visit include:   Chief Complaint   Patient presents with    Surgical Followup     2 week post op right breast lumpectomy       She requests these members of her care team be copied on today's visit information: no    PCP: Aristides Rogers    Referring Provider:  Referred Self, MD  No address on file    There were no vitals taken for this visit.    Do you need any medication refills at today's visit? No    Rachna Lindsey LPN

## 2025-04-27 ENCOUNTER — NURSE TRIAGE (OUTPATIENT)
Dept: FAMILY MEDICINE | Facility: CLINIC | Age: 60
End: 2025-04-27
Payer: COMMERCIAL

## 2025-04-28 ENCOUNTER — OFFICE VISIT (OUTPATIENT)
Dept: PEDIATRICS | Facility: CLINIC | Age: 60
End: 2025-04-28
Payer: COMMERCIAL

## 2025-04-28 VITALS
DIASTOLIC BLOOD PRESSURE: 84 MMHG | RESPIRATION RATE: 16 BRPM | WEIGHT: 208.7 LBS | BODY MASS INDEX: 33.54 KG/M2 | SYSTOLIC BLOOD PRESSURE: 124 MMHG | HEART RATE: 82 BPM | OXYGEN SATURATION: 97 % | TEMPERATURE: 97.7 F | HEIGHT: 66 IN

## 2025-04-28 DIAGNOSIS — E83.52 HYPERCALCEMIA: ICD-10-CM

## 2025-04-28 DIAGNOSIS — M79.662 PAIN OF LEFT LOWER LEG: Primary | ICD-10-CM

## 2025-04-28 LAB
ANION GAP SERPL CALCULATED.3IONS-SCNC: 12 MMOL/L (ref 3–14)
BUN SERPL-MCNC: 8 MG/DL (ref 7–30)
CA-I BLD-MCNC: 5.3 MG/DL (ref 4.4–5.2)
CALCIUM SERPL-MCNC: 11 MG/DL (ref 8.5–10.1)
CHLORIDE BLD-SCNC: 106 MMOL/L (ref 94–109)
CK SERPL-CCNC: 152 U/L (ref 26–192)
CO2 SERPL-SCNC: 26 MMOL/L (ref 20–32)
CREAT SERPL-MCNC: 0.9 MG/DL (ref 0.52–1.04)
D DIMER PPP FEU-MCNC: <0.27 UG/ML FEU (ref 0–0.5)
EGFRCR SERPLBLD CKD-EPI 2021: 74 ML/MIN/1.73M2
GLUCOSE BLD-MCNC: 99 MG/DL (ref 70–99)
POTASSIUM BLD-SCNC: 4.4 MMOL/L (ref 3.4–5.3)
SODIUM SERPL-SCNC: 144 MMOL/L (ref 135–145)

## 2025-04-28 PROCEDURE — 3079F DIAST BP 80-89 MM HG: CPT | Performed by: INTERNAL MEDICINE

## 2025-04-28 PROCEDURE — 99214 OFFICE O/P EST MOD 30 MIN: CPT | Mod: 24 | Performed by: INTERNAL MEDICINE

## 2025-04-28 PROCEDURE — 82550 ASSAY OF CK (CPK): CPT | Performed by: INTERNAL MEDICINE

## 2025-04-28 PROCEDURE — 85379 FIBRIN DEGRADATION QUANT: CPT | Performed by: INTERNAL MEDICINE

## 2025-04-28 PROCEDURE — 36415 COLL VENOUS BLD VENIPUNCTURE: CPT | Performed by: INTERNAL MEDICINE

## 2025-04-28 PROCEDURE — 82330 ASSAY OF CALCIUM: CPT | Performed by: INTERNAL MEDICINE

## 2025-04-28 PROCEDURE — 80048 BASIC METABOLIC PNL TOTAL CA: CPT | Performed by: INTERNAL MEDICINE

## 2025-04-28 PROCEDURE — 3074F SYST BP LT 130 MM HG: CPT | Performed by: INTERNAL MEDICINE

## 2025-04-28 NOTE — Clinical Note
Labs today for muscle pain show progressive hypercalcemia. Ionized calcium pending but I have asked her to follow-up with you for work up.

## 2025-04-28 NOTE — PROGRESS NOTES
"Acute and Diagnostic Services Clinic Visit    Assessment & Plan     Pain of left lower leg  No evidence of injury or infection on exam. Ddimer done to rule out DVT and was negative. CK normal ruling out rhabdo. It is possible that she has muscle pain after acupuncture treatment but no signs of more serious injury. Potentially related to hypercalcemia as below    - Basic metabolic panel; Future  - D dimer quantitative; Future  - Basic metabolic panel  - D dimer quantitative    Hypercalcemia  Noted on past labs. Repeated today with Ica which is also elevated. Could explain muscle pain. With Ca <14 no need for acute treatment. Will follow up for additional evaluation with her PCP.    - CK total; Future  - Ionized Calcium; Future  - CK total  - Ionized Calcium          BMI  Estimated body mass index is 33.69 kg/m  as calculated from the following:    Height as of this encounter: 1.676 m (5' 6\").    Weight as of this encounter: 94.7 kg (208 lb 11.2 oz).             Giovanna Melara is a 59 year old, presenting for the following health issues:  No chief complaint on file.    Last acupuncture was about 2.5 weeks ago. In the most recent two sessions (usually MWF)  they needled into her left calf when in the past they had only applied needles to the knee. That same day she developed pain in the left calf. Pain is on the lateral aspect of hte left calf.     The pain seemed to worsen for about three days and then has stayed the same since then.     She has tried using tylenol, doing epsom salt baths and doing massage. She did notice a scab on the left leg but has not been picking at it. She has had mild swelling in the left leg.     HPI      Evaluation for possible DVT  Onset/Duration: 2-3 weeks  Description:       Location: Left leg       Redness: YES       Pain: mild, moderate       Warmth: YES       Joint swelling no   Progression of symptoms same  Accompanying signs and symptoms:       Fevers: YES       " "Numbness/tingling/weakness: no        Chest pain/pleurisy: no        Shortness of breath: no   History        Trauma: no         Recent travel/when: no         Previous history of DVT: no         Family history of DVT: no         Recent surgery: no   Aggravating factors include: none  Therapies tried and outcome: massage  Prior surgery on arteries of veins in this area: No              Objective    /84 (BP Location: Right arm, Patient Position: Sitting, Cuff Size: Adult Regular)   Pulse 82   Temp 97.7  F (36.5  C)   Resp 16   Ht 1.676 m (5' 6\")   Wt 94.7 kg (208 lb 11.2 oz)   SpO2 97%   BMI 33.69 kg/m    There is no height or weight on file to calculate BMI.  Physical Exam       Well appearing  No neck mass or goiter  RRR  CTAB  Leg without notable edema or other deformity. No skin changes. TTP diffusely over left calf.            Signed Electronically by: Oriana Lanier MD    "

## 2025-04-28 NOTE — TELEPHONE ENCOUNTER
"Nurse Triage SBAR    Is this a 2nd Level Triage? NO    Situation: possible infected L leg from acupuncture    Background: Has been getting acupuncture on her knee but 2 weeks ago at her last appointment, the acupuncturist put needles in her left leg and now she feels the leg is infected    Assessment: sounds like leg has an infection so needs to be assessed in clinic. Feels like she may have a fever but has not checked. Feels \"warm and achy\". Denies chills, but feels cold. Denies vomiting, nausea, or severe pain. Redness around wound and some pain    Protocol Recommended Disposition:   Go To ED/UCC Now (Or To Office With PCP Approval)    Recommendation: contacted [facility] provider and spoke with Dr Oriana Lanier.They accepted her and contact her.     Does the patient meet one of the following criteria for ADS visit consideration? 16+ years old, with an [facility] PCP     TIP  Providers, please consider if this condition is appropriate for management at one of our Acute and Diagnostic Services sites.     If patient is a good candidate, please use dotphrase <dot>triageresponse and select Refer to ADS to document.      Reason for Disposition   Looks infected (spreading redness, red streak, pus) and fever    Additional Information   Negative: Stitches and not infected   Negative: Surgical wound infection suspected (post-op)   Negative: Bright red, wide-spread, sunburn-like rash   Negative: Black (necrotic) or blisters develop in wound    Answer Assessment - Initial Assessment Questions  1. LOCATION: \"Where is the wound located?\"       Left leg  2. WOUND APPEARANCE: \"What does the wound look like?\"       Swollen, painful, some redness  3. SIZE: If redness is present, ask: \"What is the size of the red area?\" (Inches, centimeters, or compare to size of a coin)       Patient cannot describe  4. SPREAD: \"What's changed in the last day?\"  \"Do you see any red streaks coming from the wound?\"      Redness around it  5. ONSET: " "\"When did it start to look infected?\"       About 1 week ago. Last had acupuncture in the leg was 2 weeks ago  6. MECHANISM: \"How did the wound start, what was the cause?\"      Had acupuncture in her leg 2 weeks ago  7. PAIN: \"Is there any pain?\" If Yes, ask: \"How bad is the pain?\"   (Scale 1-10; or mild, moderate, severe)      Yes, pain mild  8. FEVER: \"Do you have a fever?\" If Yes, ask: \"What is your temperature, how was it measured, and when did it start?\"      Feels \"warm\", has not checked her temperature  9. OTHER SYMPTOMS: \"Do you have any other symptoms?\" (e.g., shaking chills, weakness, rash elsewhere on body)      Feels cold and achy  10. PREGNANCY: \"Is there any chance you are pregnant?\" \"When was your last menstrual period?\"        no    Protocols used: Wound Infection-A-[place]      Geraldine PARIKHN, RN    "

## 2025-04-28 NOTE — PATIENT INSTRUCTIONS
Your labs today show an elevated calcium. I think that this may be the reason for your muscle pain though am still waiting for the rest of your lab results to come back.     Please schedule an appointment with your primary care doctor to talk about the elevation in calcium and next steps for evaluating this.

## 2025-05-14 ENCOUNTER — E-VISIT (OUTPATIENT)
Dept: URGENT CARE | Facility: CLINIC | Age: 60
End: 2025-05-14
Payer: COMMERCIAL

## 2025-05-14 DIAGNOSIS — J01.90 ACUTE SINUSITIS WITH SYMPTOMS > 10 DAYS: Primary | ICD-10-CM

## 2025-05-14 NOTE — PATIENT INSTRUCTIONS
Dear Saritha Pearson    After reviewing your responses, I've been able to diagnose you with Acute sinusitis with symptoms > 10 days.      Based on your responses and diagnosis, I have prescribed   Orders Placed This Encounter   Medications     amoxicillin-clavulanate (AUGMENTIN) 875-125 MG tablet     Sig: Take 1 tablet by mouth 2 times daily for 7 days.     Dispense:  14 tablet     Refill:  0    to treat your symptoms. I have sent this to your pharmacy.?     It is also important to stay well hydrated, get lots of rest and take over-the-counter decongestants,?tylenol?or ibuprofen if you?are able to?take those medications per your primary care provider to help relieve discomfort.?     It is important that you take?all of?your prescribed medication even if your symptoms are improving after a few doses.? Taking?all of?your medicine helps prevent the symptoms from returning.?     If your symptoms worsen, you develop severe headache, vomiting, high fever (>102), or are not improving in 7 days, please contact your primary care provider for an appointment or visit any of our convenient Walk-in Care or Urgent Care Centers to be seen which can be found on our website?here.?     Thanks again for choosing?us?as your health care partner,?   ?  Sherry Cisneros PA-C?   Thank you for choosing us for your care. I have placed an order for a prescription so that you can start treatment:  Orders Placed This Encounter   Medications     amoxicillin-clavulanate (AUGMENTIN) 875-125 MG tablet     Sig: Take 1 tablet by mouth 2 times daily for 7 days.     Dispense:  14 tablet     Refill:  0        View your full visit summary for details by clicking on the link below. Your pharmacist will able to address any questions you may have about the medication.     If you're not feeling better within 5-7 days, please schedule an appointment.  You can schedule an appointment right here in [facility], or call [phone]  If the visit is for the same  symptoms as your eVisit, we'll refund the cost of your eVisit if seen within seven days.

## 2025-06-10 DIAGNOSIS — R73.03 PREDIABETES: ICD-10-CM

## 2025-06-10 DIAGNOSIS — Z83.3 FHX: TYPE 2 DIABETES MELLITUS: ICD-10-CM

## 2025-06-10 DIAGNOSIS — E66.811 CLASS 1 OBESITY IN ADULT, UNSPECIFIED BMI, UNSPECIFIED OBESITY TYPE, UNSPECIFIED WHETHER SERIOUS COMORBIDITY PRESENT: ICD-10-CM

## 2025-06-11 RX ORDER — SEMAGLUTIDE 1.7 MG/.75ML
INJECTION, SOLUTION SUBCUTANEOUS
Qty: 3 ML | Refills: 1 | Status: SHIPPED | OUTPATIENT
Start: 2025-06-11

## 2025-06-15 ENCOUNTER — HEALTH MAINTENANCE LETTER (OUTPATIENT)
Age: 60
End: 2025-06-15

## 2025-08-11 DIAGNOSIS — E66.811 CLASS 1 OBESITY IN ADULT, UNSPECIFIED BMI, UNSPECIFIED OBESITY TYPE, UNSPECIFIED WHETHER SERIOUS COMORBIDITY PRESENT: ICD-10-CM

## 2025-08-11 DIAGNOSIS — Z83.3 FHX: TYPE 2 DIABETES MELLITUS: ICD-10-CM

## 2025-08-11 DIAGNOSIS — R73.03 PREDIABETES: ICD-10-CM

## 2025-08-11 RX ORDER — SEMAGLUTIDE 1.7 MG/.75ML
INJECTION, SOLUTION SUBCUTANEOUS
Qty: 3 ML | Refills: 0 | Status: SHIPPED | OUTPATIENT
Start: 2025-08-11

## (undated) DEVICE — DRSG PRIMAPORE 03 1/8X6" 66000318

## (undated) DEVICE — GLOVE BIOGEL PI MICRO INDICATOR UNDERGLOVE SZ 6.0 48960

## (undated) DEVICE — STRAP KNEE/BODY 31143004

## (undated) DEVICE — SU VICRYL+ 3-0 27IN SH UND VCP416H

## (undated) DEVICE — SU MONOCRYL 4-0 P-3 18" UND Y494G

## (undated) DEVICE — DRSG STERI STRIP 1/2X4" R1547

## (undated) DEVICE — MARKER MARGIN MARKER STD 6 COLOR SGL USE MMS6

## (undated) DEVICE — LINEN TOWEL PACK X5 5464

## (undated) DEVICE — PACK MINOR CUSTOM ASC

## (undated) DEVICE — SURGICEL ABSORBABLE HEMOSTAT SNOW 4"X4" 2083

## (undated) DEVICE — SPECIMEN CONTAINER W/10% BUFFERED FORMALIN 120ML 591201

## (undated) DEVICE — DRAPE U SPLIT 74X120" 29440

## (undated) DEVICE — ESU ELEC BLADE 2.75" COATED/INSULATED E1455

## (undated) DEVICE — ESU GROUND PAD ADULT W/CORD E7507

## (undated) DEVICE — SOL WATER IRRIG 500ML BOTTLE 2F7113

## (undated) DEVICE — PREP CHLORAPREP 26ML TINTED HI-LITE ORANGE 930815

## (undated) DEVICE — DRAPE IOBAN INCISE 23X17" 6650EZ

## (undated) DEVICE — GLOVE BIOGEL PI MICRO SZ 6.0 48560

## (undated) DEVICE — NDL 15GA 1.5" 8881200029

## (undated) DEVICE — SOL NACL 0.9% IRRIG 500ML BOTTLE 2F7123

## (undated) RX ORDER — CEFAZOLIN SODIUM 2 G/50ML
SOLUTION INTRAVENOUS
Status: DISPENSED
Start: 2025-03-31

## (undated) RX ORDER — DEXAMETHASONE SODIUM PHOSPHATE 4 MG/ML
INJECTION, SOLUTION INTRA-ARTICULAR; INTRALESIONAL; INTRAMUSCULAR; INTRAVENOUS; SOFT TISSUE
Status: DISPENSED
Start: 2025-03-31

## (undated) RX ORDER — PROPOFOL 10 MG/ML
INJECTION, EMULSION INTRAVENOUS
Status: DISPENSED
Start: 2025-03-31

## (undated) RX ORDER — BUPIVACAINE HYDROCHLORIDE 2.5 MG/ML
INJECTION, SOLUTION EPIDURAL; INFILTRATION; INTRACAUDAL; PERINEURAL
Status: DISPENSED
Start: 2025-03-31

## (undated) RX ORDER — FENTANYL CITRATE 50 UG/ML
INJECTION, SOLUTION INTRAMUSCULAR; INTRAVENOUS
Status: DISPENSED
Start: 2025-03-31

## (undated) RX ORDER — GLYCOPYRROLATE 0.2 MG/ML
INJECTION, SOLUTION INTRAMUSCULAR; INTRAVENOUS
Status: DISPENSED
Start: 2025-03-31

## (undated) RX ORDER — EPINEPHRINE 1 MG/ML
INJECTION, SOLUTION INTRAMUSCULAR; SUBCUTANEOUS
Status: DISPENSED
Start: 2025-03-31

## (undated) RX ORDER — ONDANSETRON 2 MG/ML
INJECTION INTRAMUSCULAR; INTRAVENOUS
Status: DISPENSED
Start: 2025-03-31

## (undated) RX ORDER — FENTANYL CITRATE-0.9 % NACL/PF 10 MCG/ML
PLASTIC BAG, INJECTION (ML) INTRAVENOUS
Status: DISPENSED
Start: 2025-03-31

## (undated) RX ORDER — ACETAMINOPHEN 325 MG/1
TABLET ORAL
Status: DISPENSED
Start: 2025-03-31